# Patient Record
Sex: FEMALE | Race: WHITE | NOT HISPANIC OR LATINO | Employment: OTHER | URBAN - METROPOLITAN AREA
[De-identification: names, ages, dates, MRNs, and addresses within clinical notes are randomized per-mention and may not be internally consistent; named-entity substitution may affect disease eponyms.]

---

## 2017-01-12 ENCOUNTER — COMMUNICATION - HEALTHEAST (OUTPATIENT)
Dept: FAMILY MEDICINE | Facility: CLINIC | Age: 67
End: 2017-01-12

## 2017-03-16 ENCOUNTER — COMMUNICATION - HEALTHEAST (OUTPATIENT)
Dept: FAMILY MEDICINE | Facility: CLINIC | Age: 67
End: 2017-03-16

## 2017-03-16 DIAGNOSIS — L94.0 CIRCUMSCRIBED SCLERODERMA: ICD-10-CM

## 2017-03-17 RX ORDER — CLOBETASOL PROPIONATE 0.5 MG/G
CREAM TOPICAL
Qty: 15 G | Refills: 1 | Status: SHIPPED | OUTPATIENT
Start: 2017-03-17 | End: 2022-08-02

## 2017-03-20 ENCOUNTER — COMMUNICATION - HEALTHEAST (OUTPATIENT)
Dept: FAMILY MEDICINE | Facility: CLINIC | Age: 67
End: 2017-03-20

## 2017-04-11 ENCOUNTER — COMMUNICATION - HEALTHEAST (OUTPATIENT)
Dept: FAMILY MEDICINE | Facility: CLINIC | Age: 67
End: 2017-04-11

## 2017-04-18 ENCOUNTER — COMMUNICATION - HEALTHEAST (OUTPATIENT)
Dept: FAMILY MEDICINE | Facility: CLINIC | Age: 67
End: 2017-04-18

## 2017-05-02 ENCOUNTER — COMMUNICATION - HEALTHEAST (OUTPATIENT)
Dept: FAMILY MEDICINE | Facility: CLINIC | Age: 67
End: 2017-05-02

## 2017-05-02 DIAGNOSIS — B07.9 WART: ICD-10-CM

## 2017-05-11 ENCOUNTER — COMMUNICATION - HEALTHEAST (OUTPATIENT)
Dept: FAMILY MEDICINE | Facility: CLINIC | Age: 67
End: 2017-05-11

## 2017-05-16 ENCOUNTER — AMBULATORY - HEALTHEAST (OUTPATIENT)
Dept: LAB | Facility: CLINIC | Age: 67
End: 2017-05-16

## 2017-05-16 ENCOUNTER — COMMUNICATION - HEALTHEAST (OUTPATIENT)
Dept: SCHEDULING | Facility: CLINIC | Age: 67
End: 2017-05-16

## 2017-05-16 DIAGNOSIS — R30.0 DYSURIA: ICD-10-CM

## 2017-05-17 ENCOUNTER — RECORDS - HEALTHEAST (OUTPATIENT)
Dept: ADMINISTRATIVE | Facility: OTHER | Age: 67
End: 2017-05-17

## 2017-05-18 ENCOUNTER — OFFICE VISIT - HEALTHEAST (OUTPATIENT)
Dept: FAMILY MEDICINE | Facility: CLINIC | Age: 67
End: 2017-05-18

## 2017-05-18 DIAGNOSIS — R30.0 DYSURIA: ICD-10-CM

## 2017-05-25 ENCOUNTER — RECORDS - HEALTHEAST (OUTPATIENT)
Dept: ADMINISTRATIVE | Facility: OTHER | Age: 67
End: 2017-05-25

## 2017-06-30 ENCOUNTER — AMBULATORY - HEALTHEAST (OUTPATIENT)
Dept: LAB | Facility: CLINIC | Age: 67
End: 2017-06-30

## 2017-06-30 ENCOUNTER — AMBULATORY - HEALTHEAST (OUTPATIENT)
Dept: FAMILY MEDICINE | Facility: CLINIC | Age: 67
End: 2017-06-30

## 2017-06-30 ENCOUNTER — AMBULATORY - HEALTHEAST (OUTPATIENT)
Dept: NURSING | Facility: CLINIC | Age: 67
End: 2017-06-30

## 2017-06-30 DIAGNOSIS — Z86.39 H/O HYPERKALEMIA: ICD-10-CM

## 2017-07-10 ENCOUNTER — COMMUNICATION - HEALTHEAST (OUTPATIENT)
Dept: FAMILY MEDICINE | Facility: CLINIC | Age: 67
End: 2017-07-10

## 2017-09-13 ENCOUNTER — OFFICE VISIT - HEALTHEAST (OUTPATIENT)
Dept: FAMILY MEDICINE | Facility: CLINIC | Age: 67
End: 2017-09-13

## 2017-09-13 DIAGNOSIS — Z01.810 PREOP CARDIOVASCULAR EXAM: ICD-10-CM

## 2017-09-13 ASSESSMENT — MIFFLIN-ST. JEOR: SCORE: 1102.33

## 2017-09-14 ENCOUNTER — RECORDS - HEALTHEAST (OUTPATIENT)
Dept: ADMINISTRATIVE | Facility: OTHER | Age: 67
End: 2017-09-14

## 2017-09-15 ENCOUNTER — COMMUNICATION - HEALTHEAST (OUTPATIENT)
Dept: FAMILY MEDICINE | Facility: CLINIC | Age: 67
End: 2017-09-15

## 2017-10-20 ENCOUNTER — RECORDS - HEALTHEAST (OUTPATIENT)
Dept: ADMINISTRATIVE | Facility: OTHER | Age: 67
End: 2017-10-20

## 2017-10-26 ENCOUNTER — COMMUNICATION - HEALTHEAST (OUTPATIENT)
Dept: FAMILY MEDICINE | Facility: CLINIC | Age: 67
End: 2017-10-26

## 2017-11-27 ENCOUNTER — COMMUNICATION - HEALTHEAST (OUTPATIENT)
Dept: FAMILY MEDICINE | Facility: CLINIC | Age: 67
End: 2017-11-27

## 2017-12-27 ENCOUNTER — COMMUNICATION - HEALTHEAST (OUTPATIENT)
Dept: INTERNAL MEDICINE | Facility: CLINIC | Age: 67
End: 2017-12-27

## 2017-12-27 ENCOUNTER — OFFICE VISIT - HEALTHEAST (OUTPATIENT)
Dept: FAMILY MEDICINE | Facility: CLINIC | Age: 67
End: 2017-12-27

## 2017-12-27 ENCOUNTER — RECORDS - HEALTHEAST (OUTPATIENT)
Dept: ADMINISTRATIVE | Facility: OTHER | Age: 67
End: 2017-12-27

## 2017-12-27 DIAGNOSIS — L94.0 CIRCUMSCRIBED SCLERODERMA: ICD-10-CM

## 2017-12-27 DIAGNOSIS — Z00.00 ANNUAL PHYSICAL EXAM: ICD-10-CM

## 2017-12-27 DIAGNOSIS — N30.90 RECURRENT CYSTITIS: ICD-10-CM

## 2017-12-27 DIAGNOSIS — M81.0 OSTEOPOROSIS: ICD-10-CM

## 2017-12-27 LAB
CHOLEST SERPL-MCNC: 225 MG/DL
FASTING STATUS PATIENT QL REPORTED: YES
HDLC SERPL-MCNC: 90 MG/DL
LDLC SERPL CALC-MCNC: 126 MG/DL
TRIGL SERPL-MCNC: 46 MG/DL

## 2017-12-27 ASSESSMENT — MIFFLIN-ST. JEOR: SCORE: 1096.62

## 2017-12-28 ENCOUNTER — COMMUNICATION - HEALTHEAST (OUTPATIENT)
Dept: FAMILY MEDICINE | Facility: CLINIC | Age: 67
End: 2017-12-28

## 2018-01-02 ENCOUNTER — COMMUNICATION - HEALTHEAST (OUTPATIENT)
Dept: FAMILY MEDICINE | Facility: CLINIC | Age: 68
End: 2018-01-02

## 2018-07-23 ENCOUNTER — AMBULATORY - HEALTHEAST (OUTPATIENT)
Dept: INTERNAL MEDICINE | Facility: CLINIC | Age: 68
End: 2018-07-23

## 2018-07-23 DIAGNOSIS — M81.0 OSTEOPOROSIS: ICD-10-CM

## 2018-07-25 ENCOUNTER — AMBULATORY - HEALTHEAST (OUTPATIENT)
Dept: INTERNAL MEDICINE | Facility: CLINIC | Age: 68
End: 2018-07-25

## 2018-07-25 ENCOUNTER — COMMUNICATION - HEALTHEAST (OUTPATIENT)
Dept: PHARMACY | Facility: CLINIC | Age: 68
End: 2018-07-25

## 2018-07-25 ENCOUNTER — AMBULATORY - HEALTHEAST (OUTPATIENT)
Dept: SCHEDULING | Facility: CLINIC | Age: 68
End: 2018-07-25

## 2018-07-25 DIAGNOSIS — M81.0 AGE-RELATED OSTEOPOROSIS WITHOUT CURRENT PATHOLOGICAL FRACTURE: ICD-10-CM

## 2018-07-30 ENCOUNTER — AMBULATORY - HEALTHEAST (OUTPATIENT)
Dept: NURSING | Facility: CLINIC | Age: 68
End: 2018-07-30

## 2018-08-23 ENCOUNTER — COMMUNICATION - HEALTHEAST (OUTPATIENT)
Dept: FAMILY MEDICINE | Facility: CLINIC | Age: 68
End: 2018-08-23

## 2018-08-23 DIAGNOSIS — R82.994 HYPERCALCIURIA: ICD-10-CM

## 2018-08-27 ENCOUNTER — COMMUNICATION - HEALTHEAST (OUTPATIENT)
Dept: INTERNAL MEDICINE | Facility: CLINIC | Age: 68
End: 2018-08-27

## 2018-08-27 DIAGNOSIS — I10 HTN (HYPERTENSION): ICD-10-CM

## 2018-08-31 ENCOUNTER — AMBULATORY - HEALTHEAST (OUTPATIENT)
Dept: LAB | Facility: CLINIC | Age: 68
End: 2018-08-31

## 2018-08-31 ENCOUNTER — COMMUNICATION - HEALTHEAST (OUTPATIENT)
Dept: FAMILY MEDICINE | Facility: CLINIC | Age: 68
End: 2018-08-31

## 2018-08-31 DIAGNOSIS — R30.0 DYSURIA: ICD-10-CM

## 2018-08-31 LAB
ALBUMIN UR-MCNC: NEGATIVE MG/DL
APPEARANCE UR: CLEAR
BACTERIA #/AREA URNS HPF: ABNORMAL HPF
BILIRUB UR QL STRIP: NEGATIVE
COLOR UR AUTO: YELLOW
GLUCOSE UR STRIP-MCNC: NEGATIVE MG/DL
HGB UR QL STRIP: ABNORMAL
KETONES UR STRIP-MCNC: NEGATIVE MG/DL
LEUKOCYTE ESTERASE UR QL STRIP: ABNORMAL
NITRATE UR QL: POSITIVE
PH UR STRIP: 6 [PH] (ref 5–8)
RBC #/AREA URNS AUTO: ABNORMAL HPF
SP GR UR STRIP: 1.01 (ref 1–1.03)
SQUAMOUS #/AREA URNS AUTO: ABNORMAL LPF
UROBILINOGEN UR STRIP-ACNC: ABNORMAL
WBC #/AREA URNS AUTO: ABNORMAL HPF
WBC CLUMPS #/AREA URNS HPF: PRESENT /[HPF]

## 2018-09-02 ENCOUNTER — COMMUNICATION - HEALTHEAST (OUTPATIENT)
Dept: SCHEDULING | Facility: CLINIC | Age: 68
End: 2018-09-02

## 2018-09-02 LAB — BACTERIA SPEC CULT: ABNORMAL

## 2018-09-05 ENCOUNTER — RECORDS - HEALTHEAST (OUTPATIENT)
Dept: ADMINISTRATIVE | Facility: OTHER | Age: 68
End: 2018-09-05

## 2018-10-26 ENCOUNTER — COMMUNICATION - HEALTHEAST (OUTPATIENT)
Dept: SCHEDULING | Facility: CLINIC | Age: 68
End: 2018-10-26

## 2018-11-23 ENCOUNTER — COMMUNICATION - HEALTHEAST (OUTPATIENT)
Dept: INTERNAL MEDICINE | Facility: CLINIC | Age: 68
End: 2018-11-23

## 2018-11-23 DIAGNOSIS — I10 HTN (HYPERTENSION): ICD-10-CM

## 2018-12-07 ENCOUNTER — COMMUNICATION - HEALTHEAST (OUTPATIENT)
Dept: INTERNAL MEDICINE | Facility: CLINIC | Age: 68
End: 2018-12-07

## 2018-12-22 ENCOUNTER — OFFICE VISIT - HEALTHEAST (OUTPATIENT)
Dept: FAMILY MEDICINE | Facility: CLINIC | Age: 68
End: 2018-12-22

## 2018-12-22 ENCOUNTER — COMMUNICATION - HEALTHEAST (OUTPATIENT)
Dept: SCHEDULING | Facility: CLINIC | Age: 68
End: 2018-12-22

## 2018-12-22 DIAGNOSIS — Z78.9 FAILURE OF OUTPATIENT TREATMENT: ICD-10-CM

## 2018-12-22 DIAGNOSIS — N30.00 ACUTE CYSTITIS WITHOUT HEMATURIA: ICD-10-CM

## 2018-12-22 DIAGNOSIS — R30.0 DYSURIA: ICD-10-CM

## 2018-12-22 LAB
BACTERIA #/AREA URNS HPF: ABNORMAL HPF
RBC #/AREA URNS AUTO: ABNORMAL HPF
SQUAMOUS #/AREA URNS AUTO: ABNORMAL LPF
WBC #/AREA URNS AUTO: >100 HPF

## 2018-12-24 LAB — BACTERIA SPEC CULT: ABNORMAL

## 2019-01-28 ENCOUNTER — APPOINTMENT (RX ONLY)
Dept: URBAN - METROPOLITAN AREA CLINIC 124 | Facility: CLINIC | Age: 69
Setting detail: DERMATOLOGY
End: 2019-01-28

## 2019-01-28 DIAGNOSIS — L81.4 OTHER MELANIN HYPERPIGMENTATION: ICD-10-CM

## 2019-01-28 DIAGNOSIS — B07.8 OTHER VIRAL WARTS: ICD-10-CM

## 2019-01-28 PROCEDURE — ? BENIGN DESTRUCTION

## 2019-01-28 PROCEDURE — ? TREATMENT REGIMEN

## 2019-01-28 PROCEDURE — ? COUNSELING

## 2019-01-28 PROCEDURE — 17110 DESTRUCTION B9 LES UP TO 14: CPT

## 2019-01-28 PROCEDURE — 99202 OFFICE O/P NEW SF 15 MIN: CPT | Mod: 25

## 2019-01-28 ASSESSMENT — LOCATION SIMPLE DESCRIPTION DERM
LOCATION SIMPLE: LEFT MIDDLE FINGER
LOCATION SIMPLE: LEFT SMALL FINGER
LOCATION SIMPLE: LEFT CHEEK

## 2019-01-28 ASSESSMENT — LOCATION DETAILED DESCRIPTION DERM
LOCATION DETAILED: LEFT INFERIOR CENTRAL MALAR CHEEK
LOCATION DETAILED: LEFT SMALL FINGER PROXIMAL INTERPHALANGEAL JOINT
LOCATION DETAILED: LEFT MID DORSAL MIDDLE FINGER
LOCATION DETAILED: LEFT MIDDLE FINGER PROXIMAL INTERPHALANGEAL JOINT

## 2019-01-28 ASSESSMENT — LOCATION ZONE DERM
LOCATION ZONE: FINGER
LOCATION ZONE: FACE

## 2019-01-28 NOTE — PROCEDURE: BENIGN DESTRUCTION
Include Z78.9 (Other Specified Conditions Influencing Health Status) As An Associated Diagnosis?: No
Treatment Number (Will Not Render If 0): 0
Detail Level: Simple
Medical Necessity Information: It is in your best interest to select a reason for this procedure from the list below. All of these items fulfill various CMS LCD requirements except the new and changing color options.
Anesthesia Volume In Cc: 0.5
Medical Necessity Clause: This procedure was medically necessary because the lesions that were treated were:
Consent: The patient's consent was obtained including but not limited to risks of crusting, scabbing, blistering, scarring, darker or lighter pigmentary change, recurrence, incomplete removal and infection.
Post-Care Instructions: I reviewed with the patient in detail post-care instructions. Patient is to wear sunprotection, and avoid picking at any of the treated lesions. Pt may apply Vaseline to crusted or scabbing areas. Wash areas treated with Canthacur in 4-6 hours with soap and water.

## 2019-01-28 NOTE — PROCEDURE: TREATMENT REGIMEN
Plan: If there is no resolution with todayâs treatment we will keep cantharidin injections and topical treatments in reserve. All questions answered.  Discussed with patient risks benefits adverse effects
Detail Level: Zone

## 2019-02-11 ENCOUNTER — APPOINTMENT (RX ONLY)
Dept: URBAN - METROPOLITAN AREA CLINIC 124 | Facility: CLINIC | Age: 69
Setting detail: DERMATOLOGY
End: 2019-02-11

## 2019-02-11 DIAGNOSIS — B07.8 OTHER VIRAL WARTS: ICD-10-CM

## 2019-02-11 PROCEDURE — ? CANTHARIDIN

## 2019-02-11 PROCEDURE — ? LIQUID NITROGEN

## 2019-02-11 PROCEDURE — 17110 DESTRUCTION B9 LES UP TO 14: CPT

## 2019-02-11 ASSESSMENT — LOCATION SIMPLE DESCRIPTION DERM: LOCATION SIMPLE: LEFT MIDDLE FINGER

## 2019-02-11 ASSESSMENT — LOCATION DETAILED DESCRIPTION DERM
LOCATION DETAILED: LEFT MID DORSAL MIDDLE FINGER
LOCATION DETAILED: LEFT MIDDLE DISTAL INTERPHALANGEAL JOINT
LOCATION DETAILED: LEFT MIDDLE PROXIMAL INTERPHALANGEAL JOINT
LOCATION DETAILED: LEFT PROXIMAL DORSAL MIDDLE FINGER

## 2019-02-11 ASSESSMENT — LOCATION ZONE DERM: LOCATION ZONE: FINGER

## 2019-02-11 NOTE — PROCEDURE: LIQUID NITROGEN
Medical Necessity Information: It is in your best interest to select a reason for this procedure from the list below. All of these items fulfill various CMS LCD requirements except the new and changing color options.
Number Of Freeze-Thaw Cycles: 2 freeze-thaw cycles
Add 52 Modifier (Optional): no
Detail Level: Detailed
Consent: The patient's consent was obtained including but not limited to risks of crusting, scabbing, blistering, scarring, darker or lighter pigmentary change, recurrence, incomplete removal and infection.
Medical Necessity Clause: This procedure was medically necessary because the lesions that were treated were:
Post-Care Instructions: I reviewed with the patient in detail post-care instructions. Patient is to wear sunprotection, and avoid picking at any of the treated lesions. Pt may apply Vaseline to crusted or scabbing areas.

## 2019-02-11 NOTE — PROCEDURE: CANTHARIDIN
Detail Level: Detailed
Medical Necessity Information: It is in your best interest to select a reason for this procedure from the list below. All of these items fulfill various CMS LCD requirements except the new and changing color options.
Post-Care Instructions: I reviewed with the patient in detail post-care instructions. The patient understands that the treated areas should be washed off 4 to 6 hours after application.
Medical Necessity Clause: This procedure was medically necessary because the lesions that were treated were:
Curette Text: Prior to application of cantharidin the lesions were lightly pared with a curette.
Include Z78.9 (Other Specified Conditions Influencing Health Status) As An Associated Diagnosis?: No
Strength: Joselo
Consent: The patient's consent was obtained including but not limited to risks of crusting, scabbing, scarring, blistering, darker or lighter pigmentary change, recurrence, incomplete removal and infection.

## 2019-02-20 ENCOUNTER — APPOINTMENT (RX ONLY)
Dept: URBAN - METROPOLITAN AREA CLINIC 124 | Facility: CLINIC | Age: 69
Setting detail: DERMATOLOGY
End: 2019-02-20

## 2019-02-20 DIAGNOSIS — S31000A OPEN WOUND(S) (MULTIPLE) OF UNSPECIFIED SITE(S), WITHOUT MENTION OF COMPLICATION: ICD-10-CM

## 2019-02-20 PROBLEM — S61.203A UNSPECIFIED OPEN WOUND OF LEFT MIDDLE FINGER WITHOUT DAMAGE TO NAIL, INITIAL ENCOUNTER: Status: ACTIVE | Noted: 2019-02-20

## 2019-02-20 PROCEDURE — 99024 POSTOP FOLLOW-UP VISIT: CPT

## 2019-02-20 PROCEDURE — ? POST-OP WOUND CHECK

## 2019-02-20 ASSESSMENT — LOCATION ZONE DERM: LOCATION ZONE: FINGER

## 2019-02-20 ASSESSMENT — LOCATION DETAILED DESCRIPTION DERM
LOCATION DETAILED: LEFT MIDDLE FINGER PROXIMAL INTERPHALANGEAL JOINT
LOCATION DETAILED: LEFT MIDDLE FINGER DISTAL INTERPHALANGEAL JOINT

## 2019-02-20 ASSESSMENT — LOCATION SIMPLE DESCRIPTION DERM: LOCATION SIMPLE: LEFT MIDDLE FINGER

## 2019-02-20 NOTE — PROCEDURE: POST-OP WOUND CHECK
Detail Level: Detailed
Additional Comments: Patient presents for visit with complaint of non healing wound. Reports that she is unable to bend her finger, believes there is possible ânerve damageâ, very painful, in [place].
Add 65063 Cpt? (Important Note: In 2017 The Use Of 08820 Is Being Tracked By Cms To Determine Future Global Period Reimbursement For Global Periods): yes

## 2019-02-22 ENCOUNTER — APPOINTMENT (RX ONLY)
Dept: URBAN - METROPOLITAN AREA CLINIC 124 | Facility: CLINIC | Age: 69
Setting detail: DERMATOLOGY
End: 2019-02-22

## 2019-02-22 DIAGNOSIS — S31000A OPEN WOUND(S) (MULTIPLE) OF UNSPECIFIED SITE(S), WITHOUT MENTION OF COMPLICATION: ICD-10-CM | Status: IMPROVED

## 2019-02-22 PROBLEM — S61.203A UNSPECIFIED OPEN WOUND OF LEFT MIDDLE FINGER WITHOUT DAMAGE TO NAIL, INITIAL ENCOUNTER: Status: ACTIVE | Noted: 2019-02-22

## 2019-02-22 PROCEDURE — ? TREATMENT REGIMEN

## 2019-02-22 PROCEDURE — ? COUNSELING

## 2019-02-22 PROCEDURE — 99024 POSTOP FOLLOW-UP VISIT: CPT

## 2019-02-22 ASSESSMENT — LOCATION ZONE DERM: LOCATION ZONE: FINGER

## 2019-02-22 ASSESSMENT — LOCATION DETAILED DESCRIPTION DERM: LOCATION DETAILED: LEFT MID DORSAL MIDDLE FINGER

## 2019-02-22 ASSESSMENT — LOCATION SIMPLE DESCRIPTION DERM: LOCATION SIMPLE: LEFT MIDDLE FINGER

## 2019-02-22 NOTE — PROCEDURE: TREATMENT REGIMEN
Detail Level: Zone
Discontinue Regimen: -white vinegar soaks
Continue Regimen: -Vaseline to left 3rd finger

## 2019-03-22 ENCOUNTER — APPOINTMENT (RX ONLY)
Dept: URBAN - METROPOLITAN AREA CLINIC 124 | Facility: CLINIC | Age: 69
Setting detail: DERMATOLOGY
End: 2019-03-22

## 2019-03-22 DIAGNOSIS — L81.4 OTHER MELANIN HYPERPIGMENTATION: ICD-10-CM

## 2019-03-22 DIAGNOSIS — D22 MELANOCYTIC NEVI: ICD-10-CM

## 2019-03-22 DIAGNOSIS — D18.0 HEMANGIOMA: ICD-10-CM

## 2019-03-22 DIAGNOSIS — Z71.89 OTHER SPECIFIED COUNSELING: ICD-10-CM

## 2019-03-22 DIAGNOSIS — L82.1 OTHER SEBORRHEIC KERATOSIS: ICD-10-CM

## 2019-03-22 DIAGNOSIS — D485 NEOPLASM OF UNCERTAIN BEHAVIOR OF SKIN: ICD-10-CM

## 2019-03-22 PROBLEM — D48.5 NEOPLASM OF UNCERTAIN BEHAVIOR OF SKIN: Status: ACTIVE | Noted: 2019-03-22

## 2019-03-22 PROBLEM — D18.01 HEMANGIOMA OF SKIN AND SUBCUTANEOUS TISSUE: Status: ACTIVE | Noted: 2019-03-22

## 2019-03-22 PROBLEM — D22.5 MELANOCYTIC NEVI OF TRUNK: Status: ACTIVE | Noted: 2019-03-22

## 2019-03-22 PROCEDURE — ? DEFER

## 2019-03-22 PROCEDURE — ? DIAGNOSIS COMMENT

## 2019-03-22 PROCEDURE — 99214 OFFICE O/P EST MOD 30 MIN: CPT

## 2019-03-22 PROCEDURE — ? COUNSELING

## 2019-03-22 ASSESSMENT — LOCATION DETAILED DESCRIPTION DERM
LOCATION DETAILED: EPIGASTRIC SKIN
LOCATION DETAILED: LEFT RADIAL DORSAL HAND
LOCATION DETAILED: MIDDLE STERNUM
LOCATION DETAILED: INFERIOR THORACIC SPINE
LOCATION DETAILED: LEFT INFERIOR UPPER BACK
LOCATION DETAILED: RIGHT ANTERIOR SHOULDER

## 2019-03-22 ASSESSMENT — LOCATION SIMPLE DESCRIPTION DERM
LOCATION SIMPLE: LEFT HAND
LOCATION SIMPLE: UPPER BACK
LOCATION SIMPLE: ABDOMEN
LOCATION SIMPLE: CHEST
LOCATION SIMPLE: LEFT UPPER BACK
LOCATION SIMPLE: RIGHT SHOULDER

## 2019-03-22 ASSESSMENT — LOCATION ZONE DERM
LOCATION ZONE: ARM
LOCATION ZONE: HAND
LOCATION ZONE: TRUNK

## 2019-03-22 NOTE — PROCEDURE: DIAGNOSIS COMMENT
Detail Level: Zone
Comment: 2* to LN2 an canthacur treatments to treat warts\\nno evidence of verruca present today

## 2019-05-06 ENCOUNTER — OFFICE VISIT - HEALTHEAST (OUTPATIENT)
Dept: FAMILY MEDICINE | Facility: CLINIC | Age: 69
End: 2019-05-06

## 2019-05-06 ENCOUNTER — COMMUNICATION - HEALTHEAST (OUTPATIENT)
Dept: SCHEDULING | Facility: CLINIC | Age: 69
End: 2019-05-06

## 2019-05-06 DIAGNOSIS — R30.0 DYSURIA: ICD-10-CM

## 2019-05-06 LAB
ALBUMIN UR-MCNC: NEGATIVE MG/DL
APPEARANCE UR: CLEAR
BACTERIA #/AREA URNS HPF: ABNORMAL HPF
BILIRUB UR QL STRIP: NEGATIVE
COLOR UR AUTO: YELLOW
GLUCOSE UR STRIP-MCNC: NEGATIVE MG/DL
HGB UR QL STRIP: ABNORMAL
KETONES UR STRIP-MCNC: NEGATIVE MG/DL
LEUKOCYTE ESTERASE UR QL STRIP: ABNORMAL
NITRATE UR QL: POSITIVE
PH UR STRIP: 7 [PH] (ref 5–8)
RBC #/AREA URNS AUTO: ABNORMAL HPF
SP GR UR STRIP: 1.01 (ref 1–1.03)
SQUAMOUS #/AREA URNS AUTO: ABNORMAL LPF
UROBILINOGEN UR STRIP-ACNC: ABNORMAL
WBC #/AREA URNS AUTO: >100 HPF

## 2019-05-08 LAB — BACTERIA SPEC CULT: ABNORMAL

## 2019-05-09 ENCOUNTER — COMMUNICATION - HEALTHEAST (OUTPATIENT)
Dept: FAMILY MEDICINE | Facility: CLINIC | Age: 69
End: 2019-05-09

## 2019-07-08 ENCOUNTER — COMMUNICATION - HEALTHEAST (OUTPATIENT)
Dept: SCHEDULING | Facility: CLINIC | Age: 69
End: 2019-07-08

## 2019-07-08 ENCOUNTER — AMBULATORY - HEALTHEAST (OUTPATIENT)
Dept: LAB | Facility: CLINIC | Age: 69
End: 2019-07-08

## 2019-07-08 ENCOUNTER — COMMUNICATION - HEALTHEAST (OUTPATIENT)
Dept: FAMILY MEDICINE | Facility: CLINIC | Age: 69
End: 2019-07-08

## 2019-07-08 DIAGNOSIS — N30.01 ACUTE CYSTITIS WITH HEMATURIA: ICD-10-CM

## 2019-07-08 DIAGNOSIS — R30.0 DYSURIA: ICD-10-CM

## 2019-07-08 LAB
ALBUMIN UR-MCNC: NEGATIVE MG/DL
APPEARANCE UR: CLEAR
BACTERIA #/AREA URNS HPF: ABNORMAL HPF
BILIRUB UR QL STRIP: NEGATIVE
COLOR UR AUTO: YELLOW
GLUCOSE UR STRIP-MCNC: NEGATIVE MG/DL
HGB UR QL STRIP: ABNORMAL
KETONES UR STRIP-MCNC: NEGATIVE MG/DL
LEUKOCYTE ESTERASE UR QL STRIP: ABNORMAL
NITRATE UR QL: NEGATIVE
PH UR STRIP: 6.5 [PH] (ref 5–8)
RBC #/AREA URNS AUTO: ABNORMAL HPF
SP GR UR STRIP: 1.01 (ref 1–1.03)
SQUAMOUS #/AREA URNS AUTO: ABNORMAL LPF
UROBILINOGEN UR STRIP-ACNC: ABNORMAL
WBC #/AREA URNS AUTO: ABNORMAL HPF

## 2019-07-09 ENCOUNTER — OFFICE VISIT - HEALTHEAST (OUTPATIENT)
Dept: FAMILY MEDICINE | Facility: CLINIC | Age: 69
End: 2019-07-09

## 2019-07-09 DIAGNOSIS — Z12.11 SCREEN FOR COLON CANCER: ICD-10-CM

## 2019-07-09 DIAGNOSIS — N30.01 ACUTE CYSTITIS WITH HEMATURIA: ICD-10-CM

## 2019-07-09 DIAGNOSIS — R30.0 DYSURIA: ICD-10-CM

## 2019-07-09 DIAGNOSIS — Z12.31 VISIT FOR SCREENING MAMMOGRAM: ICD-10-CM

## 2019-07-09 ASSESSMENT — MIFFLIN-ST. JEOR: SCORE: 1098.36

## 2019-07-10 ENCOUNTER — COMMUNICATION - HEALTHEAST (OUTPATIENT)
Dept: LAB | Facility: CLINIC | Age: 69
End: 2019-07-10

## 2019-07-10 LAB — BACTERIA SPEC CULT: ABNORMAL

## 2019-08-08 ENCOUNTER — AMBULATORY - HEALTHEAST (OUTPATIENT)
Dept: INTERNAL MEDICINE | Facility: CLINIC | Age: 69
End: 2019-08-08

## 2019-08-08 DIAGNOSIS — M81.0 OSTEOPOROSIS: ICD-10-CM

## 2019-08-12 ENCOUNTER — AMBULATORY - HEALTHEAST (OUTPATIENT)
Dept: NURSING | Facility: CLINIC | Age: 69
End: 2019-08-12

## 2019-08-12 ENCOUNTER — COMMUNICATION - HEALTHEAST (OUTPATIENT)
Dept: INTERNAL MEDICINE | Facility: CLINIC | Age: 69
End: 2019-08-12

## 2019-09-17 ENCOUNTER — HOSPITAL ENCOUNTER (OUTPATIENT)
Dept: MAMMOGRAPHY | Facility: CLINIC | Age: 69
Discharge: HOME | End: 2019-09-17

## 2019-09-17 DIAGNOSIS — Z12.31 VISIT FOR SCREENING MAMMOGRAM: ICD-10-CM

## 2019-09-20 ENCOUNTER — HOSPITAL ENCOUNTER (OUTPATIENT)
Dept: MAMMOGRAPHY | Facility: CLINIC | Age: 69
Discharge: HOME | End: 2019-09-20

## 2019-09-20 DIAGNOSIS — R92.0 MICROCALCIFICATIONS OF THE BREAST: ICD-10-CM

## 2020-01-10 ENCOUNTER — COMMUNICATION - HEALTHEAST (OUTPATIENT)
Dept: FAMILY MEDICINE | Facility: CLINIC | Age: 70
End: 2020-01-10

## 2020-01-10 DIAGNOSIS — M89.9 DISORDER OF BONE AND CARTILAGE: ICD-10-CM

## 2020-01-10 DIAGNOSIS — M94.9 DISORDER OF BONE AND CARTILAGE: ICD-10-CM

## 2020-07-06 ENCOUNTER — RECORDS - HEALTHEAST (OUTPATIENT)
Dept: ADMINISTRATIVE | Facility: OTHER | Age: 70
End: 2020-07-06

## 2020-07-15 ENCOUNTER — COMMUNICATION - HEALTHEAST (OUTPATIENT)
Dept: FAMILY MEDICINE | Facility: CLINIC | Age: 70
End: 2020-07-15

## 2020-07-29 ENCOUNTER — OFFICE VISIT - HEALTHEAST (OUTPATIENT)
Dept: FAMILY MEDICINE | Facility: CLINIC | Age: 70
End: 2020-07-29

## 2020-07-29 DIAGNOSIS — E87.5 HYPERKALEMIA: ICD-10-CM

## 2020-07-29 DIAGNOSIS — H43.319: ICD-10-CM

## 2020-07-29 DIAGNOSIS — Z01.818 PRE-OP EXAM: ICD-10-CM

## 2020-07-29 LAB
ANION GAP SERPL CALCULATED.3IONS-SCNC: 8 MMOL/L (ref 5–18)
BUN SERPL-MCNC: 15 MG/DL (ref 8–22)
CALCIUM SERPL-MCNC: 9.8 MG/DL (ref 8.5–10.5)
CHLORIDE BLD-SCNC: 102 MMOL/L (ref 98–107)
CO2 SERPL-SCNC: 29 MMOL/L (ref 22–31)
CREAT SERPL-MCNC: 0.71 MG/DL (ref 0.6–1.1)
GFR SERPL CREATININE-BSD FRML MDRD: >60 ML/MIN/1.73M2
GLUCOSE BLD-MCNC: 85 MG/DL (ref 70–125)
HGB BLD-MCNC: 13.5 G/DL (ref 12–16)
POTASSIUM BLD-SCNC: 4.2 MMOL/L (ref 3.5–5)
SODIUM SERPL-SCNC: 139 MMOL/L (ref 136–145)

## 2020-07-29 ASSESSMENT — MIFFLIN-ST. JEOR: SCORE: 1106.69

## 2020-08-04 ENCOUNTER — RECORDS - HEALTHEAST (OUTPATIENT)
Dept: ADMINISTRATIVE | Facility: OTHER | Age: 70
End: 2020-08-04

## 2020-08-13 ENCOUNTER — COMMUNICATION - HEALTHEAST (OUTPATIENT)
Dept: INTERNAL MEDICINE | Facility: CLINIC | Age: 70
End: 2020-08-13

## 2020-08-13 DIAGNOSIS — M81.0 OSTEOPOROSIS: ICD-10-CM

## 2020-08-13 DIAGNOSIS — Z92.29 PERSONAL HISTORY OF DRUG THERAPY: ICD-10-CM

## 2020-08-21 ENCOUNTER — COMMUNICATION - HEALTHEAST (OUTPATIENT)
Dept: INTERNAL MEDICINE | Facility: CLINIC | Age: 70
End: 2020-08-21

## 2020-08-24 ENCOUNTER — OFFICE VISIT - HEALTHEAST (OUTPATIENT)
Dept: INTERNAL MEDICINE | Facility: CLINIC | Age: 70
End: 2020-08-24

## 2020-08-24 DIAGNOSIS — I10 HTN (HYPERTENSION): ICD-10-CM

## 2020-08-24 DIAGNOSIS — M81.0 OSTEOPOROSIS: ICD-10-CM

## 2020-08-24 DIAGNOSIS — R82.994 HYPERCALCIURIA: ICD-10-CM

## 2020-08-24 LAB
ALBUMIN UR-MCNC: NEGATIVE MG/DL
APPEARANCE UR: CLEAR
BILIRUB UR QL STRIP: NEGATIVE
COLOR UR AUTO: YELLOW
GLUCOSE UR STRIP-MCNC: NEGATIVE MG/DL
HGB UR QL STRIP: NEGATIVE
KETONES UR STRIP-MCNC: NEGATIVE MG/DL
LEUKOCYTE ESTERASE UR QL STRIP: NEGATIVE
NITRATE UR QL: NEGATIVE
PH UR STRIP: 7 [PH] (ref 5–8)
SP GR UR STRIP: 1.02 (ref 1–1.03)
UROBILINOGEN UR STRIP-ACNC: NORMAL

## 2020-08-24 RX ORDER — CHLORTHALIDONE 25 MG/1
TABLET ORAL
Qty: 90 TABLET | Refills: 13 | Status: SHIPPED
Start: 2020-08-24 | End: 2021-08-05

## 2020-08-25 ENCOUNTER — COMMUNICATION - HEALTHEAST (OUTPATIENT)
Dept: INTERNAL MEDICINE | Facility: CLINIC | Age: 70
End: 2020-08-25

## 2020-08-25 LAB — 25(OH)D3 SERPL-MCNC: 30 NG/ML (ref 30–80)

## 2020-09-03 ENCOUNTER — AMBULATORY - HEALTHEAST (OUTPATIENT)
Dept: SCHEDULING | Facility: CLINIC | Age: 70
End: 2020-09-03

## 2020-09-03 DIAGNOSIS — M81.0 OSTEOPOROSIS: ICD-10-CM

## 2020-09-10 ENCOUNTER — AMBULATORY - HEALTHEAST (OUTPATIENT)
Dept: LAB | Facility: CLINIC | Age: 70
End: 2020-09-10

## 2020-09-10 ENCOUNTER — COMMUNICATION - HEALTHEAST (OUTPATIENT)
Dept: INTERNAL MEDICINE | Facility: CLINIC | Age: 70
End: 2020-09-10

## 2020-09-10 DIAGNOSIS — R82.994 HYPERCALCIURIA: ICD-10-CM

## 2020-09-10 LAB
CALCIUM 24H UR-MRATE: 269 MG/24HR (ref 20–275)
CALCIUM UR-MCNC: 15.8 MG/DL
SPECIMEN VOL UR: 1700 ML

## 2020-11-26 ENCOUNTER — COMMUNICATION - HEALTHEAST (OUTPATIENT)
Dept: INTERNAL MEDICINE | Facility: CLINIC | Age: 70
End: 2020-11-26

## 2021-01-18 ENCOUNTER — COMMUNICATION - HEALTHEAST (OUTPATIENT)
Dept: INTERNAL MEDICINE | Facility: CLINIC | Age: 71
End: 2021-01-18

## 2021-03-01 ENCOUNTER — COMMUNICATION - HEALTHEAST (OUTPATIENT)
Dept: ADMINISTRATIVE | Facility: CLINIC | Age: 71
End: 2021-03-01

## 2021-03-22 ENCOUNTER — APPOINTMENT (RX ONLY)
Dept: URBAN - METROPOLITAN AREA CLINIC 124 | Facility: CLINIC | Age: 71
Setting detail: DERMATOLOGY
End: 2021-03-22

## 2021-03-22 DIAGNOSIS — L82.1 OTHER SEBORRHEIC KERATOSIS: ICD-10-CM

## 2021-03-22 DIAGNOSIS — L82.0 INFLAMED SEBORRHEIC KERATOSIS: ICD-10-CM

## 2021-03-22 DIAGNOSIS — B07.8 OTHER VIRAL WARTS: ICD-10-CM | Status: INADEQUATELY CONTROLLED

## 2021-03-22 DIAGNOSIS — L81.4 OTHER MELANIN HYPERPIGMENTATION: ICD-10-CM

## 2021-03-22 PROCEDURE — 17110 DESTRUCTION B9 LES UP TO 14: CPT

## 2021-03-22 PROCEDURE — ? COUNSELING

## 2021-03-22 PROCEDURE — ? LIQUID NITROGEN

## 2021-03-22 PROCEDURE — ? BENIGN DESTRUCTION

## 2021-03-22 PROCEDURE — 99213 OFFICE O/P EST LOW 20 MIN: CPT | Mod: 25

## 2021-03-22 ASSESSMENT — LOCATION ZONE DERM
LOCATION ZONE: FACE
LOCATION ZONE: FINGER
LOCATION ZONE: ARM
LOCATION ZONE: LEG

## 2021-03-22 ASSESSMENT — LOCATION DETAILED DESCRIPTION DERM
LOCATION DETAILED: LEFT DISTAL DORSAL FOREARM
LOCATION DETAILED: RIGHT INFERIOR MEDIAL FOREHEAD
LOCATION DETAILED: RIGHT PROXIMAL RADIAL DORSAL FOREARM
LOCATION DETAILED: LEFT RING FINGER PROXIMAL INTERPHALANGEAL JOINT
LOCATION DETAILED: RIGHT ANTERIOR PROXIMAL THIGH
LOCATION DETAILED: LEFT MID DORSAL RING FINGER
LOCATION DETAILED: LEFT PROXIMAL DORSAL FOREARM
LOCATION DETAILED: LEFT ANTERIOR DISTAL THIGH

## 2021-03-22 ASSESSMENT — LOCATION SIMPLE DESCRIPTION DERM
LOCATION SIMPLE: RIGHT FOREARM
LOCATION SIMPLE: RIGHT THIGH
LOCATION SIMPLE: LEFT FOREARM
LOCATION SIMPLE: LEFT RING FINGER
LOCATION SIMPLE: LEFT THIGH
LOCATION SIMPLE: RIGHT FOREHEAD

## 2021-03-22 NOTE — PROCEDURE: BENIGN DESTRUCTION
Anesthesia Volume In Cc: 0.5
Post-Care Instructions: I reviewed with the patient in detail post-care instructions. Patient is to wear sunprotection, and avoid picking at any of the treated lesions. Pt may apply Vaseline to crusted or scabbing areas. Wash areas treated with Canthacur in 4-6 hours with soap and water.
Detail Level: Simple
Include Z78.9 (Other Specified Conditions Influencing Health Status) As An Associated Diagnosis?: No
Consent: The patient's consent was obtained including but not limited to risks of crusting, scabbing, blistering, scarring, darker or lighter pigmentary change, recurrence, incomplete removal and infection.
Treatment Number (Will Not Render If 0): 0
Render Post-Care Instructions In Note?: yes
Medical Necessity Clause: This procedure was medically necessary because the lesions that were treated were:
Medical Necessity Information: It is in your best interest to select a reason for this procedure from the list below. All of these items fulfill various CMS LCD requirements except the new and changing color options.

## 2021-03-22 NOTE — PROCEDURE: LIQUID NITROGEN
Post-Care Instructions: I reviewed with the patient in detail post-care instructions. Patient is to wear sunprotection, and avoid picking at any of the treated lesions. Pt may apply Vaseline to crusted or scabbing areas.
Medical Necessity Information: It is in your best interest to select a reason for this procedure from the list below. All of these items fulfill various CMS LCD requirements except the new and changing color options.
Detail Level: Simple
Include Z78.9 (Other Specified Conditions Influencing Health Status) As An Associated Diagnosis?: No
Consent: The patient's consent was obtained including but not limited to risks of crusting, scabbing, blistering, scarring, darker or lighter pigmentary change, recurrence, incomplete removal and infection.
Medical Necessity Clause: This procedure was medically necessary because the lesions that were treated were:
Number Of Freeze-Thaw Cycles: 2 freeze-thaw cycles

## 2021-05-06 ENCOUNTER — RECORDS - HEALTHEAST (OUTPATIENT)
Dept: SCHEDULING | Facility: CLINIC | Age: 71
End: 2021-05-06

## 2021-05-19 ENCOUNTER — OFFICE VISIT - HEALTHEAST (OUTPATIENT)
Dept: FAMILY MEDICINE | Facility: CLINIC | Age: 71
End: 2021-05-19

## 2021-05-19 DIAGNOSIS — M81.0 OSTEOPOROSIS, UNSPECIFIED OSTEOPOROSIS TYPE, UNSPECIFIED PATHOLOGICAL FRACTURE PRESENCE: ICD-10-CM

## 2021-05-19 DIAGNOSIS — E83.50 DISORDER OF CALCIUM METABOLISM: ICD-10-CM

## 2021-05-19 DIAGNOSIS — M89.9 DISORDER OF BONE AND CARTILAGE: ICD-10-CM

## 2021-05-19 DIAGNOSIS — M94.9 DISORDER OF BONE AND CARTILAGE: ICD-10-CM

## 2021-05-19 DIAGNOSIS — H25.9 AGE-RELATED CATARACT OF BOTH EYES, UNSPECIFIED AGE-RELATED CATARACT TYPE: ICD-10-CM

## 2021-05-19 DIAGNOSIS — L94.0 CIRCUMSCRIBED SCLERODERMA: ICD-10-CM

## 2021-05-19 DIAGNOSIS — Z01.818 PREOP GENERAL PHYSICAL EXAM: ICD-10-CM

## 2021-05-19 RX ORDER — ESTRADIOL 0.1 MG/G
CREAM VAGINAL
Qty: 42.5 G | Refills: 2 | Status: SHIPPED | OUTPATIENT
Start: 2021-05-19 | End: 2022-08-05

## 2021-05-19 ASSESSMENT — MIFFLIN-ST. JEOR: SCORE: 1119.39

## 2021-05-24 ENCOUNTER — RECORDS - HEALTHEAST (OUTPATIENT)
Dept: ADMINISTRATIVE | Facility: CLINIC | Age: 71
End: 2021-05-24

## 2021-05-25 ENCOUNTER — RECORDS - HEALTHEAST (OUTPATIENT)
Dept: ADMINISTRATIVE | Facility: CLINIC | Age: 71
End: 2021-05-25

## 2021-05-26 ENCOUNTER — RECORDS - HEALTHEAST (OUTPATIENT)
Dept: ADMINISTRATIVE | Facility: CLINIC | Age: 71
End: 2021-05-26

## 2021-05-27 ENCOUNTER — RECORDS - HEALTHEAST (OUTPATIENT)
Dept: ADMINISTRATIVE | Facility: CLINIC | Age: 71
End: 2021-05-27

## 2021-05-27 VITALS
SYSTOLIC BLOOD PRESSURE: 117 MMHG | WEIGHT: 114.8 LBS | HEART RATE: 73 BPM | HEIGHT: 70 IN | RESPIRATION RATE: 16 BRPM | BODY MASS INDEX: 16.43 KG/M2 | DIASTOLIC BLOOD PRESSURE: 73 MMHG

## 2021-05-28 ENCOUNTER — RECORDS - HEALTHEAST (OUTPATIENT)
Dept: ADMINISTRATIVE | Facility: CLINIC | Age: 71
End: 2021-05-28

## 2021-05-28 NOTE — TELEPHONE ENCOUNTER
----- Message from Domi Bui PA-C sent at 5/8/2019 11:50 AM CDT -----  Culture shows still E. Coli and still sensitive to Nitrofurantoin, please call results to the patient or send a letter if not reachable by phone.

## 2021-05-28 NOTE — TELEPHONE ENCOUNTER
RN Triage:    Spoke with 69 yr old Mirta who reports the following symptoms/information:    Hx of UTI's.  Last seen 12/22/18 at [facility].    Burning with urination, urgency and lower abdominal discomfort worsening over the past 3 days.    Takes AZO.    Concentrated urine/cloudy urine.  Pt has specimen in sterile container; clinic provided container.    Denies fever or flank pain.    Usually prescribed Nitrofurantoin.  Pt's [place] provider and Dr. Persaud  recommended staying on Nitrofurantoin in the past.    Would usually give Rx's without OV.    Requests Rx.      PLAN:  Advised OV today per protocol.  Pt requests lab and Rx if possible.  Will consult with covering MD per patient request for possible Rx for UTI symptoms.  Please advise.  Advised patient to increase fluids.  Advised patient to call back if symptoms worsening.    Anca Rosa RN   [facility] RN Triage      Reason for Disposition    > 2 UTIs in last year     4-5.    Protocols used: URINATION PAIN - FEMALE-A-OH

## 2021-05-28 NOTE — TELEPHONE ENCOUNTER
Who is calling:  Patient   Reason for Call:  Patient returned call from clinic staff.  Writer was able to schedule patient with provider at [facility] primary for today.   Date of last appointment with primary care: 12/27/17  Okay to leave a detailed message: Yes

## 2021-05-28 NOTE — TELEPHONE ENCOUNTER
Spoke with Dr Masters and she would like pt to be seen since last urine culture showed resistance to some antibiotics. Would like her to be seen and leave another sample to send for culture to make sure correct antibiotics will be sent. No openings here today but could be seen at another clinic or [facility]. Otherwise we could schedule pt here tomorrow. Pt will also need to est care with a new provider now that Dr Persaud is gone.

## 2021-05-28 NOTE — PROGRESS NOTES
Culture shows still E. Coli and still sensitive to Nitrofurantoin, please call results to the patient or send a letter if not reachable by phone.

## 2021-05-28 NOTE — PROGRESS NOTES
CHIEF COMPLAINT:  Chief Complaint   Patient presents with     Possible of UTI     couple 3 days.            HPI:  Mirta Faria is a 69 y.o. female who is seen for new symptoms of UTI. she  has dysuria, urgency and frequency for the last 3 days. She has tried increasing her water intake. she has also tried cranberry tabs, daily cranberry juice.  She has a long history of multiple UTIs but has not had any history of resistance to antibiotics.  She had her last UTI in January.  She was seen in urgent care also recently for UTI.  She travels to [place] during the winter months is treated in [place] at times for UTIs as well.  She denies any worsening symptoms other than bladder tenderness.  She has never had a pyelonephritis or been hospitalized for a urosepsis.  Review of Systems:  ROS: Patient denies fever, chills, sweats, fainting, fatigue, weight change, dizziness, sleep problems, chest pain, palpitations, shortness of breath, wheezing, cough,   nausea, vomiting, diarrhea, constipation, black or bloody stools,  hematuria, back pain.    PREVIOUS MEDICAL HISTORY  No past medical history on file.  PREVIOUS SURGICAL HISTORY  @surg@  CURRENT MEDICATIONS  Current Outpatient Medications on File Prior to Visit   Medication Sig Dispense Refill     ascorbic acid, vitamin C, (VITAMIN C) 250 MG tablet Take 250 mg by mouth daily.       CALCIUM CARBONATE (CALCIUM 500 ORAL) Take by mouth.       chlorthalidone (HYGROTEN) 25 MG tablet Take 1 tablet (25 mg total) by mouth daily. 90 tablet 1     clobetasol (TEMOVATE) 0.05 % cream APPLY SPARINGLY TO AFFECTED AREA(S) TWICE DAILY 15 g 1     cranberry extract 300 mg Tab Take by mouth.       estradiol (ESTRACE) 0.01 % (0.1 mg/gram) vaginal cream 1 gm vaginally 3 times a week 42.5 g 12     Current Facility-Administered Medications on File Prior to Visit   Medication Dose Route Frequency Provider Last Rate Last Dose     denosumab 60 mg (PROLIA 60 mg/ml)  60 mg Subcutaneous Q6 Months  Papi Bedolla MD   60 mg at 07/30/18 1043         ALLERGIES  Allergies   Allergen Reactions     Tramadol Nausea Only       PROBLEM LIST  Past Medical History:   Diagnosis Date     Colon cancer (H) 2005    benign polyp removed     Urinary tract infection        SOCIAL HISTORY  Social History     Socioeconomic History     Marital status:      Spouse name: Not on file     Number of children: Not on file     Years of education: Not on file     Highest education level: Not on file   Occupational History     Not on file   Social Needs     Financial resource strain: Not on file     Food insecurity:     Worry: Not on file     Inability: Not on file     Transportation needs:     Medical: Not on file     Non-medical: Not on file   Tobacco Use     Smoking status: Never Smoker     Smokeless tobacco: Never Used   Substance and Sexual Activity     Alcohol use: Not on file     Drug use: Not on file     Sexual activity: Not on file   Lifestyle     Physical activity:     Days per week: Not on file     Minutes per session: Not on file     Stress: Not on file   Relationships     Social connections:     Talks on phone: Not on file     Gets together: Not on file     Attends Denominational service: Not on file     Active member of club or organization: Not on file     Attends meetings of clubs or organizations: Not on file     Relationship status: Not on file     Intimate partner violence:     Fear of current or ex partner: Not on file     Emotionally abused: Not on file     Physically abused: Not on file     Forced sexual activity: Not on file   Other Topics Concern     Not on file   Social History Narrative     Not on file     OBJECTIVE:  /62 (Patient Site: Right Arm, Patient Position: Sitting, Cuff Size: Adult Regular)   Pulse 70   Wt 116 lb 4.8 oz (52.8 kg)   LMP 02/04/1995   SpO2 99%   BMI 17.13 kg/m      General: Shows alert and oriented, well-nourished, well-developed, pleasant  male, NAD.Hydration: Good.  Vital  Signs: Pulse 143   Temp 98.2  F (36.8  C)   Wt 28 lb (12.7 kg)   SpO2 96%   General: Appears well, in no apparent distress.   Mouth: mucous membranes moist  Neck: Soft, no adenopathy  Heart: Regular rate and rhythm, no murmurs, clicks or rubs.   Lungs:  Clear to auscultation.  Abdomen: abdomen is soft with mild diffuse tenderness, no guarding, mass, rebound or organomegaly. Suprapubic tenderness.  Back: no CVA tenderness, bilaterally  WORKUP:  The last exams were  Recent Results (from the past 240 hour(s))   Urinalysis-UC if Indicated   Result Value Ref Range    Color, UA Yellow Colorless, Yellow, Straw, Light Yellow    Clarity, UA Clear Clear    Glucose, UA Negative Negative    Bilirubin, UA Negative Negative    Ketones, UA Negative Negative    Specific Gravity, UA 1.015 1.005 - 1.030    Blood, UA Trace (!) Negative    pH, UA 7.0 5.0 - 8.0    Protein, UA Negative Negative mg/dL    Urobilinogen, UA 0.2 E.U./dL 0.2 E.U./dL, 1.0 E.U./dL    Nitrite, UA Positive (!) Negative    Leukocytes, UA Moderate (!) Negative    Bacteria, UA Many (!) None Seen hpf    RBC, UA 0-2 None Seen, 0-2 hpf    WBC, UA >100 (!) None Seen, 0-5 hpf    Squam Epithel, UA 0-5 None Seen, 0-5 lpf         None   ASSESSMENT/PLAN:  1. Dysuria  Urinalysis-UC if Indicated    nitrofurantoin, macrocrystal-monohydrate, (MACROBID) 100 MG capsule    Culture, Urine       Push fluids and watch for any worsening signs of nausea, vomiting or fever. ER if these develop. Cranberry juice and Azo can be helpful.     Follow up with Chandrika Persaud MD  for recheck if not improving.      Domi uBi 12/11/2018 6:03 AM

## 2021-05-28 NOTE — TELEPHONE ENCOUNTER
Left detailed message for Mirta explaining what Dr Masters said and asked pt to call back to schedule.

## 2021-05-30 ENCOUNTER — RECORDS - HEALTHEAST (OUTPATIENT)
Dept: ADMINISTRATIVE | Facility: CLINIC | Age: 71
End: 2021-05-30

## 2021-05-30 NOTE — TELEPHONE ENCOUNTER
Who is calling:  Patient   Reason for Call:  Calling to check on the status of the below messages. She would like to have this order for the UA/UC done ASAP so that she can get the sample to the lab.   Patient was advised that she can go to walk in care at the [facility] and see a provider there and have the labs done.   Patient agreed to the plan and will go to the [facility] care and will follow up with NICOLLE Bui tomorrow  Date of last appointment with primary care: N/A  Okay to leave a detailed message: No Return Call Needed

## 2021-05-30 NOTE — TELEPHONE ENCOUNTER
Orders being requested: UA/UC  Reason service is needed/diagnosis:  Patient is asking if she can drop off a urine sample. Patient believes she has a UTI, see below message. Patient needs an order placed before she can bring in her urine sample. Patient has an establish care appointment with Domi Bui tomorrow.   When are orders needed by: Today  Where to send Orders: Enter into [employer] for [facility] lab.  Okay to leave detailed message?  Yes, please reach out to patient and advise [phone]

## 2021-05-30 NOTE — TELEPHONE ENCOUNTER
Took azo 4 days ago.  5 days of urgency and lower abdominal achyness.  No blood in urine.  No fever.    Goes to [place] for her annual physicals.    Needs to Establish care with provider and made appointment for tomorrow.    Patient would like to drop off urine sample at clinic and have provider Hao treat her over the phone.    Has frequent UTI's.    To provider for review.    Anushka Villagomez, RN, [facility] Nurse Triage/Med Refills RN

## 2021-05-30 NOTE — PROGRESS NOTES
HPI:  Mirta Faria is a 69 y.o. female who is seen for   Chief Complaint   Patient presents with     Establish Care   And to discuss chronic UTIs.  She was seen in my office one other time before she changed providers and this was also for urinary tract infection.  She brings with her all her records from previous UTIs and would also like to have a copy of her records for her testing done yesterday.  She did start Macrobid and states that her symptoms immediately improved.  She is planning a trip to [facility] in about 6 weeks.  She will be gone for 6 weeks.  She would like to have a additional prescription for Macrobid in case she gets an additional UTIs.  Review of her sensitivities for several UTIs show that she generally gets E. coli infections and Macrobid works well every time.  She has had a long history of UTIs since her early 20s.  Patient denies chest pain, palpitations, shortness of breath, wheezing, cough, neck pain, back pain, dysuria, flank pain, abdominal pain, nausea, vomiting, diarrhea, constipation, black or bloody stools, acid reflux, lower leg edema, claudication, muscle weakness, dizziness, headaches, change in vision, changes in hearing, tinnitus, nasal congestion, fever, weight loss.  No results found for: HGBA1C  Lab Results   Component Value Date    LDLCALC 126 12/27/2017    CREATININE 0.67 12/27/2017     Patient Active Problem List   Diagnosis     Hematuria     Pain During Urination (Dysuria)     Osteopenia     Benign Polyps Of The Large Intestine     Hypercalciuria     Lichen Sclerosus Et Atrophicus     Diarrhea     Acute Cystitis     Lumbar herniated disc     Osteoporosis     Family History   Problem Relation Age of Onset     Alzheimer's disease Other      Osteoporosis Other      Social History     Socioeconomic History     Marital status:      Spouse name: None     Number of children: None     Years of education: None     Highest education level: None    Occupational History     None   Social Needs     Financial resource strain: None     Food insecurity:     Worry: None     Inability: None     Transportation needs:     Medical: None     Non-medical: None   Tobacco Use     Smoking status: Never Smoker     Smokeless tobacco: Never Used   Substance and Sexual Activity     Alcohol use: None     Drug use: None     Sexual activity: None   Lifestyle     Physical activity:     Days per week: None     Minutes per session: None     Stress: None   Relationships     Social connections:     Talks on phone: None     Gets together: None     Attends Yarsani service: None     Active member of club or organization: None     Attends meetings of clubs or organizations: None     Relationship status: None     Intimate partner violence:     Fear of current or ex partner: None     Emotionally abused: None     Physically abused: None     Forced sexual activity: None   Other Topics Concern     None   Social History Narrative     None     Past Surgical History:   Procedure Laterality Date     KS EXCISION BRACH CLFT CYST,SUPERFICIAL      Description: 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       CALCIUM CARBONATE (CALCIUM 500 ORAL) Take by mouth.       chlorthalidone (HYGROTEN) 25 MG tablet Take 1 tablet (25 mg total) by mouth daily. 90 tablet 1     clobetasol (TEMOVATE) 0.05 % cream APPLY SPARINGLY TO AFFECTED AREA(S) TWICE DAILY 15 g 1     cranberry extract 300 mg Tab Take by mouth.       estradiol (ESTRACE) 0.01 % (0.1 mg/gram) vaginal cream 1 gm vaginally 3 times a week 42.5 g 12     nitrofurantoin, macrocrystal-monohydrate, (MACROBID) 100 MG capsule Take 1 capsule (100 mg total) by mouth 2 (two) times a day for 7 days. 14 capsule 0     Current Facility-Administered  Medications on File Prior to Visit   Medication Dose Route Frequency Provider Last Rate Last Dose     denosumab 60 mg (PROLIA 60 mg/ml)  60 mg Subcutaneous Q6 Months Papi Bedolla MD   60 mg at 18 1043     Allergies   Allergen Reactions     Tramadol Nausea Only     OB History        1    Para   1    Term   1            AB        Living           SAB        TAB        Ectopic        Multiple        Live Births                   I have reviewed the patient's medical history in detail and updated the computerized patient record.  OBJECTIVE:  Wt Readings from Last 3 Encounters:   19 113 lb (51.3 kg)   19 116 lb 4.8 oz (52.8 kg)   18 114 lb (51.7 kg)     Temp Readings from Last 3 Encounters:   18 97.4  F (36.3  C) (Oral)   17 97.4  F (36.3  C) (Oral)   16 98.1  F (36.7  C) (Oral)     BP Readings from Last 3 Encounters:   19 110/64   19 112/62   18 114/76     Pulse Readings from Last 3 Encounters:   19 73   19 70   18 75     Body mass index is 16.64 kg/m .     Alert, cooperative, well-hydrated. Appears well.  Eyes: Pupils equal, round, reactive to light.  HEENT: Sclera white, nares patent, MMM.  Neck: supple, without lymphadenopathy, Thyroid freely movable and without hypotrophy or nodularity.   Lungs: Clear to auscultation. No retractions, no increased work of respiration, equal chest rise.   Heart: Regular rate and rhythm, no murmurs, clicks,   Gallops.  Abdomen: Soft, slim, scaphoid, bowel sounds in 4 quadrants with no tenderness to palpation, no organomegaly or masses, no aortic or renal bruits.  Extremities: no tenderness to palpation of gastrocnemius, bilaterally.  Skin: no increased warmth, edema, or erythema of lower legs bilaterally.  Back: No cervical, thoracic or lumbar tenderness to spinous processes or musculature.  Neuro::pupils equal and reactive to light bilaterally, CN II - XII grossly intact. No focal  motor/sensory deficits. DTR 2/4 all 4 extremities. Muscle Strength 5/5 all extremities, Rhomberg negative  Labs:  Lab on 07/08/2019   Component Date Value     Color, UA 07/08/2019 Yellow      Clarity, UA 07/08/2019 Clear      Glucose, UA 07/08/2019 Negative      Bilirubin, UA 07/08/2019 Negative      Ketones, UA 07/08/2019 Negative      Specific Gravity, UA 07/08/2019 1.010      Blood, UA 07/08/2019 Trace*     pH, UA 07/08/2019 6.5      Protein, UA 07/08/2019 Negative      Urobilinogen, UA 07/08/2019 0.2 E.U./dL      Nitrite, UA 07/08/2019 Negative      Leukocytes, UA 07/08/2019 Moderate*     Bacteria, UA 07/08/2019 Few*     RBC, UA 07/08/2019 0-2      WBC, UA 07/08/2019 25-50*     Squam Epithel, UA 07/08/2019 None Seen      Culture 07/08/2019 >100,000 col/ml Escherichia coli*   Office Visit on 05/06/2019   Component Date Value     Color, UA 05/06/2019 Yellow      Clarity, UA 05/06/2019 Clear      Glucose, UA 05/06/2019 Negative      Bilirubin, UA 05/06/2019 Negative      Ketones, UA 05/06/2019 Negative      Specific Gravity, UA 05/06/2019 1.015      Blood, UA 05/06/2019 Trace*     pH, UA 05/06/2019 7.0      Protein, UA 05/06/2019 Negative      Urobilinogen, UA 05/06/2019 0.2 E.U./dL      Nitrite, UA 05/06/2019 Positive*     Leukocytes, UA 05/06/2019 Moderate*     Bacteria, UA 05/06/2019 Many*     RBC, UA 05/06/2019 0-2      WBC, UA 05/06/2019 >100*     Squam Epithel, UA 05/06/2019 0-5      Culture 05/06/2019 >100,000 col/ml Escherichia coli*     ASSESMENT/PLAN:  1. Dysuria  Urinalysis-UC if Indicated   2. Visit for screening mammogram  Mammo Screening Bilateral   3. Screen for colon cancer  Ambulatory referral for Colonoscopy   At the end of the visit the patient also mentions a probiotic that was recommended to help prevent UTIs.  Reviewed this with her and she will try this as well as continuing cranberry tabs and cranberry juice daily and plenty of water.  Advised on her trip to drink plenty of water, continue  to urinate before and after sexual activity, discussed postcoital antibiotics since she tends to get UTIs so easily and since she currently is very sexually active, up to 5 times in 1 week we deferred this treatment because she may end up getting some resistance with nearly daily doses of antibiotic.  Did give her a prescription for her trip.  Answered all her questions, she voices understanding.  Domi Bui, MS, PA-C 07/09/19

## 2021-05-30 NOTE — TELEPHONE ENCOUNTER
Describe your symptoms: Patient complains of abdominal cramping, urgency, and aching in abdomen. Patient stated she as a history of UTIs. Patient stated Domi Bui PA-C gave her a cup and she does have a urine sample.    Denies fever.  Any pain: yes  New/Ongoing: New  How long have you been having symptoms: 4  day(s)  Have you been seen for this:  No.  Triage offered?: yes  Home remedies tried: AZO  Pharmacy Name and Location: [facility]  Okay to leave a detailed message? Yes  [phone]

## 2021-05-30 NOTE — TELEPHONE ENCOUNTER
"Lab orders were placed in patient chart.  Patient did not come to the lab the day orders were placed.    Lab orders must be cancelled and reordered as \"Future\" with an expected date.  Thank you  "

## 2021-05-31 VITALS — HEIGHT: 69 IN | BODY MASS INDEX: 16.91 KG/M2 | WEIGHT: 114.19 LBS

## 2021-05-31 VITALS — WEIGHT: 114 LBS | BODY MASS INDEX: 16.83 KG/M2

## 2021-05-31 VITALS — WEIGHT: 112.6 LBS | BODY MASS INDEX: 16.68 KG/M2 | HEIGHT: 69 IN

## 2021-05-31 NOTE — TELEPHONE ENCOUNTER
"Prolia Injection Phone Screen      Screening questions have been asked 2-3 days prior to administration visit for Prolia. If any questions are answered with \"Yes,\" this phone encounter were will routed to ordering provider for further evaluation.     1.  When was the last injection?  7/30/19    2.  Has insurance for this injection been verified?  Yes    3.  Did you experience any new onset achiness or rashes that lasted for over a month with your previous Prolia injection?   No    4.  Do you have a fever over 101?F or a new deep cough that is unusual for you today? No    5.  Have you started any new medications in the last 6 months that you were told could affect your immune system? These may have been prescribed by oncologist, transplant, rheumatology, or dermatology.   No    6.  In the last 6 months have you have gastric bypass or parathyroid surgery?   No    7.  Do you plan dental work requiring drilling into the bone such as implants/extractions or oral surgery in the next 2-3 months?   No    8. Do you have new insurance since the last injection? NO    Patient informed if symptoms discussed above present prior to their administration appointment, they are to notify clinic immediately.     Mariposa Mata            "

## 2021-06-01 NOTE — PROGRESS NOTES
Mammogram shows no malignancy, calcifications only, recheck in 1 year, please call results to the patient or send a letter if not reachable by phone.

## 2021-06-02 ENCOUNTER — RECORDS - HEALTHEAST (OUTPATIENT)
Dept: ADMINISTRATIVE | Facility: CLINIC | Age: 71
End: 2021-06-02

## 2021-06-02 VITALS — BODY MASS INDEX: 16.79 KG/M2 | WEIGHT: 114 LBS

## 2021-06-03 VITALS — WEIGHT: 113 LBS | HEIGHT: 69 IN | BODY MASS INDEX: 16.74 KG/M2

## 2021-06-03 VITALS — WEIGHT: 116.3 LBS | BODY MASS INDEX: 17.13 KG/M2

## 2021-06-04 VITALS
HEART RATE: 73 BPM | RESPIRATION RATE: 16 BRPM | WEIGHT: 112 LBS | TEMPERATURE: 98 F | OXYGEN SATURATION: 97 % | SYSTOLIC BLOOD PRESSURE: 120 MMHG | HEIGHT: 70 IN | BODY MASS INDEX: 16.03 KG/M2 | DIASTOLIC BLOOD PRESSURE: 60 MMHG

## 2021-06-04 VITALS
RESPIRATION RATE: 18 BRPM | DIASTOLIC BLOOD PRESSURE: 66 MMHG | BODY MASS INDEX: 15.94 KG/M2 | HEART RATE: 74 BPM | WEIGHT: 110.75 LBS | OXYGEN SATURATION: 98 % | SYSTOLIC BLOOD PRESSURE: 104 MMHG

## 2021-06-05 NOTE — TELEPHONE ENCOUNTER
Refill Approved    Rx renewed per Medication Renewal Policy. Medication was last renewed on 3/22/17.    Chelsea Cash, [facility] Triage/Med Refill 2020     Requested Prescriptions   Pending Prescriptions Disp Refills     estradiol (ESTRACE) 0.01 % (0.1 mg/gram) vaginal cream 42.5 g 12     Si gm vaginally 3 times a week       Hormone Replacement Therapy Refill Protocol Passed - 1/10/2020  3:54 PM        Passed - PCP or prescribing provider visit in past 12 months       Last office visit with prescriber/PCP: 2019 Domi Bui PA-C OR same dept: 2019 Domi Bui PA-C OR same specialty: 2019 Domi Bui PA-C  Last physical: Visit date not found Last MTM visit: Visit date not found     Next visit within 3 mo: Visit date not found  Next physical within 3 mo: Visit date not found  Prescriber OR PCP: Domi Bui PA-C  Last diagnosis associated with med order: There are no diagnoses linked to this encounter.   If protocol passes may refill for 3 months.

## 2021-06-05 NOTE — TELEPHONE ENCOUNTER
Refill Request  Did you contact pharmacy: Yes  Medication name:   Requested Prescriptions     Pending Prescriptions Disp Refills     estradiol (ESTRACE) 0.01 % (0.1 mg/gram) vaginal cream 42.5 g 12     Si gm vaginally 3 times a week     Who prescribed the medication: Domi Bui PA-C   Requested Pharmacy: [id number]  Is patient out of medication: Yes  Patient notified refills processed in 3 business days:  yes  Okay to leave a detailed message: yes

## 2021-06-08 ENCOUNTER — RECORDS - HEALTHEAST (OUTPATIENT)
Dept: ADMINISTRATIVE | Facility: OTHER | Age: 71
End: 2021-06-08

## 2021-06-09 NOTE — TELEPHONE ENCOUNTER
Who is calling:  Outgoing call to patient   Reason for Call:  I received a call from an RN [facility] as the patient is scheduled to have surgery there, they were wanting to schedule COVID test, asked me to call and inform patient that we are only scheduling [facility] surgical patients for covid testing at this time, let patient know, patient was upset, offered other resources including [place].gov website   Date of last appointment with primary care: none  Okay to leave a detailed message: No

## 2021-06-10 NOTE — PROGRESS NOTES
Assessment & Plan:  1. Pain During Urination (Dysuria)  Patient with recurrent dysuria and limited evidence of infection.  Discus the dx of interstitial cystitis.  How it is inflammatory and not infectious.  Recommend hydration, and trial AZO. Recommended decreasing stress.  She should be fine using the macrobid for treatment when travelling.  She has a prescription at home.  Will refer to urology for further eval.          No orders of the defined types were placed in this encounter.    Medications Discontinued During This Encounter   Medication Reason     cholecalciferol, vitamin D3, (VITAMIN D3) 2,000 unit cap Therapy completed     salicylic acid 27.5 % LiqF Therapy completed       No Follow-up on file.          This note was created use Dragon Dictation.  There may be sound alike errors present.       Chief Complaint:   Chief Complaint   Patient presents with     Urinary Tract Infection     reoccuring  UTI--sx of urgency, burning, frequency x3-4 days       History of Present Illness:  Mirta is a 67 y.o. female presenting to the clinic today wanting to discuss her recurrent dysuria and treatment options when she travels.  Patient had an episode earlier this week and brought in urinalysis which showed trace LE, her urine culture back with no growth.  Reviewed all her UA for the last 2 years.  Most urine cultures were sterile.  Patient states she is she saw urology in [place] - but wa years ago.  No recommendations were made.      Pt travellng to [facility] and would like to have some antibx if needed.      Review of Systems:  All other systems are negative.     PFSH:  reviewed    Tobacco Use:  History   Smoking Status     Never Smoker   Smokeless Tobacco     Never Used       Vitals:  Vitals:    05/18/17 1039   BP: 128/82   Patient Site: Right Arm   Patient Position: Sitting   Cuff Size: Adult Regular   Pulse: 76   Resp: 16   Temp: 97.4  F (36.3  C)   TempSrc: Oral   Weight: 114 lb (51.7 kg)     Wt Readings  from Last 3 Encounters:   05/18/17 114 lb (51.7 kg)   12/22/16 111 lb (50.3 kg)   12/15/16 110 lb 3.2 oz (50 kg)     Body mass index is 16.83 kg/(m^2).      Physical Exam:  Constitutional:  Reveals an alert, cooperative,  female in no acute distress.  Vitals:  Per nursing notes.  Neck:  Supple, no lymphadenopathy,  thyroid not palpable.  Cardiac:  Regular rate and rhythm without murmurs, rubs, or gallops.    Lungs: Clear.  Respiratory effort normal.  Abdomen:  non-tender, non-distended.  Neither liver nor spleen palpable.  Skin:   Without rash, bruise, or palpable lesions.    Lab Results   Component Value Date    COLORU Yellow 05/16/2017    CLARITYU Slightly Cloudy (!) 05/16/2017    GLUCOSEU Negative 05/16/2017    BILIRUBINUR Negative 05/16/2017    KETONESU Negative 05/16/2017    SPECGRAV 1.015 05/16/2017    HGBUA Negative 05/16/2017    PHUR 7.0 05/16/2017    PROTEINUA Negative 05/16/2017    UROBILINOGEN 0.2 E.U./dL 05/16/2017    NITRITE Negative 05/16/2017    LEUKOCYTESUR Small (!) 05/16/2017                                  Data Reviewed:  Additional history summarized (from old records or history from someone other than the patient or another healthcare provider) (2 TOTAL): 2 reviewed urology notes.     Decision to obtain extra information (old records requested or history from another person or accessing Care Everywhere) (1 TOTAL): 1 - requests notes from [facility].     Radiology tests summarized or ordered (XRAY/CT/MRI/DXA) (1 TOTAL): None.    Labs reviewed or ordered (1 TOTAL): 1 labs reviewed.    Medicine tests summarized or ordered (EKG/ECHO) (1 TOTAL): None.    Independent review of EKG or X-Ray (2 EACH): None.       The visit lasted a total of 30 minutes face to face with the patient. Over 50% of the time was spent counseling and educating the patient about dysuria.        Medications:  Current Outpatient Prescriptions   Medication Sig Dispense Refill     CALCIUM CARBONATE (CALCIUM 500 ORAL) Take  by mouth.       chlorthalidone (HYGROTEN) 25 MG tablet Take 1 tablet (25 mg total) by mouth daily. 90 tablet 3     clobetasol (TEMOVATE) 0.05 % cream APPLY SPARINGLY TO AFFECTED AREA(S) TWICE DAILY 15 g 1     cranberry extract 300 mg Tab Take by mouth.       estradiol (ESTRACE) 0.01 % (0.1 mg/gram) vaginal cream 1 gm vaginally 3 times a week 42.5 g 12     Current Facility-Administered Medications   Medication Dose Route Frequency Provider Last Rate Last Dose     denosumab 60 mg (PROLIA 60 mg/ml)  60 mg Subcutaneous Q6 Months Papi Bedolla MD   60 mg at 12/23/16 0945       Total Data Points: 4

## 2021-06-10 NOTE — TELEPHONE ENCOUNTER
ABDIFATAHTCNOHELIA and schedule an osteo follow up with Dr. Bedolla for her prolia injection. Per  Ilene Rowland, pt should not be able to get prolia shot if she has not seen Dr. Bedolla in 4 years. They should really be following up at least every 2 years if not every year. Pt informed in VM that we cannot safely give prolia shot on Monday, she will need to follow up with Dr. Bedolla to get this shot

## 2021-06-10 NOTE — PATIENT INSTRUCTIONS - HE
"1. Recheck 24 hour urine calcium.  Skip calcium supplement for several days before checking.    2.  Recheck bone density after 11/05    3.  Consider Reclast next fall, and then begin a \"drug free\" holiday.    4.  Check vitamin D level today      5.  Clinic follow-up in 1 year                                                            Skip   "

## 2021-06-10 NOTE — TELEPHONE ENCOUNTER
Patient coming to [facility] for Prolia on 8/24/20. Orders placed, please sign. Thanks.    Order needs second dx.     Patient hasn't seen you since 2016, when do you need her to follow up with you?

## 2021-06-10 NOTE — TELEPHONE ENCOUNTER
Per patient called. She said that she has a Provider in [place] who she see and only receives injections up here in [place] when she is back here every 6 months. She has been doing this with Dr. Bedolla and the [place] office for the past 3 years.     Please call her @ [phone]

## 2021-06-10 NOTE — PROGRESS NOTES
Preoperative Exam    Scheduled Procedure: vitrectomy membrane peel brilliant blue air fluid exchange  Surgery Date:  8/12/20  Surgery Location: [facility], [phone]  Surgeon:  Dr. Mandy Bull    Assessment/Plan:     1. Pre-op exam  Hemoglobin    Basic Metabolic Panel   2. Hyperkalemia  Basic Metabolic Panel   3. Pathologic vitreous membrane, unspecified laterality  Hemoglobin    Basic Metabolic Panel         Surgical Procedure Risk: Low (reported cardiac risk generally < 1%)  Have you had prior anesthesia?: Yes  Have you or any family members had a previous anesthesia reaction:  Yes: tramadol caused vomiting after a procedure  Do you or any family members have a history of a clotting or bleeding disorder?: No  Cardiac Risk Assessment: no increased risk for major cardiac complications    APPROVAL GIVEN to proceed with proposed procedure, without further diagnostic evaluation    Please Note:  no apnea    Functional Status: Independent  Patient plans to recover at home with family.     Subjective:      Mirta Faria is a 70 y.o. female who presents for a preoperative consultation.  She has no history of cadiac disease. She is a non-smoker and has no history of diabetes or sleep apnea.      All other systems reviewed and are negative, other than those listed in the HPI.    Pertinent History  Do you have difficulty breathing or chest pain after walking up a flight of stairs: No  History of obstructive sleep apnea: No  Steroid use in the last 6 months: No  Frequent Aspirin/NSAID use: No  Prior Blood Transfusion: No  Prior Blood Transfusion Reaction: No  If for some reason prior to, during or after the procedure, if it is medically indicated, would you be willing to have a blood transfusion?:  The patient REFUSES transfusion.    Current Outpatient Medications   Medication Sig Dispense Refill     ascorbic acid, vitamin C, (VITAMIN C) 250 MG tablet Take 250 mg by mouth daily.       CALCIUM  CARBONATE (CALCIUM 500 ORAL) Take by mouth.       chlorthalidone (HYGROTEN) 25 MG tablet Take 1 tablet (25 mg total) by mouth daily. 90 tablet 1     clobetasol (TEMOVATE) 0.05 % cream APPLY SPARINGLY TO AFFECTED AREA(S) TWICE DAILY 15 g 1     cranberry extract 300 mg Tab Take by mouth.       estradiol (ESTRACE) 0.01 % (0.1 mg/gram) vaginal cream 1 gm vaginally 3 times a week 42.5 g 2     Current Facility-Administered Medications   Medication Dose Route Frequency Provider Last Rate Last Dose     denosumab 60 mg (PROLIA 60 mg/ml)  60 mg Subcutaneous Q6 Months Ya Gunderson, PharmD   60 mg at 08/12/19 1001        Allergies   Allergen Reactions     Tramadol Nausea Only       Patient Active Problem List   Diagnosis     Hematuria     Pain During Urination (Dysuria)     Osteopenia     Benign Polyps Of The Large Intestine     Hypercalciuria     Lichen Sclerosus Et Atrophicus     Diarrhea     Acute Cystitis     Lumbar herniated disc     Osteoporosis       Past Medical History:   Diagnosis Date     Colon cancer (H) 2005    benign polyp removed     Urinary tract infection        Past Surgical History:   Procedure Laterality Date     WA EXCISION BRACH CLFT CYST,SUPERFICIAL      Description: Surgery Excis Of Branchial Cleft Cyst Confined Skin Subcut T;  Recorded: 08/18/2008;       Social History     Socioeconomic History     Marital status:      Spouse name: Not on file     Number of children: Not on file     Years of education: Not on file     Highest education level: Not on file   Occupational History     Not on file   Social Needs     Financial resource strain: Not on file     Food insecurity     Worry: Not on file     Inability: Not on file     Transportation needs     Medical: Not on file     Non-medical: Not on file   Tobacco Use     Smoking status: Never Smoker     Smokeless tobacco: Never Used   Substance and Sexual Activity     Alcohol use: Not on file     Drug use: Not on file     Sexual activity: Not on  "file   Lifestyle     Physical activity     Days per week: Not on file     Minutes per session: Not on file     Stress: Not on file   Relationships     Social connections     Talks on phone: Not on file     Gets together: Not on file     Attends Anabaptism service: Not on file     Active member of club or organization: Not on file     Attends meetings of clubs or organizations: Not on file     Relationship status: Not on file     Intimate partner violence     Fear of current or ex partner: Not on file     Emotionally abused: Not on file     Physically abused: Not on file     Forced sexual activity: Not on file   Other Topics Concern     Not on file   Social History Narrative     Not on file       Patient Care Team:  Domi Bui PA-C as PCP - General (Physician Assistant)  Domi Bui PA-C as Assigned PCP          Objective:     Vitals:    07/29/20 1548   BP: 120/60   Pulse: 73   Resp: 16   Temp: 98  F (36.7  C)   TempSrc: Oral   SpO2: 97%   Weight: 112 lb (50.8 kg)   Height: 5' 9.9\" (1.775 m)   LMP: 02/04/1995         Physical Exam:  Alert, cooperative, well-hydrated.  Appears well.  Eyes: Pupils equal, round, reactive to light.  HEENT: Sclera white, nares patent, MMM   Lungs: Clear to auscultation. No retractions, no increased work of respiration, equal chest rise.   Heart: Regular rate and rhythm, no murmurs, clicks,    Gallops.  Abdomen: Soft, bowel sounds in 4 quadrants with no tenderness to palpation, no organomegaly or masses, no aortic or renal bruits.  Extremities: no tenderness to palpation of gastrocnemius, bilaterally.  Skin: no increased warmth, edema, or erythema of lower legs bilaterally.  Back:  No cervical, thoracic or lumbar tenderness to spinous processes or musculature.    Neuro: pupils equal and reactive to light bilaterally, CN II - XII  intact. No focal motor/sensory deficits. DTR 2/4 all extremities    There are no Patient Instructions on file for this " visit.        Immunization History   Administered Date(s) Administered     DT (pediatric) 08/24/2000     DTP / HiB 08/24/2000     Hep A, historic 12/12/2002, 07/03/2003     IPV 03/04/2005     Influenza N2a3-87, 12/18/2009     Influenza, Seasonal, Inj PF IIV3 11/23/2009, 10/21/2016     Influenza, inj, historic,unspecified 10/01/2012, 10/21/2016     Influenza, seasonal,quad inj 6-35 mos 10/11/2013, 11/14/2014     Influenza,seasonal, Inj IIV3 11/23/2009, 10/25/2010, 10/03/2011, 09/10/2012, 10/11/2013, 11/14/2014     Influenza,seasonal,quad inj =/> 6months 11/06/2015     Pneumo Polysac 23-V 12/22/2016     Td, Adult, Absorbed 08/24/2000     Td,adult,historic,unspecified 08/20/2000, 08/18/2008     Tdap 08/18/2008, 11/08/2012, 06/04/2018     Typhoid Live, Oral 12/13/2002     Typhoid, Inj, Inactive 12/12/2002, 01/15/2009     Yellow Fever 12/12/2002     ZOSTER, LIVE 06/01/2011, 09/19/2011           Electronically signed by Domi Bui PA-C 07/29/20 3:51 PM

## 2021-06-10 NOTE — PROGRESS NOTES
"ASSESSMENT:  1.  Osteoporosis:    She started Prolia in December 2016.  She has had excellent tolerance of the medication.  She has not had any fractures.  Most recent bone density in July 2018 revealed a 6.7% increase in the spine and 4.2% in the left hip.  She should have a recheck of bone density.  I would favor continuing Prolia for now.  Assuming a good response is noted on bone density, I would give 1 dose of IV Reclast next year, then begin a \"drug-free holiday \".    2.  Idiopathic hypercalciuria:  She was noted to have an elevated urine calcium level in 2016.  She was started on chlorthalidone due to this.  Her primary care physician cut the chlorthalidone to every other day due to relatively low blood pressure.  24-hour urine calcium will be rechecked    PLAN:  1.  Schedule DEXA scan    2.  Recheck vitamin D level.  This was rather high in 2016.    3.  BMI is lower than optimal.  She has had difficulties keeping weight on.    4.  Check 24-hour urine calcium on the current chlorthalidone dose.  She should skip her calcium supplement for several days prior to the urine collection.    5.  Give Prolia today and in 6 months.  See in 1 year.  Assuming bone density looks good, I then would stop Prolia, give 1 infusion of Reclast, and begin a \"drug-free holiday \".    Orders Placed This Encounter   Procedures     DXA Bone Density Scan     Standing Status:   Future     Standing Expiration Date:   8/24/2021     Order Specific Question:   Can the procedure be changed per Radiologist protocol?     Answer:   Yes     Vitamin D, Total (25-Hydroxy)     Calcium, 24 Hour Urine     Standing Status:   Future     Standing Expiration Date:   8/24/2021     Medications Discontinued During This Encounter   Medication Reason     chlorthalidone (HYGROTEN) 25 MG tablet      Administrations This Visit     denosumab 60 mg (PROLIA 60 mg/ml)     Admin Date  08/24/2020 Action  Given Dose  60 mg Route  Subcutaneous Administered By  Harpreet, " Mariposa SNELL LPN              Return in about 1 year (around 8/24/2021) for Recheck.    ASSESSED PROBLEMS:  1. Osteoporosis  DXA Bone Density Scan    Vitamin D, Total (25-Hydroxy)   2. HTN (hypertension)  chlorthalidone (HYGROTEN) 25 MG tablet   3. Hypercalciuria  Calcium, 24 Hour Urine       CHIEF COMPLAINT:  Chief Complaint   Patient presents with     Follow-up     Injections     Prolia       HISTORY OF PRESENT ILLNESS:  Mirta is a 70 y.o. female seen for follow-up of osteoporosis.  She last was seen in December 2016.  Lowest T score was -2.6 in the AP spine.  She was started on Prolia after discussing management options.  She also was noted to have an elevated urinary calcium and was started on chlorthalidone.  Follow-up bone density testing in July 2018 revealed a 6.7% increase in bone density in the spine and 4.2% in the left hip.  She reports that she has tolerated Prolia without adverse effects.  She has not had any fractures.  She did cut chlorthalidone to every other day due to a relatively low blood pressure.    She has no other acute complaints and is in good spirits.    REVIEW OF SYSTEMS:    Comprehensive review of systems is negative.    PFSH:  .  Lives independently and is quite active    TOBACCO USE:  Social History     Tobacco Use   Smoking Status Never Smoker   Smokeless Tobacco Never Used       VITALS:  Vitals:    08/24/20 1133   BP: 104/66   Patient Site: Left Arm   Patient Position: Sitting   Cuff Size: Adult Regular   Pulse: 74   Resp: 18   SpO2: 98%   Weight: 110 lb 12 oz (50.2 kg)     Wt Readings from Last 3 Encounters:   08/24/20 110 lb 12 oz (50.2 kg)   07/29/20 112 lb (50.8 kg)   07/09/19 113 lb (51.3 kg)       PHYSICAL EXAM:  Constitutional:   Reveals an alert pleasant slender woman with appropriate affect.  She ambulates easily without assistance.  She does not need to use her arms to get up from a chair.  .  Vitals: per nursing notes.  HEENT: Atraumatic  Neck:  Supple, no carotid  bruits or adenopathy.  Back:  No spine or CVA pain.  No abnormal kyphosis  Thorax:  No bony deformities.  Lungs: Clear to A&P without rales or wheezes.  Respiratory effort normal.  Cardiac:   Regular rate and rhythm, normal S1, S2, no murmur or gallop..  Extremities:   No peripheral edema..    Skin:  No jaundice, peripheral cyanosis or lesions to suggest malignancy.  Neuro:  Alert and oriented.  No gross focal deficits.  Psychiatric:  Memory intact, mood appropriate.    DATA REVIEWED:  Additional History from Old Records Summarized (2): None.  Decision to Obtain Records (1): None.   Radiology Tests Summarized or Ordered (1): Previous DEXA scans reviewed and new DEXA ordered  Labs Reviewed or Ordered (1): Old labs reviewed.  Vitamin D level and 24-hour urine calcium ordered  Medicine Test Summarized or Ordered (1): None.   Independent Review of EKG or X-RAY(2 each): None.    The visit lasted a total of 26 minutes face to face with the patient. Over 50% of the time was spent counseling and educating the patient about management of osteoporosis    Dragon dictation was used for this note. Speech recognition errors are a possibility.     MEDICATIONS:  Current Outpatient Medications   Medication Sig Dispense Refill     ascorbic acid, vitamin C, (VITAMIN C) 250 MG tablet Take 250 mg by mouth daily.       CALCIUM CARBONATE (CALCIUM 500 ORAL) Take by mouth.       chlorthalidone (HYGROTEN) 25 MG tablet One tab every other day 90 tablet 13     clobetasol (TEMOVATE) 0.05 % cream APPLY SPARINGLY TO AFFECTED AREA(S) TWICE DAILY 15 g 1     cranberry extract 300 mg Tab Take by mouth.       estradiol (ESTRACE) 0.01 % (0.1 mg/gram) vaginal cream 1 gm vaginally 3 times a week 42.5 g 2     Current Facility-Administered Medications   Medication Dose Route Frequency Provider Last Rate Last Dose     denosumab 60 mg (PROLIA 60 mg/ml)  60 mg Subcutaneous Q6 Months Papi Bedolla MD   60 mg at 08/24/20 8697

## 2021-06-10 NOTE — PROGRESS NOTES
"Prolia Injection Phone Screen      Screening questions have been asked 2-3 days prior to administration visit for Prolia. If any questions are answered with \"Yes,\" this phone encounter were will routed to ordering provider for further evaluation.     1.  When was the last injection?  2/12/20 in [place]    2.  Has insurance for this injection been verified?  Yes    3.  Did you experience any new onset achiness or rashes that lasted for over a month with your previous Prolia injection?   No    4.  Do you have a fever over 101?F or a new deep cough that is unusual for you today? No    5.  Have you started any new medications in the last 6 months that you were told could affect your immune system? These may have been prescribed by oncologist, transplant, rheumatology, or dermatology.   No    6.  In the last 6 months have you have gastric bypass or parathyroid surgery?   No    7.  Do you plan dental work requiring drilling into the bone such as implants/extractions or oral surgery in the next 2-3 months?   No    8. Do you have new insurance since the last injection? No    Patient informed if symptoms discussed above present prior to their administration appointment, they are to notify clinic immediately.      The following steps were completed to comply with the REMS program for Prolia:  1. Ordering provider has previously reviewed information in the Medication Guide and Patient Counseling Chart, including the serious risks of Prolia  and the symptoms of each risk and have been advised to seek prompt medical attention if they have signs or symptoms of any of the serious risks.  2. Provided each patient a copy of the Medication Guide and Patient Brochure.  See MAR for administration details.   Indication: Prolia  (denosumab) is a prescription medicine used to treat osteoporosis in patients who:   Are at high risk for fracture, meaning patients who have had a fracture related to osteoporosis, or who have multiple risk " factors for fracture; Cannot use another osteoporosis medicine or other osteoporosis medicines did not work well.   The timeline for early/late injections would be 4 weeks early and any time after the 6 month jose luis. If a patient receives their injection late, then the subsequent injection would be 6 months from the date that they actually received the injection    Have the screening questions been asked prior to this administration? Yes

## 2021-06-12 NOTE — PROGRESS NOTES
Labs done at pre-op exam look well. Sodium was very slightly low, if you would like you can increase amount of sodium in the diet a bit leading up to surgery. Otherwise cleared from a lab perspective.

## 2021-06-12 NOTE — PROGRESS NOTES
Assessment:     Mirta Faria was seen in preoperative consultation in preparation for Combined cataract retina surgery left eye (KPE PCL with ERM Peel Left eye) with subsequent right eye KPE PCL 1-2 weeks after 1st procedure. . This is a low risk surgery and the patient has no increased risk for major cardiac complications based on exam and history and appears to be medically stable for the proposed procedure. Cleared pending updated date of surgery and specific surgical details which the patient will call to relay.      67 y.o. female with planned surgery as above.    Known risk factors for perioperative complications: None    Difficulty with intubation is not anticipated.    Cardiac Risk Estimation: low risk.    Current medications which may produce withdrawal symptoms if withheld perioperatively: none       Plan:      1. Preoperative workup as follows hemoglobin, electrolytes, creatinine, glucose.  2. Change in medication regimen before surgery: none, continue medication regimen including morning of surgery, with sip of water.  3. Prophylaxis for cardiac events with perioperative beta-blockers: not indicated.  4. Invasive hemodynamic monitoring perioperatively: not indicated.  5. Deep vein thrombosis prophylaxis postoperatively:regimen to be chosen by surgical team.  6. Surveillance for postoperative MI with ECG immediately postoperatively and on postoperative days 1 and 2 AND troponin levels 24 hours postoperatively and on day 4 or hospital discharge (whichever comes first): not indicated.  7. Other measures: none      Subjective:      Mirta Faria is a 67 y.o. female who presents to the office today for a preoperative consultation at the request of surgeon Dr. Bartholomew and Dr. Bull who plans on performing Combined cataract retina surgery left eye (KPE PCL with ERM Peel Left eye) with subsequent right eye KPE PCL 1-2 weeks after 1st procedure.  on September 20. This consultation is requested for the  specific conditions prompting preoperative evaluation (i.e. because of potential affect on operative risk): none. Planned anesthesia: general. The patient has the following known anesthesia issues: none. Patients bleeding risk: no recent abnormal bleeding and no remote history of abnormal bleeding. Patient does not have objections to receiving blood products if needed.    History of present illness: Worsening vision over the past several months, patient does need bilateral cataract surgery.  On exam a few days ago the doctor saw some scar tissue on the left retina and patient is following up with retina specialist tomorrow for further options and potential interventions that may be needed.  She will let us know the exact date and procedure details, however she believes they will be doing a combination surgery where cataract is repaired and the deal with the retinal issue.  Most likely they will complete cataract repair on the right eye couple of weeks after the first procedure.  Otherwise patient quite healthy.Takes no chronic medications except chlorthalidone for blood pressure.  Blood pressure is stable on her current medication regime.  No recent fever, chills, shortness of breath or other signs of illness.  No recent chest pain or palpitations.  Patient is tolerated previous procedures well with similar anesthesia.  No anesthesia reactions prior.  No history of bleeding disorder or family history of bleeding disorder that is known.  Patient can perform between 4 and 10 METS.    The following portions of the patient's history were reviewed and updated as appropriate: allergies, current medications, past family history, past medical history, past social history, past surgical history and problem list.    Review of Systems  A 12 point comprehensive review of systems was negative except as noted.      Objective:     /58 (Patient Site: Left Arm, Patient Position: Sitting, Cuff Size: Adult Regular)  Pulse 68   "Resp 16  Ht 5' 9\" (1.753 m)  Wt 114 lb 3 oz (51.8 kg)  LMP 02/04/1995  BMI 16.86 kg/m2  General: Patient appears to be in no acute distress.  Eyes: PERRLA  Ears: No nodules, lesions, masses, discharge or tenderness in auricles or mastoid area. No cerumen in ear canals. Tympanic membranes pearly gray, normal bony landmarks and cone of light bilaterally, no bulges or perforations.   Oropharynx: Buccal mucosa pink, moist, no lesions. Tongue midline, spongy, pink. Uvula midline. Pharynx with no erythema, no exudates. Tonsils + 1.  Neck: Full range of motion, no pain with palpation of spine or paraspinal muscles.  Lungs: Unlabored. clear breath sounds heard throughout lung fields.   Heart: Regular rate and rhythm.  Abdomen: Soft rounded abdomen,  no CVA tenderness.    Musculoskeletal: muscles appear symmetric, spine and extremities in good alignment.  Neuro: Coordinated, smooth gait, balance, and cranial nerves II-XII grossly intact. DTR's+2 bilaterally brachioradialis, knee, ankle. Rhomberg s wnl.        Predictors of intubation difficulty:   Morbid obesity? no   Anatomically abnormal facies? no   Prominent incisors? no   Receding mandible? no   Short, thick neck? no   Neck range of motion: normal   Mallampati score: I (soft palate, uvula, fauces, and tonsillar pillars visible)   Dentition: No chipped, loose, or missing teeth.    Cardiographics  ECG: no prior ECG  Echocardiogram: not done    Imaging  Chest x-ray: normal     Lab Review   Lab Results   Component Value Date     (L) 09/13/2017    K 3.9 09/13/2017    CL 95 (L) 09/13/2017    CO2 30 09/13/2017    BUN 14 09/13/2017    CREATININE 0.79 09/13/2017    CALCIUM 9.1 09/13/2017     Lab Results   Component Value Date    WBC 4.6 11/06/2015    HGB 12.5 09/13/2017    HCT 45.1 11/06/2015    MCV 97 11/06/2015     11/06/2015      Fernanda Hutchison CNP    This note has been dictated using voice recognition software. Any grammatical or context distortions are " unintentional and inherent to the software

## 2021-06-14 NOTE — TELEPHONE ENCOUNTER
Spoke with pt and relayed that dose of Prolia is always 60 mg and that shot comes prefilled.  Pt understanding.

## 2021-06-14 NOTE — TELEPHONE ENCOUNTER
Patient called. She was driving while she received the message and forgot what she was told. She would like another call back with the dose information.     Mirta @ [phone]

## 2021-06-14 NOTE — TELEPHONE ENCOUNTER
Patient called. She wants to know the dosage amount of her last prolia shot.     Mirta @ [phone]

## 2021-06-15 NOTE — PROGRESS NOTES
Assessment and Plan:       1. Annual physical exam  Immunizations reviewed and updated .  Alcohol use, safety and moderation discussed.  Recommended adequate calcium intake/osteoporosis prevention.  Discussed colon cancer screening at age 50, 45 if -American.  Diet and exercise reviewed, including goal of aerobic exercise 30-90 minutes most days of the week, moderation of portions sizes, avoiding eating out and fast food and increase in fruits and vegetables.  Discussed sun protection.  Discussed weight management.    - Lipid Cascade  - Comprehensive Metabolic Panel  - HM2(CBC w/o Differential)    2. Recurrent cystitis  Patient states that she is doing better since using Azo.  I explained that Azo is not an antibiotic but just an agent to relieve pain.  Recommend that she not use it longer than 24 hours and if she continues to have pain she needs to be evaluated for bladder infection.  Patient states she just gets a little burning this clears it up after 1 dose and does not continue to have the symptoms and does not feel it develops into a bladder infection.    3. Osteoporosis  She with a history of osteoporosis followed by Dr. Tena.  She was started on chlorthalidone due to her hypercalciuria.  Patient's potassium had been elevated so I stopped that and she is doing fine on just the chlorthalidone.  She will continue it at this time.  As her blood pressure is low she is going to take it every other day    4. Lichen Sclerosus Et Atrophicus  Patient with a history of lichen sclerosis.  Currently stable.  She uses that clobetasol as needed          The patient's current medical problems were reviewed.    I have had an Advance Directives discussion with the patient.  The following health maintenance schedule was reviewed with the patient and provided in printed form in the after visit summary: Health Maintenance   Topic Date Due     PNEUMOCOCCAL CONJUGATE VACCINE FOR ADULTS (PCV13 OR PREVNAR)  04/30/2016      INFLUENZA VACCINE RULE BASED (1) 08/01/2017     MAMMOGRAM  07/21/2018     FALL RISK ASSESSMENT  09/13/2018     DXA SCAN  10/21/2018     COLONOSCOPY  12/10/2019     ADVANCE DIRECTIVES DISCUSSED WITH PATIENT  12/22/2021     TD 18+ HE  11/08/2022     PNEUMOCOCCAL POLYSACCHARIDE VACCINE AGE 65 AND OVER  Completed     ZOSTER VACCINE  Completed        Subjective:   Chief Complaint: Mirta Faria is an 67 y.o. female here for an Annual Wellness visit.   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 She will leave for [place] in two days until the end of April 2018.      Osteoporosis: She is seen by Dr. Bedolla for osteoporosis. She is taking biannual Prolia injections as well as chlorthalidone for calcium phosphate balance.     Frequent UTIs: She has not had a UTI that has required taking prescription medication. She has been taking Azo and feels that it works well as a preventative as well. She has discomfort during urination when she has a UTI and is able to tell when symptoms become onset. She takes cranberry supplements regularly.     Review of Systems:  She has a mammogram every other year; she is due in June. She declines discussion of a colonoscopy at this time. She declines second Prevnar vaccine. She does breast exams at home. Her baseline blood pressure is around 108/68 to 120/82, which has slowly been decreasing lately. Please see above.  The rest of the review of systems are negative for all systems.    Patient Care Team:  Chandrika Persaud MD as PCP - General   Dr. Bedolla - osteoporosis.   Dr. Devries - [facility]  Dr. Kidd - urology       Patient Active Problem List   Diagnosis     Hematuria     Pain During Urination (Dysuria)     Osteopenia     Benign Polyps Of The Large Intestine     Hypercalciuria     Lichen Sclerosus Et Atrophicus     Diarrhea     Acute Cystitis     Lumbar herniated disc     Osteoporosis     Past Medical History:   Diagnosis Date     Colon cancer 2005     "benign polyp removed     Urinary tract infection       Past Surgical History:   Procedure Laterality Date     IA EXCISION BRACH CLFT CYST,SUPERFICIAL      Description: Surgery Excis Of Branchial Cleft Cyst Confined Skin Subcut T;  Recorded: 08/18/2008;      Family History   Problem Relation Age of Onset     Alzheimer's disease Other      Osteoporosis Other       Social History     Social History     Marital status:      Spouse name: N/A     Number of children: N/A     Years of education: N/A     Occupational History     Not on file.     Social History Main Topics     Smoking status: Never Smoker     Smokeless tobacco: Never Used     Alcohol use Not on file     Drug use: Not on file     Sexual activity: Not on file     Other Topics Concern     Not on file     Social History Narrative      Current Outpatient Prescriptions   Medication Sig Dispense Refill     ascorbic acid, vitamin C, (VITAMIN C) 250 MG tablet Take 250 mg by mouth daily.       CALCIUM CARBONATE (CALCIUM 500 ORAL) Take by mouth.       chlorthalidone (HYGROTEN) 25 MG tablet Take 1 tablet (25 mg total) by mouth daily. 90 tablet 3     clobetasol (TEMOVATE) 0.05 % cream APPLY SPARINGLY TO AFFECTED AREA(S) TWICE DAILY 15 g 1     cranberry extract 300 mg Tab Take by mouth.       estradiol (ESTRACE) 0.01 % (0.1 mg/gram) vaginal cream 1 gm vaginally 3 times a week 42.5 g 12     No current facility-administered medications for this visit.       Objective:   Vital Signs:   Visit Vitals     /68 (Patient Site: Left Arm, Patient Position: Sitting, Cuff Size: Adult Regular)     Pulse 68     Resp 16     Ht 5' 9.09\" (1.755 m)     Wt 112 lb 9.6 oz (51.1 kg)     LMP 02/04/1995     BMI 16.58 kg/m2        VisionScreening:  No exam data present     PHYSICAL EXAM  Constitutional:  Reveals an alert, cooperative, pleasant female in no acute distress.  Vitals:  Per nursing notes.  Ears:  Clear.  Oropharynx:  Without posterior nasal drainage or thrush.  Neck:  " Supple, no lymphadenopathy,  thyroid not palpable.  Cardiac:  Regular rate and rhythm without murmurs, rubs, or gallops. Carotids without bruits. Legs without edema.   Lungs: Clear.  Respiratory effort normal.  Neurologic:  No gross focal deficits.    Psychiatric:  Mood appropriate, memory intact.       Assessment Results 12/27/2017   Activities of Daily Living No help needed   Instrumental Activities of Daily Living No help needed   Mini Cog Total Score 5   Some recent data might be hidden     A Mini-Cog score of 0-2 suggests the possibility of dementia, score of 3-5 suggests no dementia    Identified Health Risks:     ADDITIONAL HISTORY SUMMARIZED (2): None.  DECISION TO OBTAIN EXTRA INFORMATION (1): None.   RADIOLOGY TESTS (1): None.  LABS (1): Reviewed and ordered labs.   MEDICINE TESTS (1): None.  INDEPENDENT REVIEW (2 each): None.     The visit lasted a total of 29 minutes face to face with the patient. Over 50% of the time was spent counseling and educating the patient about medication management    I, Karyna Haynes, am scribing for and in the presence of, Dr. Persaud.    I, Dr. Chandrika Persaud MD, personally performed the services described in this documentation, as scribed by Karyna Haynes in my presence, and it is both accurate and complete.    Total data points: 1

## 2021-06-15 NOTE — TELEPHONE ENCOUNTER
"Reason for Call:  Other prescription     Detailed comments: Patient calling this morning with questions about a few medications. She declined to give writer any information about which medications she has questions on, but did tell writer that they are not medications she is currently taking.  Attempted to get more information from patient, she continually declined and simply stated \"I want to talk to a provider about this\".  She was kind on the phone, but refused to give any more information or detail.    Phone Number Patient can be reached at: Home number on file [phone] (home)    Best Time: Any time    Can we leave a detailed message on this number?: Yes    Call taken on 3/1/2021 at 9:31 AM by Gumaro Talbert    "

## 2021-06-15 NOTE — TELEPHONE ENCOUNTER
Called to patient to see what questions she has. Patient stated that she had questions for Domi Bui. Explained to patient that she no longer works here and she stated never mind and did not want to leave any information

## 2021-06-17 NOTE — TELEPHONE ENCOUNTER
Spoke to pt and she does not need a physical or AWV visit. Just looking for a new PCP. Told her that would be fine for her appt on 5/19

## 2021-06-17 NOTE — TELEPHONE ENCOUNTER
New Appointment Needed  What is the reason for the visit:    Pre-Op Appt Request  When is the surgery? :  6/01/2021 & 6/9/2021  Where is the surgery?:   [facility]   Who is the surgeon? : Dr Bartholomew  What type of surgery is being done?: cataract  Provider Preference: would prefer Dr Haas   How soon do you need to be seen?: would like to do appt same day as est care appointment 5/12/2021  Waitlist offered?: no  Okay to leave a detailed message:  Yes

## 2021-06-17 NOTE — TELEPHONE ENCOUNTER
It depends on what you mean by establish care - I will see her as a new pt for the preop.  We cannot do a preventative visit, like an annual wellness, with the preop

## 2021-06-17 NOTE — PROGRESS NOTES
[facility]  [address] [facility] [address]  Dept: [phone]  Dept Fax: [phone]  Primary Provider: Domi Bui PA-C  Pre-op Performing Provider: VLADIMIR RODRIGUEZ      PREOPERATIVE EVALUATION:  Today's date: 5/19/2021    Mirta Faria is a 71 y.o. female who presents for a preoperative evaluation.    Surgical Information:  Surgery/Procedure: Monofocal IOL  Surgery Location: [facility]  Surgeon: Dr. Bartholomew  Surgery Date: 6/1/21  Time of Surgery: 1pm  Where patient plans to recover: At home with family  Fax number for surgical facility: [phone]    Type of Anesthesia Anticipated: Local    1. Preop general physical exam  Mirta is approved for cataract procedure.  This is a low risk procedure so no blood work or EKG is required.    2. Age-related cataract of both eyes, unspecified age-related cataract type    3. Osteoporosis, unspecified osteoporosis type, unspecified pathological fracture presence  She follows with endocrinology and gets Prolia injections.  She was taking chlorthalidone for hypercalciuria, but this was held due to low blood pressures.    4. Lichen Sclerosus Et Atrophicus  Patient is in need of refills today.  - estradioL (ESTRACE) 0.01 % (0.1 mg/gram) vaginal cream; 1 gm vaginally 3 times a week  Dispense: 42.5 g; Refill: 2      5. Hypercalciuria  We will likely have a repeat 24-hour urine calcium when she sees her endocrinologist in August.  She is been off the chlorthalidone.    Subjective     HPI related to upcoming procedure: Mirta presents for preop physical for cataract surgery.  She is new to me today and is also establishing care.  She is otherwise fairly healthy.  She has osteoporosis for which she gets Prolia injections.  She had been on chlorthalidone for hypercalciuria but this was stopped due to some lower blood pressures.  She uses estrogen vaginal cream and clobetasol for lichen sclerosus.  She states she is  up-to-date on her preventative cares.  She has no known coronary artery disease and denies chest pain or shortness of breath.  She said no recent illnesses or exposures.    Preop Questions 5/19/2021   Have you ever had a heart attack or stroke? No   Have you ever had surgery on your heart or blood vessels, such as a stent placement, a coronary artery bypass, or surgery on an artery in your head, neck, heart, or legs? No   Do you have chest pain with activity? No   Do you have a history of  heart failure? No   Do you currently have a cold, bronchitis or symptoms of other infection? No   Do you have a cough, shortness of breath, or wheezing? No   Do you or anyone in your family have previous history of blood clots? No   Do you or does anyone in your family have a serious bleeding problem such as prolonged bleeding following surgeries or cuts? No   Have you ever had problems with anemia or been told to take iron pills? No   Have you had any abnormal blood loss such as black, tarry or bloody stools, or abnormal vaginal bleeding? No   Have you ever had a blood transfusion? No   Are you willing to have a blood transfusion if it is medically needed before, during, or after your surgery? Yes   Have you or any of your relatives ever had problems with anesthesia? No   Do you have sleep apnea, excessive snoring or daytime drowsiness? No   Do you have any artifical heart valves or other implanted medical devices like a pacemaker, defibrillator, or continuous glucose monitor? No   Do you have artificial joints? YES - hip   Are you allergic to latex? No     Health Care Directive:  Patient does not have a Health Care Directive or Living Will: Patient states has Advance Directive and will bring in a copy to clinic.    Preoperative Review of :    reviewed - no record of controlled substances prescribed.    See problem list for active medical problems.  Problems all longstanding and stable, except as noted/documented.  See ROS  for pertinent symptoms related to these conditions.      Review of Systems  CONSTITUTIONAL: NEGATIVE for fever, chills, change in weight  INTEGUMENTARY/SKIN: NEGATIVE for worrisome rashes, moles or lesions  EYES: NEGATIVE for vision changes or irritation  ENT/MOUTH: NEGATIVE for ear, mouth and throat problems  RESP: NEGATIVE for significant cough or SOB  CV: NEGATIVE for chest pain, palpitations or peripheral edema  GI: NEGATIVE for nausea, abdominal pain, heartburn, or change in bowel habits  : NEGATIVE for frequency, dysuria, or hematuria  MUSCULOSKELETAL: NEGATIVE for significant arthralgias or myalgia  NEURO: NEGATIVE for weakness, dizziness or paresthesias  ENDOCRINE: NEGATIVE for temperature intolerance, skin/hair changes  HEME: NEGATIVE for bleeding problems  PSYCHIATRIC: NEGATIVE for changes in mood or affect    Patient Active Problem List    Diagnosis Date Noted     Osteoporosis 11/10/2016     Lumbar herniated disc 04/16/2015     Benign Polyps Of The Large Intestine      Hypercalciuria      Lichen Sclerosus Et Atrophicus      Past Medical History:   Diagnosis Date     Colon cancer (H) 2005    benign polyp removed     Urinary tract infection      Past Surgical History:   Procedure Laterality Date     MT EXCISION BRACH CLFT CYST,SUPERFICIAL      Description: Surgery Excis Of Branchial Cleft Cyst Confined Skin Subcut T;  Recorded: 08/18/2008;     Current Outpatient Medications   Medication Sig Dispense Refill     ascorbic acid, vitamin C, (VITAMIN C) 250 MG tablet Take 250 mg by mouth daily.       CALCIUM CARBONATE (CALCIUM 500 ORAL) Take by mouth.       chlorthalidone (HYGROTEN) 25 MG tablet One tab every other day 90 tablet 13     clobetasol (TEMOVATE) 0.05 % cream APPLY SPARINGLY TO AFFECTED AREA(S) TWICE DAILY 15 g 1     cranberry extract 300 mg Tab Take by mouth.       estradioL (ESTRACE) 0.01 % (0.1 mg/gram) vaginal cream 1 gm vaginally 3 times a week 42.5 g 2     Current Facility-Administered  "Medications   Medication Dose Route Frequency Provider Last Rate Last Admin     denosumab 60 mg (PROLIA 60 mg/ml)  60 mg Subcutaneous Q6 Months Papi Bedolla MD   60 mg at 08/24/20 1139       Allergies   Allergen Reactions     Tramadol Nausea Only       Social History     Tobacco Use     Smoking status: Never Smoker     Smokeless tobacco: Never Used   Substance Use Topics     Alcohol use: Not on file        Social History     Substance and Sexual Activity   Drug Use Not on file        Objective     /73   Pulse 73   Resp 16   Ht 5' 9.9\" (1.775 m)   Wt 114 lb 12.8 oz (52.1 kg)   LMP 02/04/1995   BMI 16.52 kg/m    Physical Exam    GENERAL APPEARANCE: healthy, alert and no distress     EYES: EOMI, PERRL     HENT: ear canals and TM's normal and nose and mouth without ulcers or lesions     NECK: no adenopathy, no asymmetry, masses, or scars and thyroid normal to palpation     RESP: lungs clear to auscultation - no rales, rhonchi or wheezes     CV: regular rates and rhythm, normal S1 S2, no S3 or S4 and no murmur, click or rub     ABDOMEN:  soft, nontender, no HSM or masses and bowel sounds normal     MS: extremities normal- no gross deformities noted, no evidence of inflammation in joints, FROM in all extremities.     SKIN: no suspicious lesions or rashes     NEURO: Normal strength and tone, sensory exam grossly normal, mentation intact and speech normal     PSYCH: mentation appears normal. and affect normal/bright     LYMPHATICS: No cervical adenopathy    Recent Labs   Lab Test 07/29/20  1640   HGB 13.5      K 4.2   CREATININE 0.71        PRE-OP Diagnostics:   No labs were ordered during this visit.  No EKG required for low risk surgery (cataract, skin procedure, breast biopsy, etc).    REVISED CARDIAC RISK INDEX (RCRI)   The patient has the following serious cardiovascular risks for perioperative complications:   - No serious cardiac risks = 0 points    RCRI INTERPRETATION: 0 points: Class I (very " low risk - 0.4% complication rate)         Signed Electronically by: Fernanda Haas MD    Copy of this evaluation report is provided to requesting physician.

## 2021-06-19 NOTE — LETTER
Letter by Domi Bui PA-C at      Author: Domi Bui PA-C Service: -- Author Type: --    Filed:  Encounter Date: 5/9/2019 Status: (Other)         Mirta Faria  [address]  [facility] [address]             May 9, 2019         Dear Ms. Faria,    Below are the results from your recent visit:    Resulted Orders   Urinalysis-UC if Indicated   Result Value Ref Range    Color, UA Yellow Colorless, Yellow, Straw, Light Yellow    Clarity, UA Clear Clear    Glucose, UA Negative Negative    Bilirubin, UA Negative Negative    Ketones, UA Negative Negative    Specific Gravity, UA 1.015 1.005 - 1.030    Blood, UA Trace (!) Negative    pH, UA 7.0 5.0 - 8.0    Protein, UA Negative Negative mg/dL    Urobilinogen, UA 0.2 E.U./dL 0.2 E.U./dL, 1.0 E.U./dL    Nitrite, UA Positive (!) Negative    Leukocytes, UA Moderate (!) Negative    Bacteria, UA Many (!) None Seen hpf    RBC, UA 0-2 None Seen, 0-2 hpf    WBC, UA >100 (!) None Seen, 0-5 hpf    Squam Epithel, UA 0-5 None Seen, 0-5 lpf    Narrative    Urine Culture ordered based on [facility] criteria   Culture, Urine   Result Value Ref Range    Culture >100,000 col/ml Escherichia coli (!)              Culture shows still E. Coli and still sensitive to Nitrofurantoin    Please call with questions or contact us using [employer].    Sincerely,        Electronically signed by Domi Bui PA-C

## 2021-06-19 NOTE — PROGRESS NOTES
Reviewed clinic notes.  She has not been seen by Dr. Bedolla since December 2016.  She is continued on Prolia, and following up with her PCP.  During that visit Dr. Bedolla had requested that she have a repeat bone density in one year in follow-up in clinic, her most recent bone scan was in December 2016.  Will discuss with Dr. Bedolla if is appropriate for her to get a follow-up bone scan after this next Prolia shot, and schedule follow-up appointment with him as it has now been 1.5 years since she has been in consultation.

## 2021-06-20 NOTE — LETTER
Letter by Papi Bedolla MD at      Author: Papi Bedolla MD Service: -- Author Type: --    Filed:  Encounter Date: 9/10/2020 Status: (Other)         Mirta Faria  [address]  [facility] [address]             September 10, 2020         Dear Ms. Faria,    Below are the results from your recent visit:    Resulted Orders   Calcium, 24 Hour Urine   Result Value Ref Range    Calcium, 24H Urine 269 20 - 275 mg/24hr      Comment:      Hypercalciuria >350  Values are for persons with  average daily calcium intake  (600-800 mg/day)    24H Urine Volume 1,700 mL    Calcium, Urine 15.8 mg/dL       Mirta: 24-hour urine calcium is normal in the normal range.    Please call with questions or contact us using [employer].    Sincerely,        Electronically signed by Papi Bedolla MD

## 2021-06-20 NOTE — LETTER
Letter by Papi Bedolla MD at      Author: Papi Bedolla MD Service: -- Author Type: --    Filed:  Encounter Date: 8/25/2020 Status: (Other)         Mirta Faria  [address]  [facility] [address]             August 25, 2020         Dear Ms. Faria,    Below are the results from your recent visit:    Resulted Orders   Vitamin D, Total (25-Hydroxy)   Result Value Ref Range    Vitamin D, Total (25-Hydroxy) 30.0 30.0 - 80.0 ng/mL    Narrative    Deficiency <10.0 ng/mL  Insufficiency 10.0-29.9 ng/mL  Sufficiency 30.0-80.0 ng/mL  Toxicity (possible) >100.0 ng/mL       Mirta: Your vitamin D level is now at the low end of desirable at 30.  I would advise taking an additional 1000 IU vitamin D supplement daily.  This may be purchased without a prescription.  It was nice to see you.    Please call with questions or contact us using [employer].    Sincerely,        Electronically signed by Papi Bedolla MD

## 2021-06-21 NOTE — LETTER
Letter by Papi Bedolla MD at      Author: Papi Bedolla MD Service: -- Author Type: --    Filed:  Encounter Date: 11/26/2020 Status: (Other)         Mirta Faria  [address]  [facility] [address]             November 26, 2020         Dear Ms. Faria,    Below are the results from your recent visit:    Resulted Orders   DXA Bone Density Scan    Narrative    11/24/2020      RE: Mirta Faria  YOB: 1950        Dear Papi Bedolla,    Patient Profile:  70 y.o. female, postmenopausal, is here for the follow up bone density   test.   History of fractures - None. Family history of osteoporosis - Yes;    mother.  Family history of hip fracture: None. Smoking history - No.   Osteoporosis treatment past -  No. Osteoporosis treatment current - Yes;    Prolia.  Chronic medical problems - Hip replacement . High risk   medications -  None.      Assessment:    1. The spine bone density L1-L4 with T-score -1.7.  2. Femoral bone density shows left femoral neck T- score -1.0.  3. Trabecular bone score indicates moderate trabecular bone architecture.      70 y.o. female with LOW BONE DENSITY (OSTEOPENIA) and LOW fracture risk,   adjusted for the TBS, with major osteoporotic fracture risk 7.4% and hip   fracture risk 0.9%.     Since the previous bone density dated  November 5, 2018, there has been a     +4.5% change in the bone density of the spine.  Additionally there has   been a +8.9% change in the left total hip.    Recommendations:  Appropriate calcium, vitamin D supplements, along with balance and weight   bearing exercise recommended.  Additionally, the transition from Prolia to   short-term antiresorptive agent can be considered with follow up bone   density scan in 1 to 2 years.      Bone densitometry was performed on your patient using our Jibe   densitometer. The results are summarized and a copy of the actual scans   are included for your review. In conformity with the  "International Society   of Clinical Densitometry's most recent position statement for DXA   interpretation (2015), the diagnosis will be made on the lowest measured   T-score of the lumbar spine, femoral neck, total proximal femur or 33%   radius. Note the change in terminology for diagnostic classification from   OSTEOPENIA to LOW BONE MASS. All trending for sequential exams will be   done using multiple vertebrae or the total proximal femur. Fracture risk   is based on the WHO Fracture Risk Assessment Tool (FRAX). If additional   information is needed or if you would like to discuss the results, please   do not hesitate to call me.       Thank you for referring this patient to [facility].   We are happy to be of service in support of you and your practice. If you   have any questions or suggestions to improve our service, please call me   at [phone].     Sincerely,     Claudia Ayala M.D. C.C.FEDERICO.  [facility], [facility]       Mirta: Bone density changes reflect an excellent response to current management.  I would advise stopping Prolia after your next injection.  I then would recommend 1 IV infusion of Reclast and beginning a \"drug-free holiday \"away from antiresorptive therapy.  Bone density should be rechecked again in 2 years.    Please call with questions or contact us using [employer].    Sincerely,        Electronically signed by Papi Bedolla MD       "

## 2021-06-27 NOTE — PROGRESS NOTES
Progress Notes by Peg Vargas CNP at 12/22/2018  9:10 AM     Author: Peg Vargas CNP Service: -- Author Type: Nurse Practitioner    Filed: 12/22/2018 11:53 AM Encounter Date: 12/22/2018 Status: Signed    : Peg Vargas CNP (Nurse Practitioner)       Chief Complaint   Patient presents with   ? Dysuria     started 1-2 days ago       ASSESSMENT & PLAN:   Diagnoses and all orders for this visit:    Acute cystitis without hematuria  -     cephalexin 500 mg tablet  Dispense: 14 tablet; Refill: 0    Dysuria  -     Cancel: Urinalysis-UC if Indicated  -     Urine,Microscopic  -     Culture, Urine        MDM:  Recent treatment failure with nitrofurantoin.  Will try cephalexin twice a day for 7 days.  Available culture from the end of October does not show resistance to cephalexin.      Supportive care discussed.  See discharge instructions below for specific recommendations given.    At the end of the encounter, I discussed results, diagnosis, medications. Discussed red flags for immediate return to clinic/ER, as well as indications for follow up if no improvement. Patient and/or caregiver understood and agreed to plan. Patient was stable for discharge.    SUBJECTIVE    HPI:  Frequent UTIs.  Just finished 5days of nitrofurantoin for same  Prescribed in [place]. Lives in [place], [place] half the year - they don't use Epic/Care Everywhere.  Dysuria x 24 hours.      Says doesn't get yeast infections with UTIs normally.  Took an Azo 6 hours ago, so urine is orange.      Previous UTI documented [place] at the end of October shows multidrug resistance.              History obtained from the patient.    Past Medical History:   Diagnosis Date   ? Colon cancer (H) 2005    benign polyp removed   ? Urinary tract infection        Problem List:  2016-11: Osteoporosis  2015-04: Lumbar herniated disc  Hematuria  Pain During Urination (Dysuria)  Osteopenia  Benign Polyps Of The Large Intestine  Hypercalciuria  Lichen Sclerosus  Et Atrophicus  Diarrhea  Acute Cystitis      Social History     Tobacco Use   ? Smoking status: Never Smoker   ? Smokeless tobacco: Never Used   Substance Use Topics   ? Alcohol use: Not on file       Review of Systems   Constitutional: Positive for chills. Negative for fever.   Gastrointestinal: Positive for abdominal pain (cramping ).   Genitourinary: Positive for dysuria.   Musculoskeletal: Negative for back pain.   Skin: Negative for rash.       OBJECTIVE    Vitals:    12/22/18 0914   BP: 114/76   Patient Site: Left Arm   Patient Position: Sitting   Cuff Size: Adult Small   Pulse: 75   Resp: 14   Temp: 97.4  F (36.3  C)   TempSrc: Oral   SpO2: 100%   Weight: 114 lb (51.7 kg)       Physical Exam   Constitutional: She is oriented to person, place, and time. She appears well-developed and well-nourished. No distress.   HENT:   Right Ear: External ear normal.   Left Ear: External ear normal.   Eyes: Conjunctivae are normal. Right eye exhibits no discharge. Left eye exhibits no discharge.   Cardiovascular: Intact distal pulses.   Pulmonary/Chest: Effort normal.   Abdominal: There is no CVA tenderness.   Musculoskeletal: Normal range of motion.   Neurological: She is alert and oriented to person, place, and time.   Skin: Skin is warm and dry. Capillary refill takes less than 2 seconds.   Psychiatric: She has a normal mood and affect. Her behavior is normal. Judgment and thought content normal.         Labs:  Recent Results (from the past 240 hour(s))   Urine,Microscopic   Result Value Ref Range    Bacteria, UA Moderate (!) None Seen hpf    RBC, UA 5-10 (!) None Seen, 0-2 hpf    WBC, UA >100 (!) None Seen, 0-5 hpf    Squam Epithel, UA 0-5 None Seen, 0-5 lpf         Radiology:    No results found.    PATIENT INSTRUCTIONS:   Patient Instructions   Cephalexin twice daily for 7 days.    We will call you in approximately 2 days if the bacteria that is found with this infection is resistant to cephalexin.      If you develop  "a high fever or severe back pain, I would go to the hospital for further treatment.    Azo is okay up to 3 times a day for a total of 2 days.    Drink a lot of water.    Patient Education     Bladder Infection, Female (Adult)    Urine is normally doesn't have any bacteria in it. But bacteria can get into the urinary tract from the skin around the rectum. Or they can travel in the blood from elsewhere in the body. Once they are in your urinary tract, they can cause infection in the urethra (urethritis), the bladder (cystitis), or the kidneys (pyelonephritis).  The most common place for an infection is in the bladder. This is called a bladder infection. This is one of the most common infections in women. Most bladder infections are easily treated. They are not serious unless the infection spreads to the kidney.  The phrases \"bladder infection,\" \"UTI,\" and \"cystitis\" are often used to describe the same thing. But they are not always the same. Cystitis is an inflammation of the bladder. The most common cause of cystitis is an infection.  Symptoms  The infection causes inflammation in the urethra and bladder. This causes many of the symptoms. The most common symptoms of a bladder infection are:    Pain or burning when urinating    Having to urinate more often than usual    Urgent need to urinate    Only a small amount of urine comes out    Blood in urine    Abdominal discomfort. This is usually in the lower abdomen above the pubic bone.    Cloudy urine    Strong- or bad-smelling urine    Unable to urinate (urinary retention)    Unable to hold urine in (urinary incontinence)    Fever    Loss of appetite    Confusion (in older adults)  Causes  Bladder infections are not contagious. You can't get one from someone else, from a toilet seat, or from sharing a bath.  The most common cause of bladder infections is bacteria from the bowels. The bacteria get onto the skin around the opening of the urethra. From there, they can get " into the urine and travel up to the bladder, causing inflammation and infection. This usually happens because of:    Wiping improperly after urinating. Always wipe from front to back.    Bowel incontinence    Pregnancy    Procedures such as having a catheter inserted    Older age    Not emptying your bladder. This can allow bacteria a chance to grow in your urine.    Dehydration    Constipation    Sex    Use of a diaphragm for birth control   Treatment  Bladder infections are diagnosed by a urine test. They are treated with antibiotics and usually clear up quickly without complications. Treatment helps prevent a more serious kidney infection.  Medicines  Medicines can help in the treatment of a bladder infection:    Take antibiotics until they are used up, even if you feel better. It is important to finish them to make sure the infection has cleared.    You can use acetaminophen or ibuprofen for pain, fever, or discomfort, unless another medicine was prescribed. If you have chronic liver or kidney disease, talk with your healthcare provider before using these medicines. Also talk with your provider if you've ever had a stomach ulcer or gastrointestinal bleeding, or are taking blood-thinner medicines.    If you are given phenazopydridine to reduce burning with urination, it will cause your urine to become a bright orange color. This can stain clothing.  Care and prevention  These self-care steps can help prevent future infections:    Drink plenty of fluids to prevent dehydration and flush out your bladder. Do this unless you must restrict fluids for other health reasons, or your doctor told you not to.    Proper cleaning after going to the bathroom is important. Wipe from front to back after using the toilet to prevent the spread of bacteria.    Urinate more often. Don't try to hold urine in for a long time.    Wear loose-fitting clothes and cotton underwear. Avoid tight-fitting pants.    Improve your diet and  prevent constipation. Eat more fresh fruit and vegetables, and fiber, and less junk and fatty foods.    Avoid sex until your symptoms are gone.    Avoid caffeine, alcohol, and spicy foods. These can irritate your bladder.    Urinate right after intercourse to flush out your bladder.    If you use birth control pills and have frequent bladder infections, discuss it with your doctor.  Follow-up care  Call your healthcare provider if all symptoms are not gone after 3 days of treatment. This is especially important if you have repeat infections.  If a culture was done, you will be told if your treatment needs to be changed. If directed, you can call to find out the results.  If X-rays were done, you will be told if the results will affect your treatment.  Call 911  Call 911 if any of the following occur:    Trouble breathing    Hard to wake up or confusion    Fainting or loss of consciousness    Rapid heart rate  When to seek medical advice  Call your healthcare provider right away if any of these occur:    Fever of 100.4 F (38.0 C) or higher, or as directed by your healthcare provider    Symptoms are not better by the third day of treatment    Back or belly (abdominal) pain that gets worse    Repeated vomiting, or unable to keep medicine down    Weakness or dizziness    Vaginal discharge    Pain, redness, or swelling in the outer vaginal area (labia)  Date Last Reviewed: 10/1/2016    4328-2662 [employer]. [address] [place] [address], [place] [id number]. All rights reserved. This information is not intended as a substitute for professional medical care. Always follow your healthcare professional's instructions.           Patient Education     Cephalexin tablets or capsules  Brand Names: Biocef, Daxbia, Keflex  What is this medicine?  CEPHALEXIN (sef a ZOË in) is a cephalosporin antibiotic. It is used to treat certain kinds of bacterial infections It will not work for colds, flu, or other viral infections.  How  should I use this medicine?  Take this medicine by mouth with a full glass of water. Follow the directions on the prescription label. This medicine can be taken with or without food. Take your medicine at regular intervals. Do not take your medicine more often than directed. Take all of your medicine as directed even if you think you are better. Do not skip doses or stop your medicine early.  Talk to your pediatrician regarding the use of this medicine in children. While this drug may be prescribed for selected conditions, precautions do apply.  What side effects may I notice from receiving this medicine?  Side effects that you should report to your doctor or health care professional as soon as possible:    allergic reactions like skin rash, itching or hives, swelling of the face, lips, or tongue    breathing problems    pain or trouble passing urine    redness, blistering, peeling or loosening of the skin, including inside the mouth    severe or watery diarrhea    unusually weak or tired    yellowing of the eyes, skin  Side effects that usually do not require medical attention (report to your doctor or health care professional if they continue or are bothersome):    gas or heartburn    genital or anal irritation    headache    joint or muscle pain    nausea, vomiting  What may interact with this medicine?    probenecid    some other antibiotics    What if I miss a dose?  If you miss a dose, take it as soon as you can. If it is almost time for your next dose, take only that dose. Do not take double or extra doses. There should be at least 4 to 6 hours between doses.  Where should I keep my medicine?  Keep out of the reach of children.  Store at room temperature between 59 and 86 degrees F (15 and 30 degrees C). Throw away any unused medicine after the expiration date.  What should I tell my health care provider before I take this medicine?  They need to know if you have any of these conditions:    kidney  disease    stomach or intestine problems, especially colitis    an unusual or allergic reaction to cephalexin, other cephalosporins, penicillins, other antibiotics, medicines, foods, dyes or preservatives    pregnant or trying to get pregnant    breast-feeding  What should I watch for while using this medicine?  Tell your doctor or health care professional if your symptoms do not begin to improve in a few days.  Do not treat diarrhea with over the counter products. Contact your doctor if you have diarrhea that lasts more than 2 days or if it is severe and watery.  If you have diabetes, you may get a false-positive result for sugar in your urine. Check with your doctor or health care professional.  NOTE:This sheet is a summary. It may not cover all possible information. If you have questions about this medicine, talk to your doctor, pharmacist, or health care provider. Copyright  2018 [employer]

## 2021-07-03 NOTE — ADDENDUM NOTE
Addendum Note by Opal Mccoy CMA at 12/4/2018  3:45 PM     Author: Opal Mccoy CMA Service: -- Author Type: Certified Medical Assistant    Filed: 12/4/2018  3:45 PM Encounter Date: 11/23/2018 Status: Signed    : Opal Mccoy CMA (Certified Medical Assistant)    Addended by: OPAL MCCOY on: 12/4/2018 03:45 PM        Modules accepted: Orders

## 2021-07-03 NOTE — ADDENDUM NOTE
Addendum Note by Noel Potts MD at 12/5/2018  4:11 PM     Author: Noel Potts MD Service: -- Author Type: Physician    Filed: 12/5/2018  4:11 PM Encounter Date: 11/23/2018 Status: Signed    : Noel Potts MD (Physician)    Addended by: NOEL POTTS on: 12/5/2018 04:11 PM        Modules accepted: Orders

## 2021-07-13 ENCOUNTER — RECORDS - HEALTHEAST (OUTPATIENT)
Dept: ADMINISTRATIVE | Facility: CLINIC | Age: 71
End: 2021-07-13

## 2021-07-14 ENCOUNTER — TELEPHONE (OUTPATIENT)
Dept: INTERNAL MEDICINE | Facility: CLINIC | Age: 71
End: 2021-07-14

## 2021-07-14 NOTE — TELEPHONE ENCOUNTER
Patient called. She received her last Prolia on 02.26.2021 in [place]. Per letter she received from Dr. Bedolla she is to have a reclast after her last prolia injection and go on a drug holiday afterwards.    Please call her to further discuss @ [phone]

## 2021-07-15 NOTE — TELEPHONE ENCOUNTER
PCP please review message below and advise. Please place order for reclast if appropriate or if help is needed to enter in Reclast please indicate, thank you.

## 2021-07-16 DIAGNOSIS — M81.0 AGE-RELATED OSTEOPOROSIS WITHOUT CURRENT PATHOLOGICAL FRACTURE: Primary | ICD-10-CM

## 2021-07-16 DIAGNOSIS — Z20.822 ENCOUNTER FOR LABORATORY TESTING FOR COVID-19 VIRUS: ICD-10-CM

## 2021-07-16 RX ORDER — ALBUTEROL SULFATE 90 UG/1
1-2 AEROSOL, METERED RESPIRATORY (INHALATION)
Status: CANCELLED
Start: 2021-07-16

## 2021-07-16 RX ORDER — MEPERIDINE HYDROCHLORIDE 25 MG/ML
25 INJECTION INTRAMUSCULAR; INTRAVENOUS; SUBCUTANEOUS EVERY 30 MIN PRN
Status: CANCELLED | OUTPATIENT
Start: 2021-07-16

## 2021-07-16 RX ORDER — DIPHENHYDRAMINE HYDROCHLORIDE 50 MG/ML
50 INJECTION INTRAMUSCULAR; INTRAVENOUS
Status: CANCELLED
Start: 2021-07-16

## 2021-07-16 RX ORDER — METHYLPREDNISOLONE SODIUM SUCCINATE 125 MG/2ML
125 INJECTION, POWDER, LYOPHILIZED, FOR SOLUTION INTRAMUSCULAR; INTRAVENOUS
Status: CANCELLED
Start: 2021-07-16

## 2021-07-16 RX ORDER — EPINEPHRINE 1 MG/ML
0.3 INJECTION, SOLUTION, CONCENTRATE INTRAVENOUS EVERY 5 MIN PRN
Status: CANCELLED | OUTPATIENT
Start: 2021-07-16

## 2021-07-16 RX ORDER — NALOXONE HYDROCHLORIDE 0.4 MG/ML
0.2 INJECTION, SOLUTION INTRAMUSCULAR; INTRAVENOUS; SUBCUTANEOUS
Status: CANCELLED | OUTPATIENT
Start: 2021-07-16

## 2021-07-16 RX ORDER — ZOLEDRONIC ACID 5 MG/100ML
5 INJECTION, SOLUTION INTRAVENOUS ONCE
Status: CANCELLED
Start: 2021-07-16 | End: 2021-07-16

## 2021-07-16 RX ORDER — HEPARIN SODIUM,PORCINE 10 UNIT/ML
5 VIAL (ML) INTRAVENOUS
Status: CANCELLED | OUTPATIENT
Start: 2021-07-16

## 2021-07-16 RX ORDER — ALBUTEROL SULFATE 0.83 MG/ML
2.5 SOLUTION RESPIRATORY (INHALATION)
Status: CANCELLED | OUTPATIENT
Start: 2021-07-16

## 2021-07-16 RX ORDER — HEPARIN SODIUM (PORCINE) LOCK FLUSH IV SOLN 100 UNIT/ML 100 UNIT/ML
5 SOLUTION INTRAVENOUS
Status: CANCELLED | OUTPATIENT
Start: 2021-07-16

## 2021-07-21 ENCOUNTER — RECORDS - HEALTHEAST (OUTPATIENT)
Dept: ADMINISTRATIVE | Facility: CLINIC | Age: 71
End: 2021-07-21

## 2021-07-21 NOTE — TELEPHONE ENCOUNTER
Once a therapy plan has been entered, the infusion center team is trying to reach out and help schedule patients within 48 hours.  This has been slightly longer due to the merge. The infusion team will schedule at the location of the patient's request when they reach out to them.  I did speak with one of the team members, and they do see her order and will reach out to schedule.

## 2021-07-23 ENCOUNTER — RECORDS - HEALTHEAST (OUTPATIENT)
Dept: ADMINISTRATIVE | Facility: CLINIC | Age: 71
End: 2021-07-23

## 2021-07-30 ENCOUNTER — TRANSCRIBE ORDERS (OUTPATIENT)
Dept: INTERNAL MEDICINE | Facility: CLINIC | Age: 71
End: 2021-07-30

## 2021-07-30 ENCOUNTER — TELEPHONE (OUTPATIENT)
Dept: FAMILY MEDICINE | Facility: CLINIC | Age: 71
End: 2021-07-30

## 2021-07-30 DIAGNOSIS — Z92.29 PERSONAL HISTORY OF DRUG THERAPY: ICD-10-CM

## 2021-07-30 DIAGNOSIS — M81.0 OSTEOPOROSIS: Primary | ICD-10-CM

## 2021-07-30 NOTE — TELEPHONE ENCOUNTER
Patient would like to know if Dr. Haas would be able to work patient in for another pre op. Patient missed pre op due to patient error. If Dr Haas cannot work patient in would be ok to see another provider. Please call to schedule. [facility]

## 2021-07-30 NOTE — PROGRESS NOTES
As part of the required manual data conversion process for integration, this encounter was created to document a CAM (Clinic Administered Medication) order. This information was copied from the [facility] patient's chart to the [facility] patient chart.     Ya Gunderson PharmD  July 30, 2021

## 2021-08-03 ENCOUNTER — INFUSION THERAPY VISIT (OUTPATIENT)
Dept: INFUSION THERAPY | Facility: HOSPITAL | Age: 71
End: 2021-08-03
Attending: INTERNAL MEDICINE
Payer: MEDICARE

## 2021-08-03 VITALS
HEART RATE: 59 BPM | TEMPERATURE: 98.6 F | DIASTOLIC BLOOD PRESSURE: 74 MMHG | BODY MASS INDEX: 16.29 KG/M2 | SYSTOLIC BLOOD PRESSURE: 121 MMHG | HEIGHT: 69 IN | RESPIRATION RATE: 16 BRPM | OXYGEN SATURATION: 97 % | WEIGHT: 110 LBS

## 2021-08-03 DIAGNOSIS — M81.0 AGE-RELATED OSTEOPOROSIS WITHOUT CURRENT PATHOLOGICAL FRACTURE: Primary | ICD-10-CM

## 2021-08-03 LAB
ALBUMIN SERPL-MCNC: 3.9 G/DL (ref 3.5–5)
CALCIUM SERPL-MCNC: 9.2 MG/DL (ref 8.5–10.5)
CREAT SERPL-MCNC: 0.73 MG/DL (ref 0.6–1.1)
GFR SERPL CREATININE-BSD FRML MDRD: 84 ML/MIN/1.73M2

## 2021-08-03 PROCEDURE — 96374 THER/PROPH/DIAG INJ IV PUSH: CPT

## 2021-08-03 PROCEDURE — 82310 ASSAY OF CALCIUM: CPT | Performed by: INTERNAL MEDICINE

## 2021-08-03 PROCEDURE — 82565 ASSAY OF CREATININE: CPT | Performed by: INTERNAL MEDICINE

## 2021-08-03 PROCEDURE — 82040 ASSAY OF SERUM ALBUMIN: CPT | Performed by: INTERNAL MEDICINE

## 2021-08-03 PROCEDURE — 250N000011 HC RX IP 250 OP 636: Performed by: INTERNAL MEDICINE

## 2021-08-03 PROCEDURE — 36415 COLL VENOUS BLD VENIPUNCTURE: CPT | Performed by: INTERNAL MEDICINE

## 2021-08-03 PROCEDURE — 258N000003 HC RX IP 258 OP 636: Performed by: INTERNAL MEDICINE

## 2021-08-03 RX ORDER — ZOLEDRONIC ACID 5 MG/100ML
5 INJECTION, SOLUTION INTRAVENOUS ONCE
Status: COMPLETED | OUTPATIENT
Start: 2021-08-03 | End: 2021-08-03

## 2021-08-03 RX ORDER — NALOXONE HYDROCHLORIDE 0.4 MG/ML
0.2 INJECTION, SOLUTION INTRAMUSCULAR; INTRAVENOUS; SUBCUTANEOUS
Status: DISCONTINUED | OUTPATIENT
Start: 2021-08-03 | End: 2021-08-03 | Stop reason: HOSPADM

## 2021-08-03 RX ORDER — METHYLPREDNISOLONE SODIUM SUCCINATE 125 MG/2ML
125 INJECTION, POWDER, LYOPHILIZED, FOR SOLUTION INTRAMUSCULAR; INTRAVENOUS
Status: DISCONTINUED | OUTPATIENT
Start: 2021-08-03 | End: 2021-08-03 | Stop reason: HOSPADM

## 2021-08-03 RX ORDER — MEPERIDINE HYDROCHLORIDE 25 MG/ML
25 INJECTION INTRAMUSCULAR; INTRAVENOUS; SUBCUTANEOUS EVERY 30 MIN PRN
Status: DISCONTINUED | OUTPATIENT
Start: 2021-08-03 | End: 2021-08-03 | Stop reason: HOSPADM

## 2021-08-03 RX ORDER — METHYLPREDNISOLONE SODIUM SUCCINATE 125 MG/2ML
125 INJECTION, POWDER, LYOPHILIZED, FOR SOLUTION INTRAMUSCULAR; INTRAVENOUS
Status: CANCELLED
Start: 2021-08-03

## 2021-08-03 RX ORDER — ZOLEDRONIC ACID 5 MG/100ML
5 INJECTION, SOLUTION INTRAVENOUS ONCE
Status: CANCELLED
Start: 2021-08-03 | End: 2021-08-03

## 2021-08-03 RX ORDER — ALBUTEROL SULFATE 90 UG/1
1-2 AEROSOL, METERED RESPIRATORY (INHALATION)
Status: DISCONTINUED | OUTPATIENT
Start: 2021-08-03 | End: 2021-08-03 | Stop reason: HOSPADM

## 2021-08-03 RX ORDER — HEPARIN SODIUM (PORCINE) LOCK FLUSH IV SOLN 100 UNIT/ML 100 UNIT/ML
5 SOLUTION INTRAVENOUS
Status: CANCELLED | OUTPATIENT
Start: 2021-08-03

## 2021-08-03 RX ORDER — ALBUTEROL SULFATE 90 UG/1
1-2 AEROSOL, METERED RESPIRATORY (INHALATION)
Status: CANCELLED
Start: 2021-08-03

## 2021-08-03 RX ORDER — ALBUTEROL SULFATE 0.83 MG/ML
2.5 SOLUTION RESPIRATORY (INHALATION)
Status: CANCELLED | OUTPATIENT
Start: 2021-08-03

## 2021-08-03 RX ORDER — DIPHENHYDRAMINE HYDROCHLORIDE 50 MG/ML
50 INJECTION INTRAMUSCULAR; INTRAVENOUS
Status: CANCELLED
Start: 2021-08-03

## 2021-08-03 RX ORDER — ALBUTEROL SULFATE 0.83 MG/ML
2.5 SOLUTION RESPIRATORY (INHALATION)
Status: DISCONTINUED | OUTPATIENT
Start: 2021-08-03 | End: 2021-08-03 | Stop reason: HOSPADM

## 2021-08-03 RX ORDER — NALOXONE HYDROCHLORIDE 0.4 MG/ML
0.2 INJECTION, SOLUTION INTRAMUSCULAR; INTRAVENOUS; SUBCUTANEOUS
Status: CANCELLED | OUTPATIENT
Start: 2021-08-03

## 2021-08-03 RX ORDER — DIPHENHYDRAMINE HYDROCHLORIDE 50 MG/ML
50 INJECTION INTRAMUSCULAR; INTRAVENOUS
Status: DISCONTINUED | OUTPATIENT
Start: 2021-08-03 | End: 2021-08-03 | Stop reason: HOSPADM

## 2021-08-03 RX ORDER — EPINEPHRINE 1 MG/ML
0.3 INJECTION, SOLUTION INTRAMUSCULAR; SUBCUTANEOUS EVERY 5 MIN PRN
Status: CANCELLED | OUTPATIENT
Start: 2021-08-03

## 2021-08-03 RX ORDER — HEPARIN SODIUM,PORCINE 10 UNIT/ML
5 VIAL (ML) INTRAVENOUS
Status: CANCELLED | OUTPATIENT
Start: 2021-08-03

## 2021-08-03 RX ORDER — EPINEPHRINE 1 MG/ML
0.3 INJECTION, SOLUTION INTRAMUSCULAR; SUBCUTANEOUS EVERY 5 MIN PRN
Status: DISCONTINUED | OUTPATIENT
Start: 2021-08-03 | End: 2021-08-03 | Stop reason: HOSPADM

## 2021-08-03 RX ORDER — MEPERIDINE HYDROCHLORIDE 25 MG/ML
25 INJECTION INTRAMUSCULAR; INTRAVENOUS; SUBCUTANEOUS EVERY 30 MIN PRN
Status: CANCELLED | OUTPATIENT
Start: 2021-08-03

## 2021-08-03 RX ADMIN — ZOLEDRONIC ACID 5 MG: 5 INJECTION, SOLUTION INTRAVENOUS at 15:23

## 2021-08-03 RX ADMIN — SODIUM CHLORIDE 250 ML: 9 INJECTION, SOLUTION INTRAVENOUS at 14:15

## 2021-08-03 ASSESSMENT — MIFFLIN-ST. JEOR: SCORE: 1083.34

## 2021-08-03 NOTE — LETTER
August 3, 2021      Mirta Faria  [address]  [facility] [place] [facility] [address]        Dear ,    We are writing to inform you of your test results.    Mirta: Monitoring labs all look good.    Resulted Orders   Creatinine   Result Value Ref Range    Creatinine 0.73 0.60 - 1.10 mg/dL    GFR Estimate 84 >60 mL/min/1.73m2      Comment:      As of July 11, 2021, eGFR is calculated by the CKD-EPI creatinine equation, without race adjustment. eGFR can be influenced by muscle mass, exercise, and diet. The reported eGFR is an estimation only and is only applicable if the renal function is stable.   Calcium   Result Value Ref Range    Calcium 9.2 8.5 - 10.5 mg/dL   Albumin level   Result Value Ref Range    Albumin 3.9 3.5 - 5.0 g/dL       If you have any questions or concerns, please call the clinic at the number listed above.       Sincerely,      Papi Bedolla MD

## 2021-08-03 NOTE — PROGRESS NOTES
Infusion Nursing Note:  Mirta Faria presents today for zoledronic acid infusion.    Patient seen by provider today: No   present during visit today: Not Applicable.    Note: Patient arrived ambulatory and was seated in chair 9. Reviewed plan of care. Educated patient on zoledronic acid - potential side effects, process of administering, and necessary labs for this medication. Placed IV and labs were drawn as indicated on the therapy plan. A copy of the lab results was given and explained to the patient. Infused zoledronic acid 5 mg over 15 minutes, and flushed the IV line with NS at completion. VSS. No ill effects noted. Post infusion AVS was given and explained to the patient. Left the unit ambulatory and in stable condition at 16:02.    Intravenous Access:  Labs drawn without difficulty.  Peripheral IV placed.    Treatment Conditions:  Results reviewed, labs MET treatment parameters, ok to proceed with treatment.    Post Infusion Assessment:  Patient tolerated infusion without incident.  Blood return noted pre and post infusion.  Site patent and intact, free from redness, edema or discomfort.  No evidence of extravasations.  Access discontinued per protocol - wrapped site with folded 2x2s and coban.    Discharge Plan:   Discharge instructions reviewed with: Patient.  Patient verbalized understanding of discharge instructions and all questions answered.  Copy of AVS reviewed with patient Instructed to call with any questions or concerns.  Patient discharged in stable condition accompanied by: self.  Departure Mode: Ambulatory.      Mirta Ureña RN

## 2021-08-03 NOTE — TELEPHONE ENCOUNTER
You can use the nextday hold on Thursday if she wants.  Just make sureif she cannot makeit then that she cancels as it is a long appt.

## 2021-08-03 NOTE — PATIENT INSTRUCTIONS
"Dr. Papi Bedolla -- [phone] -- call with questions or concerns.    1st floor OP Infusion at [facility] -- [phone], option 2 for a nurse.    Patient was given and explained the zoledronic acid drug insert from the box.    Reminder:  This medication is typically given in 12 months and a day, and requires 'prior authorization' -- and within 30 days of receiving the infusion a \"kidney function\" test needs to be done -- such as a BMP or CMP or just Ca++ & creatinine.  "

## 2021-08-05 ENCOUNTER — OFFICE VISIT (OUTPATIENT)
Dept: FAMILY MEDICINE | Facility: CLINIC | Age: 71
End: 2021-08-05
Payer: MEDICARE

## 2021-08-05 VITALS
HEART RATE: 81 BPM | HEIGHT: 69 IN | SYSTOLIC BLOOD PRESSURE: 99 MMHG | BODY MASS INDEX: 16.08 KG/M2 | WEIGHT: 108.6 LBS | RESPIRATION RATE: 16 BRPM | DIASTOLIC BLOOD PRESSURE: 62 MMHG

## 2021-08-05 DIAGNOSIS — M16.12 PRIMARY OSTEOARTHRITIS OF LEFT HIP: ICD-10-CM

## 2021-08-05 DIAGNOSIS — Z01.818 PREOP GENERAL PHYSICAL EXAM: Primary | ICD-10-CM

## 2021-08-05 LAB
ALBUMIN UR-MCNC: NEGATIVE MG/DL
ANION GAP SERPL CALCULATED.3IONS-SCNC: 10 MMOL/L (ref 5–18)
APPEARANCE UR: CLEAR
BILIRUB UR QL STRIP: NEGATIVE
BUN SERPL-MCNC: 13 MG/DL (ref 8–28)
CALCIUM SERPL-MCNC: 9.4 MG/DL (ref 8.5–10.5)
CHLORIDE BLD-SCNC: 100 MMOL/L (ref 98–107)
CO2 SERPL-SCNC: 26 MMOL/L (ref 22–31)
COLOR UR AUTO: YELLOW
CREAT SERPL-MCNC: 0.76 MG/DL (ref 0.6–1.1)
ERYTHROCYTE [DISTWIDTH] IN BLOOD BY AUTOMATED COUNT: 13.6 % (ref 10–15)
GFR SERPL CREATININE-BSD FRML MDRD: 80 ML/MIN/1.73M2
GLUCOSE BLD-MCNC: 101 MG/DL (ref 70–125)
GLUCOSE UR STRIP-MCNC: NEGATIVE MG/DL
HCT VFR BLD AUTO: 38.5 % (ref 35–47)
HGB BLD-MCNC: 12.5 G/DL (ref 11.7–15.7)
HGB UR QL STRIP: ABNORMAL
KETONES UR STRIP-MCNC: NEGATIVE MG/DL
LEUKOCYTE ESTERASE UR QL STRIP: ABNORMAL
MCH RBC QN AUTO: 32 PG (ref 26.5–33)
MCHC RBC AUTO-ENTMCNC: 32.5 G/DL (ref 31.5–36.5)
MCV RBC AUTO: 99 FL (ref 78–100)
NITRATE UR QL: NEGATIVE
PH UR STRIP: 6 [PH] (ref 5–8)
PLATELET # BLD AUTO: 131 10E3/UL (ref 150–450)
POTASSIUM BLD-SCNC: 3.6 MMOL/L (ref 3.5–5)
RBC # BLD AUTO: 3.91 10E6/UL (ref 3.8–5.2)
SODIUM SERPL-SCNC: 136 MMOL/L (ref 136–145)
SP GR UR STRIP: 1.01 (ref 1–1.03)
UROBILINOGEN UR STRIP-ACNC: 0.2 E.U./DL
WBC # BLD AUTO: 4.8 10E3/UL (ref 4–11)

## 2021-08-05 PROCEDURE — 93010 ELECTROCARDIOGRAM REPORT: CPT | Performed by: GENERAL ACUTE CARE HOSPITAL

## 2021-08-05 PROCEDURE — 99214 OFFICE O/P EST MOD 30 MIN: CPT | Performed by: FAMILY MEDICINE

## 2021-08-05 PROCEDURE — 93005 ELECTROCARDIOGRAM TRACING: CPT | Performed by: FAMILY MEDICINE

## 2021-08-05 PROCEDURE — 36415 COLL VENOUS BLD VENIPUNCTURE: CPT | Performed by: FAMILY MEDICINE

## 2021-08-05 PROCEDURE — 80048 BASIC METABOLIC PNL TOTAL CA: CPT | Performed by: FAMILY MEDICINE

## 2021-08-05 PROCEDURE — 81003 URINALYSIS AUTO W/O SCOPE: CPT | Performed by: FAMILY MEDICINE

## 2021-08-05 PROCEDURE — 85027 COMPLETE CBC AUTOMATED: CPT | Performed by: FAMILY MEDICINE

## 2021-08-05 ASSESSMENT — MIFFLIN-ST. JEOR: SCORE: 1076.99

## 2021-08-05 NOTE — PATIENT INSTRUCTIONS

## 2021-08-05 NOTE — PROGRESS NOTES
[facility]  [address] [facility] [address]  Phone: [phone]  Fax: [phone]  Primary Provider: Domi Bui  Pre-op Performing Provider: VLADIMIR RODRIGUEZ      PREOPERATIVE EVALUATION:  Today's date: 8/5/2021    Mirta Faria is a 71 year old female who presents for a preoperative evaluation.    Surgical Information:  Surgery/Procedure: Left hip replacement  Surgery Location: [facility]   Surgeon: Dr. DAMEON Cerda  Surgery Date: 8/19/21  Time of Surgery: TBD  Where patient plans to recover: At home with family  Fax number for surgical facility: [phone]    Type of Anesthesia Anticipated: General    1. Preop general physical exam  Mirta is approved for surgery with general anesthesia.  We set up her outpatient Covid test.  Labs are currently pending.  - EKG 12-lead, tracing only  - CBC with platelets; Future  - Basic metabolic panel; Future  - UA reflex to Microscopic - lab collect; Future  - Asymptomatic COVID-19 Virus (Coronavirus) by PCR; Future    2. Primary osteoarthritis of left hip        Subjective     HPI related to upcoming procedure: Mirta presents for preop physical.  She is having a hip replacement.  She had the other hip replaced about 5 years ago.  She states she did well with the anesthesia, surgery, and recovery.  She has osteoporosis and just had a Reclast injection.  She has no known coronary artery disease and denies chest pain or shortness of breath.  She said no recent illnesses or exposures.    Preop Questions 8/5/2021   1. Have you ever had a heart attack or stroke? No   2. Have you ever had surgery on your heart or blood vessels, such as a stent placement, a coronary artery bypass, or surgery on an artery in your head, neck, heart, or legs? No   3. Do you have chest pain with activity? No   4. Do you have a history of  heart failure? No   5. Do you currently have a cold, bronchitis or symptoms of other infection? No   6. Do  you have a cough, shortness of breath, or wheezing? No   7. Do you or anyone in your family have previous history of blood clots? No   8. Do you or does anyone in your family have a serious bleeding problem such as prolonged bleeding following surgeries or cuts? No   9. Have you ever had problems with anemia or been told to take iron pills? No   10. Have you had any abnormal blood loss such as black, tarry or bloody stools, or abnormal vaginal bleeding? No   11. Have you ever had a blood transfusion? No   12. Are you willing to have a blood transfusion if it is medically needed before, during, or after your surgery? Yes   13. Have you or any of your relatives ever had problems with anesthesia? No   14. Do you have sleep apnea, excessive snoring or daytime drowsiness? No   15. Do you have any artifical heart valves or other implanted medical devices like a pacemaker, defibrillator, or continuous glucose monitor? No   16. Do you have artificial joints? YES - previous hip   17. Are you allergic to latex? No       Health Care Directive:  Patient does not have a Health Care Directive or Living Will: Discussed advance care planning with patient; however, patient declined at this time.    Preoperative Review of :   reviewed - no record of controlled substances prescribed.      Status of Chronic Conditions:  See problem list for active medical problems.  Problems all longstanding and stable, except as noted/documented.  See ROS for pertinent symptoms related to these conditions.      Review of Systems  CONSTITUTIONAL: NEGATIVE for fever, chills, change in weight  INTEGUMENTARY/SKIN: NEGATIVE for worrisome rashes, moles or lesions  EYES: NEGATIVE for vision changes or irritation  ENT/MOUTH: NEGATIVE for ear, mouth and throat problems  RESP: NEGATIVE for significant cough or SOB  CV: NEGATIVE for chest pain, palpitations or peripheral edema  GI: NEGATIVE for nausea, abdominal pain, heartburn, or change in bowel  habits  : NEGATIVE for frequency, dysuria, or hematuria  MUSCULOSKELETAL: NEGATIVE for significant arthralgias or myalgia  NEURO: NEGATIVE for weakness, dizziness or paresthesias  ENDOCRINE: NEGATIVE for temperature intolerance, skin/hair changes  HEME: NEGATIVE for bleeding problems  PSYCHIATRIC: NEGATIVE for changes in mood or affect    Patient Active Problem List    Diagnosis Date Noted     Osteoporosis 11/10/2016     Priority: Medium     Hypercalciuria      Priority: Medium     Created by Conversion  Replacement Utility updated for latest IMO load         Lumbar herniated disc 04/16/2015     Priority: Medium     Lichen Sclerosus Et Atrophicus      Priority: Medium     Created by Conversion         Benign Polyps Of The Large Intestine      Priority: Medium     Created by Conversion          Past Medical History:   Diagnosis Date     Colon cancer (H) 2005    benign polyp removed     Urinary tract infection      Past Surgical History:   Procedure Laterality Date     HC EXCISION BRACH CLFT CYST,SUPERFICIAL      Description: Surgery Excis Of Branchial Cleft Cyst Confined Skin Subcut T;  Recorded: 08/18/2008;     Current Outpatient Medications   Medication Sig Dispense Refill     ascorbic acid, vitamin C, (VITAMIN C) 250 MG tablet [ASCORBIC ACID, VITAMIN C, (VITAMIN C) 250 MG TABLET] Take 250 mg by mouth daily.       CALCIUM CARBONATE (CALCIUM 500 ORAL) [CALCIUM CARBONATE (CALCIUM 500 ORAL)] Take by mouth.       clobetasol (TEMOVATE) 0.05 % cream [CLOBETASOL (TEMOVATE) 0.05 % CREAM] APPLY SPARINGLY TO AFFECTED AREA(S) TWICE DAILY 15 g 1     cranberry extract 300 mg Tab [CRANBERRY EXTRACT 300 MG TAB] Take by mouth.       estradioL (ESTRACE) 0.01 % (0.1 mg/gram) vaginal cream [ESTRADIOL (ESTRACE) 0.01 % (0.1 MG/GRAM) VAGINAL CREAM] 1 gm vaginally 3 times a week 42.5 g 2       Allergies   Allergen Reactions     Tramadol Nausea        Social History     Tobacco Use     Smoking status: Never Smoker     Smokeless tobacco:  "Never Used   Substance Use Topics     Alcohol use: Not on file       History   Drug Use Not on file         Objective     BP 99/62   Pulse 81   Resp 16   Ht 1.753 m (5' 9\")   Wt 49.3 kg (108 lb 9.6 oz)   LMP  (LMP Unknown)   BMI 16.04 kg/m      Physical Exam    GENERAL APPEARANCE: healthy, alert and no distress     EYES: EOMI, PERRL     HENT: ear canals and TM's normal and nose and mouth without ulcers or lesions     NECK: no adenopathy, no asymmetry, masses, or scars and thyroid normal to palpation     RESP: lungs clear to auscultation - no rales, rhonchi or wheezes     CV: regular rates and rhythm, normal S1 S2, no S3 or S4 and no murmur, click or rub     ABDOMEN:  soft, nontender, no HSM or masses and bowel sounds normal     MS: extremities normal- no gross deformities noted, no evidence of inflammation in joints, FROM in all extremities.     SKIN: no suspicious lesions or rashes     NEURO: Normal strength and tone, sensory exam grossly normal, mentation intact and speech normal     PSYCH: mentation appears normal. and affect normal/bright     LYMPHATICS: No cervical adenopathy    Recent Labs   Lab Test 08/03/21  1412 07/29/20  1640   HGB  --  13.5   NA  --  139   POTASSIUM  --  4.2   CR 0.73 0.71        Diagnostics:  Recent Results (from the past 48 hour(s))   Creatinine    Collection Time: 08/03/21  2:12 PM   Result Value Ref Range    Creatinine 0.73 0.60 - 1.10 mg/dL    GFR Estimate 84 >60 mL/min/1.73m2   Calcium    Collection Time: 08/03/21  2:12 PM   Result Value Ref Range    Calcium 9.2 8.5 - 10.5 mg/dL   Albumin level    Collection Time: 08/03/21  2:12 PM   Result Value Ref Range    Albumin 3.9 3.5 - 5.0 g/dL   CBC with platelets    Collection Time: 08/05/21 12:16 PM   Result Value Ref Range    WBC Count 4.8 4.0 - 11.0 10e3/uL    RBC Count 3.91 3.80 - 5.20 10e6/uL    Hemoglobin 12.5 11.7 - 15.7 g/dL    Hematocrit 38.5 35.0 - 47.0 %    MCV 99 78 - 100 fL    MCH 32.0 26.5 - 33.0 pg    MCHC 32.5 31.5 - " 36.5 g/dL    RDW 13.6 10.0 - 15.0 %    Platelet Count 131 (L) 150 - 450 10e3/uL   EKG 12-lead, tracing only    Collection Time: 08/05/21  5:49 PM   Result Value Ref Range    Systolic Blood Pressure      Diastolic Blood Pressure      Ventricular Rate 75 BPM    Atrial Rate 75 BPM    HI Interval 152 ms    QRS Duration 82 ms     ms    QTc 415 ms    P Axis 82 degrees    R AXIS -11 degrees    T Axis 28 degrees    Interpretation ECG       Sinus rhythm  Low voltage QRS  Borderline ECG  No previous ECGs available        EKG: appears normal, NSR, normal axis, normal intervals, no acute ST/T changes c/w ischemia, no LVH by voltage criteria, unchanged from previous tracings    Revised Cardiac Risk Index (RCRI):  The patient has the following serious cardiovascular risks for perioperative complications:   - No serious cardiac risks = 0 points     RCRI Interpretation: 0 points: Class I (very low risk - 0.4% complication rate)           Signed Electronically by: Fernanda Haas  Copy of this evaluation report is provided to requesting physician.

## 2021-08-06 LAB
ATRIAL RATE - MUSE: 75 BPM
DIASTOLIC BLOOD PRESSURE - MUSE: NORMAL MMHG
INTERPRETATION ECG - MUSE: NORMAL
P AXIS - MUSE: 82 DEGREES
PR INTERVAL - MUSE: 152 MS
QRS DURATION - MUSE: 82 MS
QT - MUSE: 372 MS
QTC - MUSE: 415 MS
R AXIS - MUSE: -11 DEGREES
SYSTOLIC BLOOD PRESSURE - MUSE: NORMAL MMHG
T AXIS - MUSE: 28 DEGREES
VENTRICULAR RATE- MUSE: 75 BPM

## 2021-08-16 ENCOUNTER — LAB (OUTPATIENT)
Dept: LAB | Facility: CLINIC | Age: 71
End: 2021-08-16
Payer: MEDICARE

## 2021-08-16 DIAGNOSIS — Z01.818 PREOP GENERAL PHYSICAL EXAM: ICD-10-CM

## 2021-08-16 PROCEDURE — U0003 INFECTIOUS AGENT DETECTION BY NUCLEIC ACID (DNA OR RNA); SEVERE ACUTE RESPIRATORY SYNDROME CORONAVIRUS 2 (SARS-COV-2) (CORONAVIRUS DISEASE [COVID-19]), AMPLIFIED PROBE TECHNIQUE, MAKING USE OF HIGH THROUGHPUT TECHNOLOGIES AS DESCRIBED BY CMS-2020-01-R: HCPCS

## 2021-08-16 PROCEDURE — U0005 INFEC AGEN DETEC AMPLI PROBE: HCPCS

## 2021-08-17 LAB — SARS-COV-2 RNA RESP QL NAA+PROBE: NEGATIVE

## 2021-09-03 ENCOUNTER — TRANSFERRED RECORDS (OUTPATIENT)
Dept: HEALTH INFORMATION MANAGEMENT | Facility: CLINIC | Age: 71
End: 2021-09-03

## 2022-04-21 ENCOUNTER — TELEPHONE (OUTPATIENT)
Dept: INTERNAL MEDICINE | Facility: CLINIC | Age: 72
End: 2022-04-21

## 2022-04-21 NOTE — TELEPHONE ENCOUNTER
04/21 lm for pt regarding an appt for May 2nd with  for a follow up on osteoporosis to call and reschedule if that does not work

## 2022-04-21 NOTE — TELEPHONE ENCOUNTER
Patient called. She would like a call from [facility] to discuss why she had and infusion in August 2021. She would also like to know when she should schedule her follow up with Dr. Bedolla.

## 2022-04-21 NOTE — TELEPHONE ENCOUNTER
Patient called.Per patient did not want an appointment, so the appointment has been cancelled.   She wants a call back from a CSS/Nursing team to discuss why she had the infusion last year and what her next steps should be. She said that she was previously on the prolia for many years and last year she had an infusion instead.    Please call patient to further discuss.

## 2022-07-06 ENCOUNTER — OFFICE VISIT (OUTPATIENT)
Dept: FAMILY MEDICINE | Facility: CLINIC | Age: 72
End: 2022-07-06
Payer: MEDICARE

## 2022-07-06 VITALS
DIASTOLIC BLOOD PRESSURE: 79 MMHG | SYSTOLIC BLOOD PRESSURE: 130 MMHG | HEART RATE: 74 BPM | WEIGHT: 108.6 LBS | BODY MASS INDEX: 16.08 KG/M2 | RESPIRATION RATE: 16 BRPM | HEIGHT: 69 IN

## 2022-07-06 DIAGNOSIS — R63.6 UNDERWEIGHT: ICD-10-CM

## 2022-07-06 DIAGNOSIS — M81.0 AGE-RELATED OSTEOPOROSIS WITHOUT CURRENT PATHOLOGICAL FRACTURE: ICD-10-CM

## 2022-07-06 DIAGNOSIS — M19.111 POST-TRAUMATIC OSTEOARTHRITIS OF RIGHT SHOULDER: ICD-10-CM

## 2022-07-06 DIAGNOSIS — Z01.818 PREOP GENERAL PHYSICAL EXAM: Primary | ICD-10-CM

## 2022-07-06 LAB
ALBUMIN SERPL BCG-MCNC: 4.5 G/DL (ref 3.5–5.2)
ALP SERPL-CCNC: 67 U/L (ref 35–104)
ALT SERPL W P-5'-P-CCNC: 16 U/L (ref 10–35)
ANION GAP SERPL CALCULATED.3IONS-SCNC: 13 MMOL/L (ref 7–15)
AST SERPL W P-5'-P-CCNC: 21 U/L (ref 10–35)
ATRIAL RATE - MUSE: 71 BPM
BILIRUB SERPL-MCNC: 0.5 MG/DL
BUN SERPL-MCNC: 10.9 MG/DL (ref 8–23)
CALCIUM SERPL-MCNC: 9.3 MG/DL (ref 8.8–10.2)
CHLORIDE SERPL-SCNC: 98 MMOL/L (ref 98–107)
CREAT SERPL-MCNC: 0.71 MG/DL (ref 0.51–0.95)
DEPRECATED HCO3 PLAS-SCNC: 26 MMOL/L (ref 22–29)
DIASTOLIC BLOOD PRESSURE - MUSE: NORMAL MMHG
ERYTHROCYTE [DISTWIDTH] IN BLOOD BY AUTOMATED COUNT: 12.9 % (ref 10–15)
GFR SERPL CREATININE-BSD FRML MDRD: 90 ML/MIN/1.73M2
GLUCOSE SERPL-MCNC: 95 MG/DL (ref 70–99)
HCT VFR BLD AUTO: 40.5 % (ref 35–47)
HGB BLD-MCNC: 13.4 G/DL (ref 11.7–15.7)
INTERPRETATION ECG - MUSE: NORMAL
MCH RBC QN AUTO: 32.4 PG (ref 26.5–33)
MCHC RBC AUTO-ENTMCNC: 33.1 G/DL (ref 31.5–36.5)
MCV RBC AUTO: 98 FL (ref 78–100)
P AXIS - MUSE: 82 DEGREES
PLATELET # BLD AUTO: 171 10E3/UL (ref 150–450)
POTASSIUM SERPL-SCNC: 4 MMOL/L (ref 3.4–5.3)
PR INTERVAL - MUSE: 160 MS
PROT SERPL-MCNC: 6.7 G/DL (ref 6.4–8.3)
QRS DURATION - MUSE: 82 MS
QT - MUSE: 382 MS
QTC - MUSE: 415 MS
R AXIS - MUSE: -19 DEGREES
RBC # BLD AUTO: 4.13 10E6/UL (ref 3.8–5.2)
SODIUM SERPL-SCNC: 137 MMOL/L (ref 136–145)
SYSTOLIC BLOOD PRESSURE - MUSE: NORMAL MMHG
T AXIS - MUSE: 6 DEGREES
VENTRICULAR RATE- MUSE: 71 BPM
WBC # BLD AUTO: 6.7 10E3/UL (ref 4–11)

## 2022-07-06 PROCEDURE — 36415 COLL VENOUS BLD VENIPUNCTURE: CPT | Performed by: FAMILY MEDICINE

## 2022-07-06 PROCEDURE — 93005 ELECTROCARDIOGRAM TRACING: CPT | Performed by: FAMILY MEDICINE

## 2022-07-06 PROCEDURE — 99214 OFFICE O/P EST MOD 30 MIN: CPT | Performed by: FAMILY MEDICINE

## 2022-07-06 PROCEDURE — 93010 ELECTROCARDIOGRAM REPORT: CPT | Mod: OFF | Performed by: GENERAL ACUTE CARE HOSPITAL

## 2022-07-06 PROCEDURE — 85027 COMPLETE CBC AUTOMATED: CPT | Performed by: FAMILY MEDICINE

## 2022-07-06 PROCEDURE — 80053 COMPREHEN METABOLIC PANEL: CPT | Performed by: FAMILY MEDICINE

## 2022-07-06 NOTE — PATIENT INSTRUCTIONS
Preparing for Your Surgery  Getting started  A nurse will call you to review your health history and instructions. They will give you an arrival time based on your scheduled surgery time. Please be ready to share:    Your doctor's clinic name and phone number    Your medical, surgical and anesthesia history    A list of allergies and sensitivities    A list of medicines, including herbal treatments and over-the-counter drugs    Whether the patient has a legal guardian (ask how to send us the papers in advance)  Please tell us if you're pregnant--or if there's any chance you might be pregnant. Some surgeries may injure a fetus (unborn baby), so they require a pregnancy test. Surgeries that are safe for a fetus don't always need a test, and you can choose whether to have one.   If you have a child who's having surgery, please ask for a copy of Preparing for Your Child's Surgery.    Preparing for surgery    Within 30 days of surgery: Have a pre-op exam (sometimes called an H&P, or History and Physical). This can be done at a clinic or pre-operative center.  ? If you're having a , you may not need this exam. Talk to your care team.    At your pre-op exam, talk to your care team about all medicines you take. If you need to stop any medicines before surgery, ask when to start taking them again.  ? We do this for your safety. Many medicines can make you bleed too much during surgery. Some change how well surgery (anesthesia) drugs work.    Call your insurance company to let them know you're having surgery. (If you don't have insurance, call [phone].)    Call your clinic if there's any change in your health. This includes signs of a cold or flu (sore throat, runny nose, cough, rash, fever). It also includes a scrape or scratch near the surgery site.    If you have questions on the day of surgery, call your hospital or surgery center.  COVID testing  You may need to be tested for COVID-19 before having  surgery. If so, we will give you instructions.  Eating and drinking guidelines  For your safety: Unless your surgeon tells you otherwise, follow the guidelines below.    Eat and drink as usual until 8 hours before surgery. After that, no food or milk.    Drink clear liquids until 2 hours before surgery. These are liquids you can see through, like water, Gatorade and Propel Water. You may also have black coffee and tea (no cream or milk).    Nothing by mouth within 2 hours of surgery. This includes gum, candy and breath mints.    If you drink alcohol: Stop drinking it the night before surgery.    If your care team tells you to take medicine on the morning of surgery, it's okay to take it with a sip of water.  Preventing infection    Shower or bathe the night before and morning of your surgery. Follow the instructions your clinic gave you. (If no instructions, use regular soap.)    Don't shave or clip hair near your surgery site. We'll remove the hair if needed.    Don't smoke or vape the morning of surgery. You may chew nicotine gum up to 2 hours before surgery. A nicotine patch is okay.  ? Note: Some surgeries require you to completely quit smoking and nicotine. Check with your surgeon.    Your care team will make every effort to keep you safe from infection. We will:  ? Clean our hands often with soap and water (or an alcohol-based hand rub).  ? Clean the skin at your surgery site with a special soap that kills germs.  ? Give you a special gown to keep you warm. (Cold raises the risk of infection.)  ? Wear special hair covers, masks, gowns and gloves during surgery.  ? Give antibiotic medicine, if prescribed. Not all surgeries need antibiotics.  What to bring on the day of surgery    Photo ID and insurance card    Copy of your health care directive, if you have one    Glasses and hearing aides (bring cases)  ? You can't wear contacts during surgery    Inhaler and eye drops, if you use them (tell us about these when  you arrive)    CPAP machine or breathing device, if you use them    A few personal items, if spending the night    If you have . . .  ? A pacemaker, ICD (cardiac defibrillator) or other implant: Bring the ID card.  ? An implanted stimulator: Bring the remote control.  ? A legal guardian: Bring a copy of the certified (court-stamped) guardianship papers.  Please remove any jewelry, including body piercings. Leave jewelry and other valuables at home.  If you're going home the day of surgery    You must have a responsible adult drive you home. They should stay with you overnight as well.    If you don't have someone to stay with you, and you aren't safe to go home alone, we may keep you overnight. Insurance often won't pay for this.  After surgery  If it's hard to control your pain or you need more pain medicine, please call your surgeon's office.  Questions?   If you have any questions for your care team, list them here: _________________________________________________________________________________________________________________________________________________________________________ ____________________________________ ____________________________________ ____________________________________  For informational purposes only. Not to replace the advice of your health care provider. Copyright   2003, 2019 [facility]. All rights reserved. Clinically reviewed by Cherrie Hassan MD. [employer] [id number] - REV 07/21.

## 2022-07-06 NOTE — PROGRESS NOTES
[facility]  [address] [facility] [address]  Phone: [phone]  Fax: [phone]  Primary Provider: Domi Bui (Inactive)  Pre-op Performing Provider: VLADIMIR RODRIGUEZ      PREOPERATIVE EVALUATION:  Today's date: 7/6/2022    Mirta Faria is a 72 year old female who presents for a preoperative evaluation.    Surgical Information:  Surgery/Procedure: Right shoulder replacement  [facility]   Surgeon: Dr. THOMAS Jiang  Surgery Date: 8/3/22  Time of Surgery: 11am  Where patient plans to recover: At home with family  Fax number for surgical facility: [phone]    Type of Anesthesia Anticipated: General    1. Preop general physical exam  Mirta is approved for surgery with general anesthesia.  EKG is normal.  Labs are pending.  She has her outpatient COVID test set up.  - EKG 12-lead, tracing only  - CBC with platelets; Future  - Comprehensive metabolic panel; Future    2. Post-traumatic osteoarthritis of right shoulder    3. Age-related osteoporosis without current pathological fracture  She follows with an endocrinologist in [place] and is getting Prolia.    4. Underweight  She is quite underweight with a BMI of 16 today.  She is discussed this with her primary care physician in [place].  Recommend continuing with protein supplements such as boost or Ensure twice daily.  Could consider seeing a dietitian.      Subjective     HPI related to upcoming procedure: Mirta presents for preop physical.  She gets most of her primary care in [place].  She is having a shoulder replaced.  She states she did well with her hip replacement without any issues with anesthesia.  She gets nauseous from tramadol.  She has no known coronary artery disease and denies chest pain or shortness of breath.  She has no limitations or physical activities.  She is a never smoker.  She has had no recent illnesses or exposures.  Her only prescription medication are topical.   She does see an endocrinologist for osteoporosis and gets Prolia injections.  I did notice she is quite underweight today.  She states she has discussed this with her primary care physician in [place].  She not having any pain, just decreased appetite.  Recommend protein supplements twice daily and consider seeing a dietitian.  No red flags to suggest occult malignancy.    Preop Questions 7/6/2022   1. Have you ever had a heart attack or stroke? No   2. Have you ever had surgery on your heart or blood vessels, such as a stent placement, a coronary artery bypass, or surgery on an artery in your head, neck, heart, or legs? No   3. Do you have chest pain with activity? No   4. Do you have a history of  heart failure? No   5. Do you currently have a cold, bronchitis or symptoms of other infection? No   6. Do you have a cough, shortness of breath, or wheezing? No   7. Do you or anyone in your family have previous history of blood clots? No   8. Do you or does anyone in your family have a serious bleeding problem such as prolonged bleeding following surgeries or cuts? No   9. Have you ever had problems with anemia or been told to take iron pills? No   10. Have you had any abnormal blood loss such as black, tarry or bloody stools, or abnormal vaginal bleeding? No   11. Have you ever had a blood transfusion? No   12. Are you willing to have a blood transfusion if it is medically needed before, during, or after your surgery? Yes   13. Have you or any of your relatives ever had problems with anesthesia? YES - tramadol/nausea   14. Do you have sleep apnea, excessive snoring or daytime drowsiness? No   15. Do you have any artifical heart valves or other implanted medical devices like a pacemaker, defibrillator, or continuous glucose monitor? No   16. Do you have artificial joints? YES - both ips   17. Are you allergic to latex? No       Health Care Directive:  Patient does not have a Health Care Directive or Living Will:  Patient states has Advance Directive and will bring in a copy to clinic.    Preoperative Review of :   reviewed - no record of controlled substances prescribed.      Status of Chronic Conditions:  See problem list for active medical problems.  Problems all longstanding and stable, except as noted/documented.  See ROS for pertinent symptoms related to these conditions.      Review of Systems  CONSTITUTIONAL: NEGATIVE for fever, chills, change in weight  INTEGUMENTARY/SKIN: NEGATIVE for worrisome rashes, moles or lesions  EYES: NEGATIVE for vision changes or irritation  ENT/MOUTH: NEGATIVE for ear, mouth and throat problems  RESP: NEGATIVE for significant cough or SOB  CV: NEGATIVE for chest pain, palpitations or peripheral edema  GI: NEGATIVE for nausea, abdominal pain, heartburn, or change in bowel habits  : NEGATIVE for frequency, dysuria, or hematuria  MUSCULOSKELETAL: NEGATIVE for significant arthralgias or myalgia  NEURO: NEGATIVE for weakness, dizziness or paresthesias  ENDOCRINE: NEGATIVE for temperature intolerance, skin/hair changes  HEME: NEGATIVE for bleeding problems  PSYCHIATRIC: NEGATIVE for changes in mood or affect    Patient Active Problem List    Diagnosis Date Noted     Osteoporosis 11/10/2016     Priority: Medium     Hypercalciuria      Priority: Medium     Created by Conversion  Replacement Utility updated for latest IMO load         Lumbar herniated disc 04/16/2015     Priority: Medium     Lichen Sclerosus Et Atrophicus      Priority: Medium     Created by Conversion         Benign Polyps Of The Large Intestine      Priority: Medium     Created by Conversion          Past Medical History:   Diagnosis Date     Colon cancer (H) 2005    benign polyp removed     Urinary tract infection      Past Surgical History:   Procedure Laterality Date     ZZHC EXCISION BRACH CLFT CYST,SUPERFICIAL      Description: Surgery Excis Of Branchial Cleft Cyst Confined Skin Subcut T;  Recorded: 08/18/2008;  "    Current Outpatient Medications   Medication Sig Dispense Refill     ascorbic acid, vitamin C, (VITAMIN C) 250 MG tablet [ASCORBIC ACID, VITAMIN C, (VITAMIN C) 250 MG TABLET] Take 250 mg by mouth daily.       CALCIUM CARBONATE (CALCIUM 500 ORAL) [CALCIUM CARBONATE (CALCIUM 500 ORAL)] Take by mouth.       clobetasol (TEMOVATE) 0.05 % cream [CLOBETASOL (TEMOVATE) 0.05 % CREAM] APPLY SPARINGLY TO AFFECTED AREA(S) TWICE DAILY 15 g 1     cranberry extract 300 mg Tab [CRANBERRY EXTRACT 300 MG TAB] Take by mouth.       estradioL (ESTRACE) 0.01 % (0.1 mg/gram) vaginal cream [ESTRADIOL (ESTRACE) 0.01 % (0.1 MG/GRAM) VAGINAL CREAM] 1 gm vaginally 3 times a week 42.5 g 2       Allergies   Allergen Reactions     Tramadol Nausea        Social History     Tobacco Use     Smoking status: Never Smoker     Smokeless tobacco: Never Used   Substance Use Topics     Alcohol use: Not on file       History   Drug Use Not on file         Objective     /79   Pulse 74   Resp 16   Ht 1.753 m (5' 9\")   Wt 49.3 kg (108 lb 9.6 oz)   LMP  (LMP Unknown)   BMI 16.04 kg/m      Physical Exam    GENERAL APPEARANCE: healthy, alert and no distress     EYES: EOMI, PERRL     HENT: ear canals and TM's normal and nose and mouth without ulcers or lesions     NECK: no adenopathy, no asymmetry, masses, or scars and thyroid normal to palpation     RESP: lungs clear to auscultation - no rales, rhonchi or wheezes     CV: regular rates and rhythm, normal S1 S2, no S3 or S4 and no murmur, click or rub     ABDOMEN:  soft, nontender, no HSM or masses and bowel sounds normal     MS: extremities normal- no gross deformities noted, no evidence of inflammation in joints, FROM in all extremities.     SKIN: no suspicious lesions or rashes     NEURO: Normal strength and tone, sensory exam grossly normal, mentation intact and speech normal     PSYCH: mentation appears normal. and affect normal/bright     LYMPHATICS: No cervical adenopathy    Recent Labs   Lab " Test 08/05/21  1216 08/03/21  1412 07/29/20  1640   HGB 12.5  --  13.5   *  --   --      --  139   POTASSIUM 3.6  --  4.2   CR 0.76 0.73 0.71        Diagnostics:  Recent Results (from the past 24 hour(s))   EKG 12-lead, tracing only    Collection Time: 07/06/22  9:45 AM   Result Value Ref Range    Systolic Blood Pressure  mmHg    Diastolic Blood Pressure  mmHg    Ventricular Rate 71 BPM    Atrial Rate 71 BPM    OR Interval 160 ms    QRS Duration 82 ms     ms    QTc 415 ms    P Axis 82 degrees    R AXIS -19 degrees    T Axis 6 degrees    Interpretation ECG       Sinus rhythm  Low voltage QRS  Borderline ECG  When compared with ECG of 05-AUG-2021 17:49,  No significant change was found     CBC with platelets    Collection Time: 07/06/22 10:16 AM   Result Value Ref Range    WBC Count 6.7 4.0 - 11.0 10e3/uL    RBC Count 4.13 3.80 - 5.20 10e6/uL    Hemoglobin 13.4 11.7 - 15.7 g/dL    Hematocrit 40.5 35.0 - 47.0 %    MCV 98 78 - 100 fL    MCH 32.4 26.5 - 33.0 pg    MCHC 33.1 31.5 - 36.5 g/dL    RDW 12.9 10.0 - 15.0 %    Platelet Count 171 150 - 450 10e3/uL      EKG: appears normal, NSR, normal axis, normal intervals, no acute ST/T changes c/w ischemia, no LVH by voltage criteria, unchanged from previous tracings    Revised Cardiac Risk Index (RCRI):  The patient has the following serious cardiovascular risks for perioperative complications:   - No serious cardiac risks = 0 points     RCRI Interpretation: 0 points: Class I (very low risk - 0.4% complication rate)           Signed Electronically by: Fernanda Haas  Copy of this evaluation report is provided to requesting physician.

## 2022-07-18 ENCOUNTER — TELEPHONE (OUTPATIENT)
Dept: FAMILY MEDICINE | Facility: CLINIC | Age: 72
End: 2022-07-18

## 2022-07-18 DIAGNOSIS — Z12.31 ENCOUNTER FOR SCREENING MAMMOGRAM FOR BREAST CANCER: Primary | ICD-10-CM

## 2022-07-18 NOTE — TELEPHONE ENCOUNTER
Patient calling for a mammogram order.  She says her last one was two years ago.  Please put one in or advise patient.

## 2022-07-20 ENCOUNTER — APPOINTMENT (OUTPATIENT)
Dept: LAB | Facility: CLINIC | Age: 72
End: 2022-07-20
Payer: MEDICARE

## 2022-07-20 DIAGNOSIS — Z01.818 PREOP GENERAL PHYSICAL EXAM: Primary | ICD-10-CM

## 2022-07-20 PROCEDURE — 87641 MR-STAPH DNA AMP PROBE: CPT | Performed by: FAMILY MEDICINE

## 2022-07-20 PROCEDURE — 87640 STAPH A DNA AMP PROBE: CPT | Mod: 59 | Performed by: FAMILY MEDICINE

## 2022-07-21 ENCOUNTER — TELEPHONE (OUTPATIENT)
Dept: FAMILY MEDICINE | Facility: CLINIC | Age: 72
End: 2022-07-21

## 2022-07-21 LAB
MRSA DNA SPEC QL NAA+PROBE: NEGATIVE
SA TARGET DNA: NEGATIVE

## 2022-07-25 ENCOUNTER — TRANSFERRED RECORDS (OUTPATIENT)
Dept: HEALTH INFORMATION MANAGEMENT | Facility: CLINIC | Age: 72
End: 2022-07-25

## 2022-08-02 DIAGNOSIS — L94.0 CIRCUMSCRIBED SCLERODERMA: ICD-10-CM

## 2022-08-02 RX ORDER — CLOBETASOL PROPIONATE 0.5 MG/G
CREAM TOPICAL
Qty: 15 G | Refills: 1 | Status: SHIPPED | OUTPATIENT
Start: 2022-08-02 | End: 2022-08-10

## 2022-08-05 DIAGNOSIS — M89.9 DISORDER OF BONE AND CARTILAGE: ICD-10-CM

## 2022-08-05 DIAGNOSIS — M94.9 DISORDER OF BONE AND CARTILAGE: ICD-10-CM

## 2022-08-05 RX ORDER — ESTRADIOL 0.1 MG/G
CREAM VAGINAL
Qty: 42.5 G | Refills: 2 | Status: SHIPPED | OUTPATIENT
Start: 2022-08-05 | End: 2024-08-26

## 2022-08-05 NOTE — TELEPHONE ENCOUNTER
"Routing refill request to provider for review/approval because:  No mammogram on file in past 2 years    Last Written Prescription Date: 5/19/21  Last Fill Quantity: 42.5 g, # refills: 2  Last office visit provider: Medley 7/6/22        Requested Prescriptions   Pending Prescriptions Disp Refills    estradiol (ESTRACE) 0.1 MG/GM vaginal cream 42.5 g 2        Hormone Replacement Therapy Failed - 8/5/2022 10:22 AM        Failed - Patient has mammogram in past 2 years on file if age 50-75        Passed - Blood pressure under 140/90 in past 12 months       BP Readings from Last 3 Encounters:   07/06/22 130/79   08/05/21 99/62   08/03/21 121/74                 Passed - Recent (12 mo) or future (30 days) visit within the authorizing provider's specialty     Patient has had an office visit with the authorizing provider or a provider within the authorizing providers department within the previous 12 mos or has a future within next 30 days. See \"Patient Info\" tab in inbasket, or \"Choose Columns\" in Meds & Orders section of the refill encounter.              Passed - Medication is active on med list        Passed - Patient is 18 years of age or older        Passed - No active pregnancy on record        Passed - No positive pregnancy test on record in past 12 months                  Catherine Hood RN 08/05/22 2:58 PM    "

## 2022-08-05 NOTE — TELEPHONE ENCOUNTER
Medication Request  Medication name: estradioL (ESTRACE) 0.01 % (0.1 mg/gram) vaginal cream  Requested Pharmacy: [id number]  When was patient last seen for this?:  7/6/22  Patient offered appointment:  No  Okay to leave a detailed message: yes

## 2022-08-10 RX ORDER — CLOBETASOL PROPIONATE 0.5 MG/G
CREAM TOPICAL
Qty: 15 G | Refills: 1 | Status: SHIPPED | OUTPATIENT
Start: 2022-08-10

## 2022-08-10 NOTE — TELEPHONE ENCOUNTER
This prescription failed to be sent to pharmacy. Please send again.     Transmission to pharmacy failed (8/2/2022  3:26 PM CDT)    Please sign pended order    Pt would like a call back once this is sent

## 2022-11-11 ENCOUNTER — OFFICE VISIT (OUTPATIENT)
Dept: INTERNAL MEDICINE | Facility: CLINIC | Age: 72
End: 2022-11-11
Payer: MEDICARE

## 2022-11-11 VITALS
TEMPERATURE: 98.8 F | SYSTOLIC BLOOD PRESSURE: 121 MMHG | HEART RATE: 72 BPM | BODY MASS INDEX: 16.57 KG/M2 | WEIGHT: 111.9 LBS | OXYGEN SATURATION: 98 % | DIASTOLIC BLOOD PRESSURE: 85 MMHG | HEIGHT: 69 IN

## 2022-11-11 DIAGNOSIS — M81.0 AGE-RELATED OSTEOPOROSIS WITHOUT CURRENT PATHOLOGICAL FRACTURE: Primary | ICD-10-CM

## 2022-11-11 PROCEDURE — 99213 OFFICE O/P EST LOW 20 MIN: CPT | Performed by: INTERNAL MEDICINE

## 2022-11-11 RX ORDER — ACETAMINOPHEN 500 MG
1000 TABLET ORAL
COMMUNITY
Start: 2022-08-03

## 2022-11-11 NOTE — PROGRESS NOTES
Assessment & Plan     Age-related osteoporosis without current pathological fracture    I am surmising that she may be here for a follow-up bone density and possibility for repeat Reclast.  However I do not believe there will be much time to do that and according to her telephone encounter Dr. Tena had wanted to give her a drug holiday after 1 infusion of the Reclast.  I did emphasize that after her DEXA scan she should call the office in order to get the results straightaway and decide her future plan.  My understanding is that once Prolia is started it should be continued indefinitely.  But if her bone density does not decline it would be reasonable to do a drug holiday now.      She declines vaccines         Return if symptoms worsen or fail to improve, for Follow up.    Jewels Anthony MD  [facility]   Mirta is a 72 year old accompanied by her ALONE, presenting for the following health issues:  Recheck Medication and Follow Up      History of Present Illness       Reason for visit:  FOLLOW UP    She eats 2-3 servings of fruits and vegetables daily.She consumes 1 sweetened beverage(s) daily.She exercises with enough effort to increase her heart rate 20 to 29 minutes per day.  She exercises with enough effort to increase her heart rate 7 days per week.   She is taking medications regularly.   This is an unusual situation in which patient has a.  [place].  But has her osteoporosis managed here by physicians here.  However she has not seen her Dr. Bedolla for over 2 years.  She also does not appear to be very clear on her purpose of this visit.  She said it is a follow-up.  She is going for her scheduled bone density and mammogram.  But I did have to remind her about those 2 tests.  At any rate it appears that she was diagnosed with osteoporosis in 2016 and was started on Prolia and because of reported improvement in bone density her Prolia which  "she stopped . Last shot in 2/26/2021  For unclear reasons she was put on Reclast and she did receive 1 infusion in August 2011 will here in [place].      Telephone ecnounter 7/2021 of dr vasquez : Patient called. She received her last Prolia on 02.26.2021 in [place]. Per letter she received from Dr. Vasquez she is to have a reclast after her last prolia injection and go on a drug holiday afterwards.     Please call her to further discuss @ [phone]     She was on hydrochlorothiazide I suspect for hypercalciuria but that was also stopped.  Patient Active Problem List   Diagnosis     Benign Polyps Of The Large Intestine     Hypercalciuria     Lichen Sclerosus Et Atrophicus     Lumbar herniated disc     Osteoporosis     Current Outpatient Medications   Medication     acetaminophen (TYLENOL) 500 MG tablet     ascorbic acid, vitamin C, (VITAMIN C) 250 MG tablet     CALCIUM CARBONATE (CALCIUM 500 ORAL)     clobetasol (TEMOVATE) 0.05 % external cream     cranberry extract 300 mg Tab     estradiol (ESTRACE) 0.1 MG/GM vaginal cream     No current facility-administered medications for this visit.         Review of Systems         Objective    /85 (BP Location: Left arm, Patient Position: Sitting, Cuff Size: Adult Regular)   Pulse 72   Temp 98.8  F (37.1  C) (Tympanic)   Ht 1.74 m (5' 8.5\")   Wt 50.8 kg (111 lb 14.4 oz)   LMP  (LMP Unknown)   SpO2 98%   BMI 16.77 kg/m    There is no height or weight on file to calculate BMI.  Physical Exam   Deferred       "

## 2022-11-11 NOTE — PATIENT INSTRUCTIONS
DR CARRASCO LAST DOCUMENTATION WANTED YOU TO HAVE RECLAST IN AUGUST AND THEN A DRUG HOLIDAY PENDING ON THE NEW DEXA SCAN RESULT . PLEASE CALL DR CARRASCO FOR THE RESULT

## 2022-11-14 ENCOUNTER — HOSPITAL ENCOUNTER (OUTPATIENT)
Dept: BONE DENSITY | Facility: HOSPITAL | Age: 72
Discharge: HOME | End: 2022-11-14
Attending: INTERNAL MEDICINE
Payer: MEDICARE

## 2022-11-14 ENCOUNTER — ANCILLARY PROCEDURE (OUTPATIENT)
Dept: MAMMOGRAPHY | Facility: CLINIC | Age: 72
End: 2022-11-14
Attending: FAMILY MEDICINE
Payer: MEDICARE

## 2022-11-14 DIAGNOSIS — Z12.31 ENCOUNTER FOR SCREENING MAMMOGRAM FOR BREAST CANCER: ICD-10-CM

## 2022-11-14 DIAGNOSIS — Z92.29 PERSONAL HISTORY OF DRUG THERAPY: ICD-10-CM

## 2022-11-14 DIAGNOSIS — M81.0 AGE-RELATED OSTEOPOROSIS WITHOUT CURRENT PATHOLOGICAL FRACTURE: ICD-10-CM

## 2022-11-14 PROCEDURE — 77067 SCR MAMMO BI INCL CAD: CPT

## 2022-11-14 PROCEDURE — 77080 DXA BONE DENSITY AXIAL: CPT

## 2022-11-14 PROCEDURE — 77081 DXA BONE DENSITY APPENDICULR: CPT

## 2022-11-21 ENCOUNTER — ANCILLARY PROCEDURE (OUTPATIENT)
Dept: MAMMOGRAPHY | Facility: CLINIC | Age: 72
End: 2022-11-21
Attending: FAMILY MEDICINE
Payer: MEDICARE

## 2022-11-21 DIAGNOSIS — R92.0 MICROCALCIFICATION OF RIGHT BREAST ON MAMMOGRAM: ICD-10-CM

## 2022-11-21 PROCEDURE — 77065 DX MAMMO INCL CAD UNI: CPT | Mod: RT

## 2023-02-14 ENCOUNTER — TRANSFERRED RECORDS (OUTPATIENT)
Dept: HEALTH INFORMATION MANAGEMENT | Facility: CLINIC | Age: 73
End: 2023-02-14

## 2023-02-27 ENCOUNTER — TRANSFERRED RECORDS (OUTPATIENT)
Dept: HEALTH INFORMATION MANAGEMENT | Facility: CLINIC | Age: 73
End: 2023-02-27

## 2023-03-13 ENCOUNTER — TELEPHONE (OUTPATIENT)
Dept: FAMILY MEDICINE | Facility: CLINIC | Age: 73
End: 2023-03-13
Payer: MEDICARE

## 2023-03-13 DIAGNOSIS — M81.0 OSTEOPOROSIS, UNSPECIFIED OSTEOPOROSIS TYPE, UNSPECIFIED PATHOLOGICAL FRACTURE PRESENCE: Primary | ICD-10-CM

## 2023-03-13 NOTE — TELEPHONE ENCOUNTER
Incoming call from patient requesting a referral for a osteoporosis specialist because Dr. Bedolla retired.  Patient lives in [place] and would like to see someone around that area. Endocrinology referral pending for PCP review.

## 2023-03-29 ENCOUNTER — TELEPHONE (OUTPATIENT)
Dept: FAMILY MEDICINE | Facility: CLINIC | Age: 73
End: 2023-03-29
Payer: MEDICARE

## 2023-03-29 NOTE — TELEPHONE ENCOUNTER
Patient called to make sure we have the results from her breast scan last November. She wants the records sent to her. I told her I would send out a records release form in the mail today.

## 2023-05-05 ENCOUNTER — OFFICE VISIT (OUTPATIENT)
Dept: FAMILY MEDICINE | Facility: CLINIC | Age: 73
End: 2023-05-05
Payer: MEDICARE

## 2023-05-05 VITALS
DIASTOLIC BLOOD PRESSURE: 70 MMHG | OXYGEN SATURATION: 99 % | RESPIRATION RATE: 16 BRPM | WEIGHT: 119 LBS | BODY MASS INDEX: 17.83 KG/M2 | SYSTOLIC BLOOD PRESSURE: 125 MMHG | HEART RATE: 80 BPM

## 2023-05-05 DIAGNOSIS — R30.0 DYSURIA: ICD-10-CM

## 2023-05-05 DIAGNOSIS — N30.00 ACUTE CYSTITIS WITHOUT HEMATURIA: Primary | ICD-10-CM

## 2023-05-05 LAB
ALBUMIN UR-MCNC: NEGATIVE MG/DL
APPEARANCE UR: CLEAR
BACTERIA #/AREA URNS HPF: ABNORMAL /HPF
BILIRUB UR QL STRIP: NEGATIVE
COLOR UR AUTO: YELLOW
GLUCOSE UR STRIP-MCNC: NEGATIVE MG/DL
HGB UR QL STRIP: NEGATIVE
KETONES UR STRIP-MCNC: NEGATIVE MG/DL
LEUKOCYTE ESTERASE UR QL STRIP: ABNORMAL
NITRATE UR QL: NEGATIVE
PH UR STRIP: 6 [PH] (ref 5–8)
RBC #/AREA URNS AUTO: ABNORMAL /HPF
SP GR UR STRIP: 1.01 (ref 1–1.03)
SQUAMOUS #/AREA URNS AUTO: ABNORMAL /LPF
UROBILINOGEN UR STRIP-ACNC: 0.2 E.U./DL
WBC #/AREA URNS AUTO: ABNORMAL /HPF
WBC CLUMPS #/AREA URNS HPF: PRESENT /HPF

## 2023-05-05 PROCEDURE — 99213 OFFICE O/P EST LOW 20 MIN: CPT | Performed by: PHYSICIAN ASSISTANT

## 2023-05-05 PROCEDURE — 81001 URINALYSIS AUTO W/SCOPE: CPT | Performed by: PHYSICIAN ASSISTANT

## 2023-05-05 PROCEDURE — 87086 URINE CULTURE/COLONY COUNT: CPT | Performed by: PHYSICIAN ASSISTANT

## 2023-05-05 RX ORDER — SULFAMETHOXAZOLE/TRIMETHOPRIM 800-160 MG
1 TABLET ORAL 2 TIMES DAILY
Qty: 6 TABLET | Refills: 0 | Status: SHIPPED | OUTPATIENT
Start: 2023-05-05 | End: 2023-05-08

## 2023-05-05 NOTE — PROGRESS NOTES
Assessment & Plan     Acute cystitis without hematuria  New problem, UA today shows WBC and WBC in clumps with trace leukocyte esterase. Will treat with Bactrim BID x 3 days. UC sent out.  Follow up if symptoms worsen or do not improve.  - sulfamethoxazole-trimethoprim (BACTRIM DS) 800-160 MG tablet; Take 1 tablet by mouth 2 times daily for 3 days    Dysuria  - UA Macroscopic with reflex to Microscopic and Culture; Future  - UA Macroscopic with reflex to Microscopic and Culture  - UA Microscopic with Reflex to Culture  - Urine Culture             Risks, benefits and alternatives were discussed with patient. Agreeable to the plan of care.      Cathy Zabala PA-C  [facility]    Moises Kirby is a 73 year old, presenting for the following health issues:  UTI (Urinary urgency, lower abdominal cramping, [facility] [place] [facility], started having symptoms again 2 days ago)        5/5/2023    12:15 PM   Additional Questions   Roomed by Tamara GILLESPIE CMA     UTI    History of Present Illness       Reason for visit:  Urinary infection    She eats 2-3 servings of fruits and vegetables daily.She consumes 0 sweetened beverage(s) daily.She exercises with enough effort to increase her heart rate 20 to 29 minutes per day.  She exercises with enough effort to increase her heart rate 4 days per week.   She is taking medications regularly.       Patient is here today for possible UTI  Was in [place] and treated for UTI with Cipro, culture came back resistant and thus was treated with Macrobid, finished Macrobid 4/30/23.  She states she recently stopped the antibiotics but 2 days ago started having symptoms again  No fever, chills, flank pain  No blood in the urine  Just notes urgency again        Review of Systems   Constitutional, HEENT, cardiovascular, pulmonary, gi and gu systems are negative, except as otherwise noted.      Objective     /70 (BP Location: Left arm, Patient Position: Sitting, Cuff Size: Adult Regular)   Pulse 80   Resp 16   Wt 54 kg (119 lb)   LMP  (LMP Unknown)   SpO2 99%   BMI 17.83 kg/m    Body mass index is 17.83 kg/m .  Physical Exam   GENERAL: healthy, alert and no distress  NECK: no adenopathy, no asymmetry, masses, or scars and thyroid normal to palpation  RESP: lungs clear to auscultation - no rales, rhonchi or wheezes  CV: regular rate and rhythm, normal S1 S2, no S3 or S4, no murmur, click or rub, no peripheral edema and peripheral pulses strong  ABDOMEN: soft, nontender, no hepatosplenomegaly, no masses and bowel sounds normal  MS: no gross musculoskeletal defects noted, no edema    Results for orders placed or performed in visit on 05/05/23 (from the past 24 hour(s))   UA Macroscopic with reflex to Microscopic and Culture    Specimen: Urine, Clean Catch   Result Value Ref Range    Color Urine Yellow Colorless, Straw, Light Yellow, Yellow    Appearance Urine Clear Clear    Glucose Urine Negative Negative mg/dL    Bilirubin Urine Negative Negative    Ketones Urine Negative Negative mg/dL    Specific Gravity Urine 1.015 1.005 - 1.030    Blood Urine Negative Negative    pH Urine 6.0 5.0 - 8.0    Protein Albumin Urine Negative Negative mg/dL    Urobilinogen Urine 0.2 0.2, 1.0 E.U./dL    Nitrite Urine Negative Negative    Leukocyte Esterase Urine Trace (A) Negative   UA Microscopic with Reflex to Culture   Result Value Ref Range    Bacteria Urine Few (A) None Seen /HPF    RBC Urine None Seen 0-2 /HPF /HPF    WBC Urine 25-50 (A) 0-5 /HPF /HPF    Squamous Epithelials Urine Few (A) None Seen /LPF    WBC Clumps Urine Present (A) None Seen /HPF

## 2023-05-07 LAB — BACTERIA UR CULT: NORMAL

## 2023-05-10 ENCOUNTER — TELEPHONE (OUTPATIENT)
Dept: ENDOCRINOLOGY | Facility: CLINIC | Age: 73
End: 2023-05-10
Payer: MEDICARE

## 2023-05-10 NOTE — TELEPHONE ENCOUNTER
HAN and sent letter for patient to reschedule 10/6/23 appointment with Dr. Gonzales as provider is unavailable that day.

## 2023-05-19 ENCOUNTER — OFFICE VISIT (OUTPATIENT)
Dept: FAMILY MEDICINE | Facility: CLINIC | Age: 73
End: 2023-05-19
Payer: MEDICARE

## 2023-05-19 ENCOUNTER — TELEPHONE (OUTPATIENT)
Dept: FAMILY MEDICINE | Facility: CLINIC | Age: 73
End: 2023-05-19

## 2023-05-19 VITALS
RESPIRATION RATE: 16 BRPM | BODY MASS INDEX: 17.38 KG/M2 | WEIGHT: 117.38 LBS | HEART RATE: 76 BPM | OXYGEN SATURATION: 99 % | DIASTOLIC BLOOD PRESSURE: 74 MMHG | SYSTOLIC BLOOD PRESSURE: 120 MMHG | HEIGHT: 69 IN

## 2023-05-19 DIAGNOSIS — N30.01 ACUTE CYSTITIS WITH HEMATURIA: Primary | ICD-10-CM

## 2023-05-19 DIAGNOSIS — R35.0 FREQUENT URINATION: ICD-10-CM

## 2023-05-19 LAB
ALBUMIN UR-MCNC: NEGATIVE MG/DL
APPEARANCE UR: ABNORMAL
BACTERIA #/AREA URNS HPF: ABNORMAL /HPF
BILIRUB UR QL STRIP: NEGATIVE
COLOR UR AUTO: YELLOW
GLUCOSE UR STRIP-MCNC: NEGATIVE MG/DL
HGB UR QL STRIP: ABNORMAL
KETONES UR STRIP-MCNC: NEGATIVE MG/DL
LEUKOCYTE ESTERASE UR QL STRIP: ABNORMAL
NITRATE UR QL: POSITIVE
PH UR STRIP: 7 [PH] (ref 5–8)
RBC #/AREA URNS AUTO: ABNORMAL /HPF
SP GR UR STRIP: 1.01 (ref 1–1.03)
SQUAMOUS #/AREA URNS AUTO: ABNORMAL /LPF
UROBILINOGEN UR STRIP-ACNC: 0.2 E.U./DL
WBC #/AREA URNS AUTO: ABNORMAL /HPF
WBC CLUMPS #/AREA URNS HPF: PRESENT /HPF

## 2023-05-19 PROCEDURE — 87086 URINE CULTURE/COLONY COUNT: CPT | Performed by: FAMILY MEDICINE

## 2023-05-19 PROCEDURE — 87088 URINE BACTERIA CULTURE: CPT | Performed by: FAMILY MEDICINE

## 2023-05-19 PROCEDURE — 81001 URINALYSIS AUTO W/SCOPE: CPT | Performed by: FAMILY MEDICINE

## 2023-05-19 PROCEDURE — 87186 SC STD MICRODIL/AGAR DIL: CPT | Performed by: FAMILY MEDICINE

## 2023-05-19 RX ORDER — SULFAMETHOXAZOLE/TRIMETHOPRIM 800-160 MG
1 TABLET ORAL 2 TIMES DAILY
Qty: 14 TABLET | Refills: 0 | Status: SHIPPED | OUTPATIENT
Start: 2023-05-19 | End: 2023-05-26

## 2023-05-19 NOTE — TELEPHONE ENCOUNTER
Dr. Haas,    Patient does not have the ability to submit an E-visit for this concern.  Additionally, lab staff found me and told me that her  had dropped off a sterile container with urine for analysis this morning. I told her that without a provider's order we cannot do anything with her urine and that she would need a visit. She more or less hung up the phone after that point.    I would assume the next step would be an office visit of some kind correct?    Leighton Cronin RN     [facility]

## 2023-05-19 NOTE — PROGRESS NOTES
"  Assessment & Plan     Acute cystitis with hematuria  Frequent urination  Patient was in somewhat of a rush today, and asked if she could leave and have me call once I had the results to discuss plan.  Once I reviewed UA results, I did give patient a call, informed her that while initial UA did suggest UTI, it did look somewhat contaminated due to the moderate amount of squamous epithelial cells.  We will begin treatment with 7-day course of Bactrim, however I did inform her that we would also wait for urine culture results.  If those are negative, somebody will call her and inform her to stop antibiotics.  Also informed her that there is a possibility that if urine culture is positive, it may also be resistant to this medication, and she may need to change antibiotics.  -I did also recommend that she follow-up with her PCP, given she is having frequent UTIs/UTI-like symptoms, they can discuss whether she should consider daily low-dose prophylactic antibiotics.  If urine culture is negative, consider referral to urology for further evaluation.  Patient understands, agrees with plan.  - sulfamethoxazole-trimethoprim (BACTRIM DS) 800-160 MG tablet; Take 1 tablet by mouth 2 times daily for 7 days  - UA Macroscopic with reflex to Microscopic and Culture; Future  - UA Macroscopic with reflex to Microscopic and Culture  - Urine Microscopic Exam  - Urine Culture      17 minutes spent by me on the date of the encounter doing chart review, history and exam, documentation and further activities per the note           Nena Bundy MD  [facility]    Moises Kirby is a 73 year old, presenting for the following health issues:  Frequency (Urgent/ frequent urination x 1 day , cramping/)        5/19/2023     1:24 PM   Additional Questions   Roomed by Alexandria MOLINA   Accompanied by alanna     73-year-old female, new to me, here today with UTI symptoms.  Patient states that she has \"frequent\" UTIs, " "has had 3-4 UTIs in the past year.  Was most recently seen in early May, 2023 in urgent care, was given a 3-day prescription of Bactrim, however urine culture was actually negative.  She does spend half the year in [place].    This most recent episode started approximately 1-1/2 days ago with urgency, dysuria, increased urinary frequency.  Denies any hematuria, back or belly pain.  No fevers.    History of Present Illness       Reason for visit:  Urine infection    She eats 2-3 servings of fruits and vegetables daily.She consumes 0 sweetened beverage(s) daily.She exercises with enough effort to increase her heart rate 20 to 29 minutes per day.  She exercises with enough effort to increase her heart rate 5 days per week.   She is taking medications regularly.       Genitourinary - Female  Onset/Duration: 5/18/23  Description:   Painful urination (Dysuria): YES           Frequency: YES  Blood in urine (Hematuria): No  Delay in urine (Hesitency): No  Intensity: moderate  Progression of Symptoms:  same and intermittent  Accompanying Signs & Symptoms:  Fever/chills: No  Flank pain: No  Nausea and vomiting: No  Vaginal symptoms: none  Abdominal/Pelvic Pain: No  History:   History of frequent UTI s: YES  History of kidney stones: No  Sexually Active: YES  Possibility of pregnancy: No  Precipitating or alleviating factors: None  Therapies tried and outcome: Cranberry juice prn (contraindicated in Coumadin patients) and  not helpful           Review of Systems   CONSTITUTIONAL: NEGATIVE for fever, chills, change in weight  : normal menstrual cycles, dysuria, frequency and urgency      Objective    /74 (BP Location: Left arm, Patient Position: Sitting, Cuff Size: Adult Regular)   Pulse 76   Resp 16   Ht 1.74 m (5' 8.5\")   Wt 53.2 kg (117 lb 6 oz)   LMP  (LMP Unknown)   SpO2 99%   BMI 17.59 kg/m    Body mass index is 17.59 kg/m .  Physical Exam   GENERAL: healthy, alert and no distress  NECK: no adenopathy, no " asymmetry, masses, or scars and thyroid normal to palpation  ABDOMEN: soft, nontender, no hepatosplenomegaly, no masses and bowel sounds normal  MS: no CVA tenderness    Results for orders placed or performed in visit on 05/19/23 (from the past 24 hour(s))   UA Macroscopic with reflex to Microscopic and Culture    Specimen: Urine, Midstream   Result Value Ref Range    Color Urine Yellow Colorless, Straw, Light Yellow, Yellow    Appearance Urine Slightly Cloudy (A) Clear    Glucose Urine Negative Negative mg/dL    Bilirubin Urine Negative Negative    Ketones Urine Negative Negative mg/dL    Specific Gravity Urine 1.015 1.005 - 1.030    Blood Urine Trace (A) Negative    pH Urine 7.0 5.0 - 8.0    Protein Albumin Urine Negative Negative mg/dL    Urobilinogen Urine 0.2 0.2, 1.0 E.U./dL    Nitrite Urine Positive (A) Negative    Leukocyte Esterase Urine Moderate (A) Negative   Urine Microscopic Exam   Result Value Ref Range    Bacteria Urine Moderate (A) None Seen /HPF    RBC Urine 0-2 0-2 /HPF /HPF    WBC Urine  (A) 0-5 /HPF /HPF    Squamous Epithelials Urine Moderate (A) None Seen /LPF    WBC Clumps Urine Present (A) None Seen /HPF

## 2023-05-19 NOTE — TELEPHONE ENCOUNTER
Incoming call from pt:  States that she refuses to use My Chart, is scheduled for office visit today with Bundy for orders

## 2023-05-19 NOTE — TELEPHONE ENCOUNTER
Incoming call from patient stating that she was recently seen on May 5 and was diagnosed with urinary tract infection subsequently treated with Bactrim.  Urinary culture came back with urogenital tosha and no evidence of acute infection.  Patient was advised to stop antibiotics.    Patient calling in this morning stating that she is having urinary urgency, dysuria.  No fever, back pain or other more concerning symptoms present.  She states that she has a sterile container at home and was able to collect a sample this morning.  She states she will have her  drop it off for analysis.  I did explain that we do not have an order to analyze the urine specimen and that I would need to talk with the provider about this.    Please advise,     Leighton Cronin RN     [facility]

## 2023-05-19 NOTE — TELEPHONE ENCOUNTER
Incoming call from Mirta,    Patient was inquiring about the status of her UA.  I told her that we have not run any laboratory testing because there are no orders, laboratory testing is only done with an order from a physician.  I explained that PCP recommended an E-visit/some type of visit.  Patient states that this is ridiculous and that we should be able to  Analyze her urine.    She would like a message sent to you.    Leighton Cronin RN     [facility]

## 2023-05-22 LAB
BACTERIA UR CULT: ABNORMAL
BACTERIA UR CULT: ABNORMAL

## 2023-05-24 ENCOUNTER — TELEPHONE (OUTPATIENT)
Dept: FAMILY MEDICINE | Facility: CLINIC | Age: 73
End: 2023-05-24
Payer: MEDICARE

## 2023-05-24 DIAGNOSIS — R92.0 MICROCALCIFICATION OF BREAST: Primary | ICD-10-CM

## 2023-05-24 NOTE — TELEPHONE ENCOUNTER
Patient called a little upset stated she is trying to get scheduled for a 6 month follow up for her mammogram with the breast center in [place] and people have sent her all over the place. Stated she was told that she needs an order to even see them for this type of visit. She wants to be contacted with information and number to reach them at if an order is placed. If one does not need to be placed please reach patient back to let her know what needs to be done.

## 2023-05-24 NOTE — TELEPHONE ENCOUNTER
Called and spoke with patient. Explained orders needed to come from PCP and that they have now been placed and she should be able to schedule. Patient stated understanding and had no further questions or concerns.    Catherine Montes RN

## 2023-05-24 NOTE — TELEPHONE ENCOUNTER
Because they are requesting a diagnostic mammogram, this has to come from the PCP.  I just placed the order so she should be able to schedule now.

## 2023-05-24 NOTE — TELEPHONE ENCOUNTER
I am unaware if an order needs to be placed for this type of visit. Does not currently have order on file. Dr Haas, can you please review? Thank you!  Christine Barajas RN

## 2023-05-26 ENCOUNTER — OFFICE VISIT (OUTPATIENT)
Dept: FAMILY MEDICINE | Facility: CLINIC | Age: 73
End: 2023-05-26
Payer: MEDICARE

## 2023-05-26 VITALS
RESPIRATION RATE: 12 BRPM | DIASTOLIC BLOOD PRESSURE: 79 MMHG | OXYGEN SATURATION: 100 % | HEART RATE: 63 BPM | SYSTOLIC BLOOD PRESSURE: 160 MMHG

## 2023-05-26 DIAGNOSIS — N30.01 ACUTE CYSTITIS WITH HEMATURIA: Primary | ICD-10-CM

## 2023-05-26 DIAGNOSIS — R30.0 DYSURIA: ICD-10-CM

## 2023-05-26 LAB
ALBUMIN UR-MCNC: ABNORMAL MG/DL
APPEARANCE UR: ABNORMAL
BACTERIA #/AREA URNS HPF: ABNORMAL /HPF
BILIRUB UR QL STRIP: NEGATIVE
COLOR UR AUTO: YELLOW
GLUCOSE UR STRIP-MCNC: NEGATIVE MG/DL
HGB UR QL STRIP: ABNORMAL
KETONES UR STRIP-MCNC: NEGATIVE MG/DL
LEUKOCYTE ESTERASE UR QL STRIP: ABNORMAL
NITRATE UR QL: NEGATIVE
PH UR STRIP: 5.5 [PH] (ref 5–8)
RBC #/AREA URNS AUTO: ABNORMAL /HPF
SP GR UR STRIP: 1.02 (ref 1–1.03)
SQUAMOUS #/AREA URNS AUTO: ABNORMAL /LPF
UROBILINOGEN UR STRIP-ACNC: 0.2 E.U./DL
WBC #/AREA URNS AUTO: ABNORMAL /HPF

## 2023-05-26 PROCEDURE — 87086 URINE CULTURE/COLONY COUNT: CPT | Performed by: PHYSICIAN ASSISTANT

## 2023-05-26 PROCEDURE — 87186 SC STD MICRODIL/AGAR DIL: CPT | Performed by: PHYSICIAN ASSISTANT

## 2023-05-26 PROCEDURE — 99213 OFFICE O/P EST LOW 20 MIN: CPT | Performed by: PHYSICIAN ASSISTANT

## 2023-05-26 PROCEDURE — 81001 URINALYSIS AUTO W/SCOPE: CPT | Performed by: PHYSICIAN ASSISTANT

## 2023-05-26 RX ORDER — NITROFURANTOIN 25; 75 MG/1; MG/1
100 CAPSULE ORAL 2 TIMES DAILY
Qty: 14 CAPSULE | Refills: 0 | Status: SHIPPED | OUTPATIENT
Start: 2023-05-26 | End: 2023-06-02

## 2023-05-26 NOTE — PROGRESS NOTES
Chief Complaint   Patient presents with     UTI     Started this morning, had UTI 3x since 4/25-5/19, urgency, pelvic discomfort, blood in urine, no fever       ASSESSMENT/PLAN:  Mirta was seen today for uti.    Diagnoses and all orders for this visit:    Acute cystitis with hematuria  -     nitroFURantoin macrocrystal-monohydrate (MACROBID) 100 MG capsule; Take 1 capsule (100 mg) by mouth 2 times daily for 7 days    Dysuria  -     UA Macroscopic with reflex to Microscopic and Culture  -     Urine Microscopic Exam  -     Urine Culture    UA consistent with UTI.  Start Macrobid above.  Previous culture grew out 2 bacteria that were both sensitive to this medication.  Change accordingly to sensitivities    Gumaro Ventura PA-C      SUBJECTIVE:  Mirta is a 73 year old female who presents to urgent care with 1 day of dysuria, suprapubic abdominal cramping, urgency.  No fevers, chills, abdominal pain.  Usually gets UTI after sex.  Has been recurrent for her.    ROS: Pertinent ROS neg other than the symptoms noted above in the HPI.     OBJECTIVE:  BP (!) 160/79   Pulse 63   Resp 12   LMP  (LMP Unknown)   SpO2 100%    GENERAL: healthy, alert and no distress    DIAGNOSTICS    Results for orders placed or performed in visit on 05/26/23   UA Macroscopic with reflex to Microscopic and Culture     Status: Abnormal    Specimen: Urine, Clean Catch   Result Value Ref Range    Color Urine Yellow Colorless, Straw, Light Yellow, Yellow    Appearance Urine Cloudy (A) Clear    Glucose Urine Negative Negative mg/dL    Bilirubin Urine Negative Negative    Ketones Urine Negative Negative mg/dL    Specific Gravity Urine 1.025 1.005 - 1.030    Blood Urine Moderate (A) Negative    pH Urine 5.5 5.0 - 8.0    Protein Albumin Urine Trace (A) Negative mg/dL    Urobilinogen Urine 0.2 0.2, 1.0 E.U./dL    Nitrite Urine Negative Negative    Leukocyte Esterase Urine Large (A) Negative   Urine Microscopic Exam     Status: Abnormal   Result Value  Ref Range    Bacteria Urine Few (A) None Seen /HPF    RBC Urine 10-25 (A) 0-2 /HPF /HPF    WBC Urine  (A) 0-5 /HPF /HPF    Squamous Epithelials Urine None Seen None Seen /LPF        Current Outpatient Medications   Medication     acetaminophen (TYLENOL) 500 MG tablet     ascorbic acid, vitamin C, (VITAMIN C) 250 MG tablet     CALCIUM CARBONATE (CALCIUM 500 ORAL)     clobetasol (TEMOVATE) 0.05 % external cream     cranberry extract 300 mg Tab     estradiol (ESTRACE) 0.1 MG/GM vaginal cream     sulfamethoxazole-trimethoprim (BACTRIM DS) 800-160 MG tablet     No current facility-administered medications for this visit.      Patient Active Problem List   Diagnosis     Benign Polyps Of The Large Intestine     Hypercalciuria     Lichen Sclerosus Et Atrophicus     Lumbar herniated disc     Osteoporosis      Past Medical History:   Diagnosis Date     Colon cancer (H) 2005    benign polyp removed     Urinary tract infection      Past Surgical History:   Procedure Laterality Date     ZZHC EXCISION BRACH CLFT CYST,SUPERFICIAL      Description: Surgery Excis Of Branchial Cleft Cyst Confined Skin Subcut T;  Recorded: 08/18/2008;     Family History   Problem Relation Age of Onset     Alzheimer Disease Other      Osteoporosis Other      Social History     Tobacco Use     Smoking status: Never     Smokeless tobacco: Never   Vaping Use     Vaping status: Not on file   Substance Use Topics     Alcohol use: Not on file              The plan of care was discussed with the patient. They understand and agree with the course of treatment prescribed. A printed summary was given including instructions and medications.  The use of Dragon/[employer] dictation services may have been used to construct the content in this note; any grammatical or spelling errors are non-intentional. Please contact the author of this note directly if you are in need of any clarification.

## 2023-05-27 LAB — BACTERIA UR CULT: ABNORMAL

## 2023-06-07 ENCOUNTER — ANCILLARY PROCEDURE (OUTPATIENT)
Dept: MAMMOGRAPHY | Facility: CLINIC | Age: 73
End: 2023-06-07
Attending: FAMILY MEDICINE
Payer: MEDICARE

## 2023-06-07 DIAGNOSIS — R92.0 MICROCALCIFICATION OF BREAST: ICD-10-CM

## 2023-06-07 PROCEDURE — 77065 DX MAMMO INCL CAD UNI: CPT | Mod: RT

## 2023-06-28 ENCOUNTER — OFFICE VISIT (OUTPATIENT)
Dept: FAMILY MEDICINE | Facility: CLINIC | Age: 73
End: 2023-06-28
Payer: MEDICARE

## 2023-06-28 ENCOUNTER — TELEPHONE (OUTPATIENT)
Dept: FAMILY MEDICINE | Facility: CLINIC | Age: 73
End: 2023-06-28

## 2023-06-28 VITALS
DIASTOLIC BLOOD PRESSURE: 75 MMHG | TEMPERATURE: 97.5 F | HEIGHT: 69 IN | SYSTOLIC BLOOD PRESSURE: 146 MMHG | BODY MASS INDEX: 17.21 KG/M2 | RESPIRATION RATE: 16 BRPM | OXYGEN SATURATION: 99 % | WEIGHT: 116.2 LBS

## 2023-06-28 DIAGNOSIS — Z87.440 HISTORY OF RECURRENT UTI (URINARY TRACT INFECTION): Primary | ICD-10-CM

## 2023-06-28 LAB
ALBUMIN UR-MCNC: NEGATIVE MG/DL
APPEARANCE UR: CLEAR
BACTERIA #/AREA URNS HPF: ABNORMAL /HPF
BILIRUB UR QL STRIP: NEGATIVE
COLOR UR AUTO: YELLOW
GLUCOSE UR STRIP-MCNC: NEGATIVE MG/DL
HGB UR QL STRIP: NEGATIVE
KETONES UR STRIP-MCNC: NEGATIVE MG/DL
LEUKOCYTE ESTERASE UR QL STRIP: ABNORMAL
NITRATE UR QL: NEGATIVE
PH UR STRIP: 6.5 [PH] (ref 5–8)
RBC #/AREA URNS AUTO: ABNORMAL /HPF
SP GR UR STRIP: 1.01 (ref 1–1.03)
SQUAMOUS #/AREA URNS AUTO: ABNORMAL /LPF
UROBILINOGEN UR STRIP-ACNC: 0.2 E.U./DL
WBC #/AREA URNS AUTO: ABNORMAL /HPF

## 2023-06-28 PROCEDURE — 99213 OFFICE O/P EST LOW 20 MIN: CPT | Performed by: FAMILY MEDICINE

## 2023-06-28 PROCEDURE — 81001 URINALYSIS AUTO W/SCOPE: CPT | Performed by: FAMILY MEDICINE

## 2023-06-28 NOTE — TELEPHONE ENCOUNTER
Called and spoke with patient, relayed PCP recommendations. Patient is not interested in signing up for MyChart and will keep previously scheduled appointment for later today.    Catherine Montes RN

## 2023-06-28 NOTE — TELEPHONE ENCOUNTER
It looks like she was just on antibiotics so she definitely needs a urine sample.  We cannot order this without some type of visit.  It does not look like she has a MyChart account.  Therefore would recommend office visit.  Or she could set up her MyChart account and submit an E-visit.

## 2023-06-28 NOTE — TELEPHONE ENCOUNTER
Patient stated she woke up with a UTI in the middle of the night and took an old Macrobid pill. She wants to know if an order can be placed for her to have her urine tested. She does not want to come in because she had these so often, last one was 5-26-23. Please call back patient to let her know what can be done.

## 2023-06-28 NOTE — TELEPHONE ENCOUNTER
"Called and spoke with patient. She reports sudden onset of dysuria and urgency late last night. Denies any fevers. She did take 1 dose of a \"very old macrobid prescription\" and had some improvement in symptoms. Per chart review, this would be patient's 4th UTI in the past 2 months. She has started to see Urology and her next appointment with them is 7/19. Advised appointment and assisted in scheduling with PCP for today (6/28) at a 12:40 arrival time. Patient is requesting a note be sent to PCP asking if she really needs to come in for appointment or if she can just come in for a urine sample, please advise.    Catherine Montes RN    "

## 2023-06-28 NOTE — PROGRESS NOTES
Assessment/ Plan     1. History of recurrent UTI (urinary tract infection)  Mirta comes in today for concerns for possible UTI.  She is had some recurrent bladder infections and was most recently treated with amoxicillin by her urologist on the 20th of this month.  She just had symptoms last night of frequency and dysuria.  She took an old Macrobid tablet and symptoms are now just some urinary frequency.  Her urinalysis is normal today so would not recommend prescribing any further antibiotics.  She has close follow-up with her urologist in about 2 weeks.  In the meantime she will let us know if symptoms return.  Her urologist does want her to have a sample and culture with every UTI.  - UA Macroscopic with reflex to Microscopic and Culture  - UA Microscopic with Reflex to Culture        Subjective:      Mirta Faria is a 73 year old female who presents for possible UTI.  She has had several UTIs over the last several months.  She most recently was treated with amoxicillin by her urology office.  She states that last night she had symptoms in the middle of the night.  She had some dysuria and frequency.  She took a leftover Macrobid that she had from years ago and states that that helped her symptoms.  This morning she just has a little bit of urinary frequency.  She does not have dysuria or hematuria.  She is not had flank pain or abdominal pain.  She has not felt nauseous.  She is in the process of being worked up for recurrent UTIs and is following with urology.  She has close follow-up in a couple of weeks.    Relevant past medical, family, surgical, and social history reviewed with patient, unless noted in HPI, not pertinent for this visit.  Medications were discussed and reconciled.   Review of Systems   A 12 point comprehensive review of systems was negative except as noted.      Current Outpatient Medications   Medication Sig Dispense Refill     acetaminophen (TYLENOL) 500 MG tablet Take 1,000 mg by  "mouth       ascorbic acid, vitamin C, (VITAMIN C) 250 MG tablet [ASCORBIC ACID, VITAMIN C, (VITAMIN C) 250 MG TABLET] Take 250 mg by mouth daily.       CALCIUM CARBONATE (CALCIUM 500 ORAL) [CALCIUM CARBONATE (CALCIUM 500 ORAL)] Take by mouth.       clobetasol (TEMOVATE) 0.05 % external cream [CLOBETASOL (TEMOVATE) 0.05 % CREAM] APPLY SPARINGLY TO AFFECTED AREA(S) TWICE DAILY 15 g 1     cranberry extract 300 mg Tab [CRANBERRY EXTRACT 300 MG TAB] Take by mouth.       estradiol (ESTRACE) 0.1 MG/GM vaginal cream [ESTRADIOL (ESTRACE) 0.01 % (0.1 MG/GRAM) VAGINAL CREAM] 1 gm vaginally 3 times a week 42.5 g 2         Objective:     BP (!) 146/75   Temp 97.5  F (36.4  C)   Resp 16   Ht 1.74 m (5' 8.5\")   Wt 52.7 kg (116 lb 3.2 oz)   LMP  (LMP Unknown)   SpO2 99%   BMI 17.41 kg/m      Body mass index is 17.41 kg/m .       General appearance: alert, appears stated age and cooperative         Recent Results (from the past 168 hour(s))   UA Macroscopic with reflex to Microscopic and Culture    Specimen: Urine, Midstream   Result Value Ref Range    Color Urine Yellow Colorless, Straw, Light Yellow, Yellow    Appearance Urine Clear Clear    Glucose Urine Negative Negative mg/dL    Bilirubin Urine Negative Negative    Ketones Urine Negative Negative mg/dL    Specific Gravity Urine 1.010 1.005 - 1.030    Blood Urine Negative Negative    pH Urine 6.5 5.0 - 8.0    Protein Albumin Urine Negative Negative mg/dL    Urobilinogen Urine 0.2 0.2, 1.0 E.U./dL    Nitrite Urine Negative Negative    Leukocyte Esterase Urine Trace (A) Negative   UA Microscopic with Reflex to Culture   Result Value Ref Range    Bacteria Urine None Seen None Seen /HPF    RBC Urine None Seen 0-2 /HPF /HPF    WBC Urine 0-5 0-5 /HPF /HPF    Squamous Epithelials Urine Few (A) None Seen /LPF          This note has been dictated using voice recognition software. Any grammatical or context distortions are unintentional and inherent to the software  "

## 2023-07-28 NOTE — CONFIDENTIAL NOTE
RECORDS RECEIVED FROM: internal    DATE RECEIVED: 10.10.23   NOTES (FOR ALL VISITS) STATUS DETAILS   OFFICE NOTES from referring provider internal  Fernanda Haas MD     MEDICATION LIST internal     IMAGING      DEXASCAN internal  11.14.22     LABS     DIABETES: HBGA1C, CREATININE, FASTING LIPIDS, MICROALBUMIN URINE, POTASSIUM, TSH, T4    THYROID: TSH, T4, CBC, THYRODLONULIN, TOTAL T3, FREE T4, CALCITONIN, CEA Internal  CREATININE-6.23.23  Cbc- 4.14.23  Cmp- 4.14.23  TSH-4.14.23  Vitamin D- 4.14.23

## 2023-08-01 ENCOUNTER — TELEPHONE (OUTPATIENT)
Dept: FAMILY MEDICINE | Facility: CLINIC | Age: 73
End: 2023-08-01
Payer: MEDICARE

## 2023-08-01 NOTE — TELEPHONE ENCOUNTER
General Call    Contacts         Type Contact Phone/Fax    08/01/2023 03:18 PM CDT Phone (Incoming) Mirta Faria (Self) [phone] ()          Reason for Call: patient stated she had her prolia shot in February in [place].     What are your questions or concerns:  please contact her to get prolia shot scheduled    Date of last appointment with provider: 06-    Okay to leave a detailed message?: Yes at Home number on file [phone] (home) or Cell number on file:    Telephone Information:   Mobile [phone]

## 2023-08-03 ENCOUNTER — TELEPHONE (OUTPATIENT)
Dept: FAMILY MEDICINE | Facility: CLINIC | Age: 73
End: 2023-08-03
Payer: MEDICARE

## 2023-08-03 ENCOUNTER — TELEPHONE (OUTPATIENT)
Dept: NURSING | Facility: CLINIC | Age: 73
End: 2023-08-03
Payer: MEDICARE

## 2023-08-03 NOTE — TELEPHONE ENCOUNTER
Order/Referral Request    Who is requesting: Pt    Orders being requested: Prolia Injection    Reason service is needed/diagnosis: Pt gets 1 every 6 months and is due on 8/28 or 8/29    When are orders needed by: 8/28    Has this been discussed with Provider: Yes    Does patient have a preference on a Group/Provider/Facility? [facility] clinic    Does patient have an appointment scheduled?: No    Where to send orders: Place orders within Epic    Okay to leave a detailed message?: Yes at Cell number on file:    Telephone Information:   Mobile [phone]         8/28 or 8/29 not before would like to get it on ne of those

## 2023-08-03 NOTE — TELEPHONE ENCOUNTER
See other telephone encounter. Assisted patient with setting up appt with PCP and RN visit for prolia injection.    Leighton Cronin RN     [facility]

## 2023-08-03 NOTE — TELEPHONE ENCOUNTER
Called patient to discuss note below.  Patient is currently driving and will call back this afternoon.  Please transfer to nurse line so that we can assist with Prolia questions.    Leighton Cronin RN     [facility]

## 2023-08-03 NOTE — TELEPHONE ENCOUNTER
Called and talked with patient,    Assisted her with setting up an appt with PCP to discuss Prolia, as well as the RN visit later this month.    Leighton Cronin RN     [facility]

## 2023-08-03 NOTE — TELEPHONE ENCOUNTER
Pt calling wanting a Prolia injection. She was warm transferred to someone in scheduling who is able to further assist her.     Celine Richmond RN  [facility] Nurse Advisor   8/3/2023  3:06 PM

## 2023-08-09 ENCOUNTER — OFFICE VISIT (OUTPATIENT)
Dept: FAMILY MEDICINE | Facility: CLINIC | Age: 73
End: 2023-08-09
Payer: MEDICARE

## 2023-08-09 VITALS
OXYGEN SATURATION: 98 % | BODY MASS INDEX: 16.77 KG/M2 | DIASTOLIC BLOOD PRESSURE: 79 MMHG | RESPIRATION RATE: 16 BRPM | HEIGHT: 69 IN | SYSTOLIC BLOOD PRESSURE: 126 MMHG | WEIGHT: 113.2 LBS | HEART RATE: 86 BPM

## 2023-08-09 DIAGNOSIS — M81.0 AGE-RELATED OSTEOPOROSIS WITHOUT CURRENT PATHOLOGICAL FRACTURE: Primary | ICD-10-CM

## 2023-08-09 DIAGNOSIS — Z92.29 PERSONAL HISTORY OF OTHER DRUG THERAPY: ICD-10-CM

## 2023-08-09 PROCEDURE — 36415 COLL VENOUS BLD VENIPUNCTURE: CPT | Performed by: FAMILY MEDICINE

## 2023-08-09 PROCEDURE — 82310 ASSAY OF CALCIUM: CPT | Performed by: FAMILY MEDICINE

## 2023-08-09 PROCEDURE — 99213 OFFICE O/P EST LOW 20 MIN: CPT | Performed by: FAMILY MEDICINE

## 2023-08-09 RX ORDER — CIPROFLOXACIN 500 MG/1
500 TABLET, FILM COATED ORAL
COMMUNITY

## 2023-08-09 NOTE — PROGRESS NOTES
Assessment/ Plan     1. Age-related osteoporosis without current pathological fracture  Mirta presents to get Prolia injection.  Her primary and her endocrinologist are in [place] where she spends most of the year.  She is due for her 6-month injection.  Counseling was performed patient information was printed and given to patient.  Will check a calcium and creatinine level.  If these are normal she will return later this month for her injection  - Calcium; Future  - Creatinine; Future  - denosumab (PROLIA) injection 60 mg  - Calcium    2. Personal history of other drug therapy  She previously used alendronate and Reclast.  - Calcium; Future  - Creatinine; Future  - denosumab (PROLIA) injection 60 mg  - Calcium      Subjective:      Mirta Faria is a 73 year old female who presents for Prolia injection.  Her primary care physician and her endocrinologist are in [place].  She is here only through the summer but is due her for her Prolia injection this month.  She states she has been getting it for about 4 years now.  It sounds like she may be got a Reclast about a year ago.  She states in the distant past she had been on Fosamax for period of time.  She is up-to-date on her bone density.  She is good about calcium, vitamin D, and weightbearing exercise.  She has not had any side effects from the Prolia.    Relevant past medical, family, surgical, and social history reviewed with patient, unless noted in HPI, not pertinent for this visit.  Medications were discussed and reconciled.   Review of Systems   A 12 point comprehensive review of systems was negative except as noted.      Current Outpatient Medications   Medication Sig Dispense Refill    acetaminophen (TYLENOL) 500 MG tablet Take 1,000 mg by mouth      ascorbic acid, vitamin C, (VITAMIN C) 250 MG tablet [ASCORBIC ACID, VITAMIN C, (VITAMIN C) 250 MG TABLET] Take 250 mg by mouth daily.      CALCIUM CARBONATE (CALCIUM 500 ORAL) [CALCIUM CARBONATE (CALCIUM  "500 ORAL)] Take by mouth.      ciprofloxacin (CIPRO) 500 MG tablet Take 500 mg by mouth As needed for UTI - 3 day treatment      clobetasol (TEMOVATE) 0.05 % external cream [CLOBETASOL (TEMOVATE) 0.05 % CREAM] APPLY SPARINGLY TO AFFECTED AREA(S) TWICE DAILY 15 g 1    cranberry extract 300 mg Tab [CRANBERRY EXTRACT 300 MG TAB] Take by mouth.      estradiol (ESTRACE) 0.1 MG/GM vaginal cream [ESTRADIOL (ESTRACE) 0.01 % (0.1 MG/GRAM) VAGINAL CREAM] 1 gm vaginally 3 times a week 42.5 g 2         Objective:     /79   Pulse 86   Resp 16   Ht 1.74 m (5' 8.5\")   Wt 51.3 kg (113 lb 3.2 oz)   LMP  (LMP Unknown)   SpO2 98%   BMI 16.96 kg/m      Body mass index is 16.96 kg/m .       General appearance: alert, appears stated age and cooperative      No results found for this or any previous visit (from the past 168 hour(s)).       This note has been dictated using voice recognition software. Any grammatical or context distortions are unintentional and inherent to the software  "

## 2023-08-10 LAB — CALCIUM SERPL-MCNC: 9.7 MG/DL (ref 8.8–10.2)

## 2023-08-18 NOTE — CONFIDENTIAL NOTE
Clinic Administered Medication Documentation      Prolia Documentation    Indication: Prolia  (denosumab) is a prescription medicine used to treat osteoporosis in patients who:   Are at high risk for fracture, meaning patients who have had a fracture related to osteoporosis, or who have multiple risk factors for fracture.  Cannot use another osteoporosis medicine or other osteoporosis medicines did not work well.  The timeline for early/late injections would be 4 weeks early and any time after the 6 month jose luis. If a patient receives their injection late, then the subsequent injection would be 6 months from the date that they actually received the injection.    When was the last injection?  ***  Was the last injection at least 6 months ago? {WITHIN 6 MONTHS:796770}

## 2023-08-28 ENCOUNTER — ALLIED HEALTH/NURSE VISIT (OUTPATIENT)
Dept: FAMILY MEDICINE | Facility: CLINIC | Age: 73
End: 2023-08-28
Payer: MEDICARE

## 2023-08-28 DIAGNOSIS — M81.0 AGE-RELATED OSTEOPOROSIS WITHOUT CURRENT PATHOLOGICAL FRACTURE: Primary | ICD-10-CM

## 2023-08-28 PROCEDURE — 96372 THER/PROPH/DIAG INJ SC/IM: CPT | Performed by: FAMILY MEDICINE

## 2023-08-28 PROCEDURE — 99207 PR NO CHARGE NURSE ONLY: CPT

## 2023-08-28 NOTE — PROGRESS NOTES
Clinic Administered Medication Documentation      Prolia Documentation    Indication: Prolia  (denosumab) is a prescription medicine used to treat osteoporosis in patients who:   Are at high risk for fracture, meaning patients who have had a fracture related to osteoporosis, or who have multiple risk factors for fracture.  Cannot use another osteoporosis medicine or other osteoporosis medicines did not work well.  The timeline for early/late injections would be 4 weeks early and any time after the 6 month jose luis. If a patient receives their injection late, then the subsequent injection would be 6 months from the date that they actually received the injection.    When was the last injection?  23  Was the last injection at least 6 months ago? Yes  Has the prior authorization been completed?  Yes  Is there an active order (written within the past 365 days, with administrations remaining, not ) in the chart?  Yes  Patient denies any dental work involving the bone (e.g. tooth extraction or dental implants) in the past 4 weeks?  Yes  Patient denies plans for any dental work involving the bone (e.g. tooth extraction or dental implants) in the next 4 weeks? Yes    The following steps were completed to comply with the REMS program for Prolia:  Reviewed information in the Medication Guide and Patient Counseling Chart, including the serious risks of Prolia  and the symptoms of each risk.  Advised patient to seek prompt medical attention if they have signs or symptoms of any of the serious risks.  Provided each patient a copy of the Medication Guide and Patient Brochure.    Prior to injection, verified patient identity using patient's name and date of birth. Medication was administered. Please see MAR and medication order for additional information. Patient instructed to remain in clinic for 15 minutes and report any adverse reaction to staff immediately.    Vial/Syringe: Syringe  Was this medication supplied by the  patient? No  Verified that the patient has refills remaining in their prescription.

## 2023-09-18 ENCOUNTER — TELEPHONE (OUTPATIENT)
Dept: FAMILY MEDICINE | Facility: CLINIC | Age: 73
End: 2023-09-18
Payer: MEDICARE

## 2023-09-18 NOTE — TELEPHONE ENCOUNTER
General Call    Contacts         Type Contact Phone/Fax    09/18/2023 01:42 PM CDT Phone (Incoming) Mirta Faria JOSE MIGUEL (Self) [phone] (M)          Reason for Call:  Patient called in to have her last prolia record sent to her home. I did print this off and send it to her home VIA paper mail.     Date of last appointment with provider: 8-9-2023

## 2023-09-21 ENCOUNTER — TELEPHONE (OUTPATIENT)
Dept: FAMILY MEDICINE | Facility: CLINIC | Age: 73
End: 2023-09-21
Payer: MEDICARE

## 2023-09-21 DIAGNOSIS — R92.0 MICROCALCIFICATION OF BREAST: Primary | ICD-10-CM

## 2023-09-21 NOTE — TELEPHONE ENCOUNTER
Called patient and relayed information/instructions from provider. Patient has no further questions at this time and will follow up as needed/instructed.    Leighton Cronin RN     [facility]

## 2023-09-21 NOTE — TELEPHONE ENCOUNTER
General Call    Contacts         Type Contact Phone/Fax    09/21/2023 02:20 PM CDT Phone (Incoming) Mirta Faria (Self) [phone] ()          Reason for Call:  Patient calling in wanting an order placed so that she can have her next mammogram. Stated she had her last done in June this year and since last November they have wanted her to have one done every 6 months to see how things are looking. Please call back patient to tell her what can be done or if orders are placed.     Okay to leave a detailed message?: Yes at Home number on file [phone] (home)

## 2023-09-26 ENCOUNTER — TRANSFERRED RECORDS (OUTPATIENT)
Dept: HEALTH INFORMATION MANAGEMENT | Facility: CLINIC | Age: 73
End: 2023-09-26
Payer: MEDICARE

## 2023-10-10 ENCOUNTER — VIRTUAL VISIT (OUTPATIENT)
Dept: ENDOCRINOLOGY | Facility: CLINIC | Age: 73
End: 2023-10-10
Attending: FAMILY MEDICINE
Payer: MEDICARE

## 2023-10-10 ENCOUNTER — PRE VISIT (OUTPATIENT)
Dept: ENDOCRINOLOGY | Facility: CLINIC | Age: 73
End: 2023-10-10

## 2023-10-10 DIAGNOSIS — M81.0 OSTEOPOROSIS, UNSPECIFIED OSTEOPOROSIS TYPE, UNSPECIFIED PATHOLOGICAL FRACTURE PRESENCE: ICD-10-CM

## 2023-10-10 PROCEDURE — 99205 OFFICE O/P NEW HI 60 MIN: CPT | Mod: 95 | Performed by: STUDENT IN AN ORGANIZED HEALTH CARE EDUCATION/TRAINING PROGRAM

## 2023-10-10 NOTE — NURSING NOTE
Is the patient currently in the state of [place]? YES    Visit mode:VIDEO    If the visit is dropped, the patient can be reconnected by: VIDEO VISIT: Text to cell phone:   Telephone Information:   Mobile [phone]       Will anyone else be joining the visit? NO  (If patient encounters technical issues they should call [phone]    How would you like to obtain your AVS? MyChart    Are changes needed to the allergy or medication list? Pt stated no changes to allergies and Pt stated no med changes    Reason for visit: STAR German, SHARATH, VVF

## 2023-10-10 NOTE — PROGRESS NOTES
Endocrinology Clinic Visit 10/10/2023      Video-Visit Details    Type of service:  Video Visit  Joined the call at 10/10/2023, 9:12:32 am.  Left the call at 10/10/2023, 9:47:32 am.  Originating Location (pt. Location): Home        Distant Location (provider location):  Off-site    Mode of Communication:  Video Conference via [employer]    Physician has received verbal consent for a Video Visit from the patient? Yes    I spent a total of 60 minutes on the date of encounter reviewing medical records, evaluating the patient, coordinating care and documenting in the EHR, as detailed above.      NAME:  Mirta Faria  PCP:  Fernanda Haas  MRN:  [id number]  Reason for Consult:  osteoporosis   Requesting Provider:  Fernanda Haas    Chief Complaint     Chief Complaint   Patient presents with    RECHECK       History of Present Illness     Mirta Faria is a 73 year old female who is seen in video visit for osteoporosis    She was first diagnosed with OP in 2016, per her notes she was started on prolia 2/2020. Per chart review she was seen by Dr. Papi Bedolla 12/2016 with plan to start prolia. There is a note from yuri Martinez 12/23/2016 that she received prolia injection.She continued on prolia every 6 month, had prolia on 2/2023 and last one on 8/2023 those were done in [place]. She said most of her osteoporosis care was completed in [place]. She had IV reclast on 8/2021 ordered by Dr Papi Bedolla with plan to stop prolia.    Her PMH is otherwise significant for 2 hip replacement due to OA. Her problem list report hypercalciuria; she had a 24 hour urine of calcium on 9/2020 was 269 mg. She was on chlorthalidone in the past.    I reviewed her Dexa scan in records: she had one in 2007 but I am unable to see it.  DEXA scan in 2016 (only lumbar area is scanned), lowest T score of -2.6.  DEXA scan in 2018, lowest T score of -2.1 at the lumbar spine, there was significant 6.7% increase in the bone density at the spine  and 4.2% increase at the left total hip compared to 2016.  DEXA scan in November 2020 showed lowest T score of -1.7 at the lumbar spine compared to 2018 that has been 4.5% increase in the bone density at the spine and 8.9% increase at the left total hip.  Most recent DEXA scan on November 2022 showed a T score of -2 at the lumbar spine.  Radius was done for the first time which showed a T score of -3.1.  There has been 3.5% decrease in the lumbar spine.      Calcium intake:   Dietary: 2 glasses milk daily, 1 serving of yogurt everyday, cheese frequently  Supplements: 600 mg 1 tab daily    Vitamin D intake:   Supplements: 80 mcg daily  Last vitamin D level was 30 on August 2020.    Osteoporosis Risk factors:   Previous fractures: none in adulthood. Arm fx at age of 14 while horseback riding.  Family history of fragility fracture in parent: no  Current smoking: no  Steroid Use: no  Rheumatoid  arthritis: no  Alcohol (3 or more units per day): no, every other day one glass of wine  Reported loss of height: no  Menstrual history: age at menopause: early 61  Estrogen use after menopause: no  Tendency for falls: no  GI history: Malabsorption (IBD, Celiac, gastric bypass ): no  History of kidney stones: no  History of thyroid disease: no  Physical activity: walking frequently  Weight history: lost 12 lbs over the years.        Problem List     Patient Active Problem List   Diagnosis    Benign Polyps Of The Large Intestine    Hypercalciuria    Lichen Sclerosus Et Atrophicus    Lumbar herniated disc    Osteoporosis        Medications     Current Outpatient Medications   Medication    acetaminophen (TYLENOL) 500 MG tablet    ascorbic acid, vitamin C, (VITAMIN C) 250 MG tablet    CALCIUM CARBONATE (CALCIUM 500 ORAL)    ciprofloxacin (CIPRO) 500 MG tablet    clobetasol (TEMOVATE) 0.05 % external cream    cranberry extract 300 mg Tab    estradiol (ESTRACE) 0.1 MG/GM vaginal cream     Current Facility-Administered Medications    Medication    denosumab (PROLIA) injection 60 mg        Allergies     Allergies   Allergen Reactions    Tramadol Nausea       Medical / Surgical History     Past Medical History:   Diagnosis Date    Colon cancer (H) 2005    benign polyp removed    Urinary tract infection      Past Surgical History:   Procedure Laterality Date    ZZHC EXCISION BRACH CLFT CYST,SUPERFICIAL      Description: Surgery Excis Of Branchial Cleft Cyst Confined Skin Subcut T;  Recorded: 08/18/2008;       Social History     Social History     Socioeconomic History    Marital status:      Spouse name: Not on file    Number of children: Not on file    Years of education: Not on file    Highest education level: Not on file   Occupational History    Not on file   Tobacco Use    Smoking status: Never    Smokeless tobacco: Never   Vaping Use    Vaping Use: Never used   Substance and Sexual Activity    Alcohol use: Not on file    Drug use: Not on file    Sexual activity: Not on file   Other Topics Concern    Not on file   Social History Narrative    Not on file     Social Determinants of Health     Financial Resource Strain: Not on file   Food Insecurity: Not on file   Transportation Needs: Not on file   Physical Activity: Not on file   Stress: Not on file   Social Connections: Not on file   Interpersonal Safety: Not on file   Housing Stability: Not on file       Family History     Family History   Problem Relation Age of Onset    Alzheimer Disease Other     Osteoporosis Other        ROS     12 ROS completed, pertinent positive and negative in HPI    Physical Exam   LMP  (LMP Unknown)    GENERAL: Healthy, alert and no distress  EYES: Eyes grossly normal to inspection.  No discharge or erythema, or obvious scleral/conjunctival abnormalities.  RESP: No audible wheeze, cough, or visible cyanosis.  No visible retractions or increased work of breathing.    SKIN: Visible skin clear. No significant rash, abnormal pigmentation or lesions.  NEURO:  "Cranial nerves grossly intact.  Mentation and speech appropriate for age.  PSYCH: Mentation appears normal, affect normal/bright, judgement and insight intact, normal speech and appearance well-groomed.     Labs/Imaging     Pertinent Labs were reviewed and updated in EPIC and discussed briefly.  Radiology Results were  reviewed and updated in EPIC and discussed briefly.    Summary of recent findings:   No results found for: A1C    No results found for: \"TSH\", \"T4\"    Creatinine   Date Value Ref Range Status   07/06/2022 0.71 0.51 - 0.95 mg/dL Final       Recent Labs   Lab Test 12/27/17  0937   CHOL 225*   HDL 90      TRIG 46       No results found for: \"KMZE21MLAGR\", \"[id number]\", \"[id number]\"    I personally reviewed the patient's outside records from [facility] EMR and Care Everywhere. Summary of pertinent findings in [facility].    Impression / Plan     1.  Osteoporosis  Her risk factors for osteoporosis include age and postmenopausal.  She had a fragmented care between [place] and [place], it seems that there has been a miscommunication between providers.  She received Prolia injection probably since 2016, with a plan for it to be stopped in 2021, she received IV Reclast on August 2021 but continued with Prolia injection in [place] and her most recent injection was August 2023.  Her most recent DEXA scan in November 2021 included the radius which was not done previously and showed a T score of -3.  We discussed completing work-up for secondary osteoporosis as below.  Based on work-up below we will decide on next step and treatment.  We reviewed adequate calcium and vitamin D.  Fall precautions.      Test and/or medications prescribed today:  Orders Placed This Encounter   Procedures    25 Hydroxyvitamin D2 and D3    Albumin level    Calcium    Magnesium    Parathyroid Hormone Intact    Phosphorus    Creatinine    N telopeptide cross linked urine    [facility]; N Telopeptide Serum (Ntx) (Laboratory Miscellaneous Order) "    C-Telopeptide, Beta-Cross-Linked         Follow up: 4-month      Abilio Trujillo MD  Endocrinology, Diabetes and Metabolism  [facility]     no

## 2023-10-18 ENCOUNTER — TELEPHONE (OUTPATIENT)
Dept: ENDOCRINOLOGY | Facility: CLINIC | Age: 73
End: 2023-10-18
Payer: MEDICARE

## 2023-11-29 ENCOUNTER — ANCILLARY PROCEDURE (OUTPATIENT)
Dept: MAMMOGRAPHY | Facility: CLINIC | Age: 73
End: 2023-11-29
Attending: FAMILY MEDICINE
Payer: MEDICARE

## 2023-11-29 DIAGNOSIS — R92.0 MICROCALCIFICATION OF BREAST: ICD-10-CM

## 2023-11-29 PROCEDURE — 77062 BREAST TOMOSYNTHESIS BI: CPT

## 2024-01-12 ENCOUNTER — TELEPHONE (OUTPATIENT)
Dept: ENDOCRINOLOGY | Facility: CLINIC | Age: 74
End: 2024-01-12
Payer: MEDICARE

## 2024-01-12 NOTE — TELEPHONE ENCOUNTER
01/12/24 Writer called and spoke to pt. Pt is confused, she thought the lab orders sent to her are the results. Pt is in [place] right and will get labs done. Fax number [phone] was given to pt to have results fax back to [facility].    Pt has a question on scan done and would like a RN to call back.    Estevan Dunaway -General Care Navigator

## 2024-01-12 NOTE — TELEPHONE ENCOUNTER
Spoke w/ Pt: Appt with Dr Trujillo.   In [place] until end of April. Appt May 15 2024.   Pt confirms understanding of lab draw at local lab and will have results forwarded to our clinic.   Tuyet Vela RN on 1/12/2024 at 11:44 AM

## 2024-01-12 NOTE — TELEPHONE ENCOUNTER
[facility] Call Center    Phone Message    May a detailed message be left on voicemail: yes     Reason for Call: Other: Patient would like a call back to discuss action plan following last fall's visit and test results.     Patient would also like to discuss a possible referral to a provider in [place] (she hansen in [place])      Action Taken: Other: endo    Travel Screening: Not Applicable

## 2024-02-05 ENCOUNTER — TELEPHONE (OUTPATIENT)
Dept: ENDOCRINOLOGY | Facility: CLINIC | Age: 74
End: 2024-02-05
Payer: MEDICARE

## 2024-02-05 NOTE — TELEPHONE ENCOUNTER
[facility] Call Center    Phone Message    May a detailed message be left on voicemail: yes     Reason for Call: Other: Per pt would like to know what her plan of care is until she gets seen on May 1 2024. Per pt is currently in [place] and would like to know if she should get her prolia injection done in [place] with her PCP or should she wait until she is see by  in May? Per pt prolia injection is due end of Feb early March. Pt last had it in August 2023.Per pt would like to know  recommendation. Please call pt back.       Action Taken: Message routed to:  [facility] Endo    Travel Screening: Not Applicable

## 2024-02-12 NOTE — TELEPHONE ENCOUNTER
Message left for patient to contact her PCP in [place] to order the Prolia and labs to go with it to be done there .  Dr Martinez doesn't order for [place] . Whit Santos RN on 2/12/2024 at 11:53 AM

## 2024-02-13 NOTE — TELEPHONE ENCOUNTER
I had left her a VM the other day that she didn't get. She is to have her PCP order her Prolia and labs there  and then just keep her 5/1/ 24 visit here with Dr Trujillo  when she returns. . I did relay the message this time as she answered when I called back . Whit Santos RN on 2/13/2024 at 2:26 PM

## 2024-02-13 NOTE — TELEPHONE ENCOUNTER
Pt called again, requesting call back. Pt stated she is needing to know the next steps in her treatment/care

## 2024-03-06 ENCOUNTER — TELEPHONE (OUTPATIENT)
Dept: ENDOCRINOLOGY | Facility: CLINIC | Age: 74
End: 2024-03-06
Payer: MEDICARE

## 2024-03-06 NOTE — TELEPHONE ENCOUNTER
[facility] Call Center    Phone Message    May a detailed message be left on voicemail: yes     Reason for Call: Patient calling to talk to member of the care team about her upcoming appt and discuss what is going on down in [place] with her treatment for Osteoporosis. Please call to discuss.       Action Taken: Message routed to:  [facility] Endo    Travel Screening: Not Applicable

## 2024-03-06 NOTE — TELEPHONE ENCOUNTER
Mirta will be getting prolia in [place] and will reschedule to see Dr Trujillo in 10/2024 . She cancelled the  5/2024 visit  and will see Dr Trujillo once yearly .Whit Santos RN on 3/6/2024 at 11:15 AM  ,.

## 2024-04-17 ENCOUNTER — TELEPHONE (OUTPATIENT)
Dept: ENDOCRINOLOGY | Facility: CLINIC | Age: 74
End: 2024-04-17
Payer: MEDICARE

## 2024-04-17 NOTE — TELEPHONE ENCOUNTER
Called and left a detailed message (with lab scheduling phone number) for patient to get labs done prior to her appt on May 1st.  Farida Ennis

## 2024-04-17 NOTE — PROGRESS NOTES
04/17/24 10:29 AM  PATIENT LAB/IMAGING STATUS : Left voicemail to complete standing/future orders  Farida Ennis   04/30/24 10:55 AM  PATIENT LAB/IMAGING STATUS : Orders completed / No further action needed on 4/25/24.  Farida Ennis

## 2024-04-22 ENCOUNTER — TELEPHONE (OUTPATIENT)
Dept: FAMILY MEDICINE | Facility: CLINIC | Age: 74
End: 2024-04-22
Payer: MEDICARE

## 2024-04-22 NOTE — TELEPHONE ENCOUNTER
General Call    Contacts         Type Contact Phone/Fax    04/22/2024 04:16 PM CDT Phone (Incoming) Mirta Faria JOSE MIGUEL (Self) [phone] (M)          Reason for Call:  patient called in to see if Dr. Neely put in test orders. I told her what orders were placed and when and she had no further questions and thanked me for the help. She will be in on Thursday for endocrinology labs.     Okay to leave a detailed message?: Yes at Cell number on file:    Telephone Information:   Mobile [phone]

## 2024-04-25 ENCOUNTER — LAB (OUTPATIENT)
Dept: LAB | Facility: CLINIC | Age: 74
End: 2024-04-25
Payer: MEDICARE

## 2024-04-25 DIAGNOSIS — M81.0 OSTEOPOROSIS, UNSPECIFIED OSTEOPOROSIS TYPE, UNSPECIFIED PATHOLOGICAL FRACTURE PRESENCE: ICD-10-CM

## 2024-04-25 PROCEDURE — 82523 COLLAGEN CROSSLINKS: CPT | Mod: 90

## 2024-04-25 PROCEDURE — 83735 ASSAY OF MAGNESIUM: CPT

## 2024-04-25 PROCEDURE — 36415 COLL VENOUS BLD VENIPUNCTURE: CPT

## 2024-04-25 PROCEDURE — 99000 SPECIMEN HANDLING OFFICE-LAB: CPT

## 2024-04-25 PROCEDURE — 82306 VITAMIN D 25 HYDROXY: CPT

## 2024-04-25 PROCEDURE — 82310 ASSAY OF CALCIUM: CPT

## 2024-04-25 PROCEDURE — 83970 ASSAY OF PARATHORMONE: CPT

## 2024-04-25 PROCEDURE — 84100 ASSAY OF PHOSPHORUS: CPT

## 2024-04-25 PROCEDURE — 82040 ASSAY OF SERUM ALBUMIN: CPT

## 2024-04-25 PROCEDURE — 82565 ASSAY OF CREATININE: CPT

## 2024-04-26 LAB
ALBUMIN SERPL BCG-MCNC: 4.6 G/DL (ref 3.5–5.2)
CALCIUM SERPL-MCNC: 9.5 MG/DL (ref 8.8–10.2)
COLLAGEN CTX SERPL-MCNC: 168 PG/ML
COLLAGEN NTX/CREAT UR-SRTO: 16
CREAT SERPL-MCNC: 1.04 MG/DL (ref 0.51–0.95)
CREAT UR-MCNC: 69 MG/DL
EGFRCR SERPLBLD CKD-EPI 2021: 57 ML/MIN/1.73M2
MAGNESIUM SERPL-MCNC: 2 MG/DL (ref 1.7–2.3)
PHOSPHATE SERPL-MCNC: 2.6 MG/DL (ref 2.5–4.5)
PTH-INTACT SERPL-MCNC: 30 PG/ML (ref 15–65)

## 2024-04-27 LAB
DEPRECATED CALCIDIOL+CALCIFEROL SERPL-MC: <80 UG/L (ref 20–75)
VITAMIN D2 SERPL-MCNC: <5 UG/L
VITAMIN D3 SERPL-MCNC: 75 UG/L

## 2024-04-30 LAB — COLLAGEN NTX SER-SCNC: 12.3 NM BCE

## 2024-05-01 ENCOUNTER — OFFICE VISIT (OUTPATIENT)
Dept: ENDOCRINOLOGY | Facility: CLINIC | Age: 74
End: 2024-05-01
Payer: MEDICARE

## 2024-05-01 VITALS
BODY MASS INDEX: 16.88 KG/M2 | WEIGHT: 114 LBS | DIASTOLIC BLOOD PRESSURE: 83 MMHG | HEIGHT: 69 IN | HEART RATE: 76 BPM | SYSTOLIC BLOOD PRESSURE: 147 MMHG | OXYGEN SATURATION: 98 %

## 2024-05-01 DIAGNOSIS — M81.0 AGE-RELATED OSTEOPOROSIS WITHOUT CURRENT PATHOLOGICAL FRACTURE: Primary | ICD-10-CM

## 2024-05-01 PROCEDURE — G2211 COMPLEX E/M VISIT ADD ON: HCPCS | Performed by: STUDENT IN AN ORGANIZED HEALTH CARE EDUCATION/TRAINING PROGRAM

## 2024-05-01 PROCEDURE — 99215 OFFICE O/P EST HI 40 MIN: CPT | Performed by: STUDENT IN AN ORGANIZED HEALTH CARE EDUCATION/TRAINING PROGRAM

## 2024-05-01 RX ORDER — ALBUTEROL SULFATE 90 UG/1
1-2 AEROSOL, METERED RESPIRATORY (INHALATION)
Status: CANCELLED
Start: 2024-05-08

## 2024-05-01 RX ORDER — ALBUTEROL SULFATE 0.83 MG/ML
2.5 SOLUTION RESPIRATORY (INHALATION)
Status: CANCELLED | OUTPATIENT
Start: 2024-05-08

## 2024-05-01 RX ORDER — DIPHENHYDRAMINE HYDROCHLORIDE 50 MG/ML
50 INJECTION INTRAMUSCULAR; INTRAVENOUS
Status: CANCELLED
Start: 2024-05-08

## 2024-05-01 RX ORDER — METHYLPREDNISOLONE SODIUM SUCCINATE 125 MG/2ML
125 INJECTION, POWDER, LYOPHILIZED, FOR SOLUTION INTRAMUSCULAR; INTRAVENOUS
Status: CANCELLED
Start: 2024-05-08

## 2024-05-01 RX ORDER — HEPARIN SODIUM,PORCINE 10 UNIT/ML
5-20 VIAL (ML) INTRAVENOUS DAILY PRN
Status: CANCELLED | OUTPATIENT
Start: 2024-05-08

## 2024-05-01 RX ORDER — HEPARIN SODIUM (PORCINE) LOCK FLUSH IV SOLN 100 UNIT/ML 100 UNIT/ML
5 SOLUTION INTRAVENOUS
Status: CANCELLED | OUTPATIENT
Start: 2024-05-08

## 2024-05-01 RX ORDER — MEPERIDINE HYDROCHLORIDE 25 MG/ML
25 INJECTION INTRAMUSCULAR; INTRAVENOUS; SUBCUTANEOUS EVERY 30 MIN PRN
Status: CANCELLED | OUTPATIENT
Start: 2024-05-08

## 2024-05-01 RX ORDER — ZOLEDRONIC ACID 5 MG/100ML
5 INJECTION, SOLUTION INTRAVENOUS ONCE
Status: CANCELLED
Start: 2024-05-08

## 2024-05-01 RX ORDER — ACETAMINOPHEN 325 MG/1
650 TABLET ORAL
Status: CANCELLED | OUTPATIENT
Start: 2024-05-08

## 2024-05-01 RX ORDER — EPINEPHRINE 1 MG/ML
0.3 INJECTION, SOLUTION, CONCENTRATE INTRAVENOUS EVERY 5 MIN PRN
Status: CANCELLED | OUTPATIENT
Start: 2024-05-08

## 2024-05-01 ASSESSMENT — PAIN SCALES - GENERAL: PAINLEVEL: NO PAIN (0)

## 2024-05-01 NOTE — PROGRESS NOTES
Endocrinology Clinic Visit         NAME:  Mirta Faria  PCP:  Fernanda Haas  MRN:  [id number]  Reason for Consult:  osteoporosis   Requesting Provider:  Fernanda Haas    Chief Complaint     Chief Complaint   Patient presents with    Endocrine Problem     Re-check osteoporosis       History of Present Illness     Mirta Faria is a 73 year old female who is seen in clinic for osteoporosis. She is here with her . Last seen 10/2023    She was first diagnosed with OP in 2016, per her notes she was started on prolia 2/2018 ( corrected from my previous note). Per chart review she was seen by Dr. Papi Bedolla 12/2016 with plan to start prolia. There is a note from yuri Martinez 12/23/2016 that she received prolia injection.She continued on prolia every 6 month, had prolia on 2/2023 and last one on 8/2023 those were done in [place]. She said most of her osteoporosis care was completed in [place]. She had IV reclast on 8/2021 ordered by Dr Papi Bedolla with plan to stop prolia.    Her PMH is otherwise significant for 2 hip replacement due to OA. Her problem list report hypercalciuria; she had a 24 hour urine of calcium on 9/2020 was 269 mg. She was on chlorthalidone in the past.    I reviewed her Dexa scan in records: she had one in 2007 but I am unable to see it.  DEXA scan in 2016 (only lumbar area is scanned), lowest T score of -2.6.  DEXA scan in 2018, lowest T score of -2.1 at the lumbar spine, there was significant 6.7% increase in the bone density at the spine and 4.2% increase at the left total hip compared to 2016.  DEXA scan in November 2020 showed lowest T score of -1.7 at the lumbar spine compared to 2018 that has been 4.5% increase in the bone density at the spine and 8.9% increase at the left total hip.  Most recent DEXA scan on November 2022 showed a T score of -2 at the lumbar spine.  Radius was done for the first time which showed a T score of -3.1.  There has been 3.5% decrease in the lumbar  spine. SHE IS S/P Rt and Lt hip replacement for OA, unable to complete dexa on those sites.    She was supposed to receive Prolia 2/2024 but she missed; she was in [place].    Calcium intake:   Dietary: 2 glasses milk daily, 1 serving of yogurt everyday, cheese frequently  Supplements: 600 mg 1 tab daily    Vitamin D intake:   Supplements: 80 mcg daily  Last vitamin D level was 75 on 4/25/24.    Osteoporosis Risk factors:   Previous fractures: none in adulthood. Arm fx at age of 14 while horseback riding.  Family history of fragility fracture in parent: no  Current smoking: no  Steroid Use: no  Rheumatoid  arthritis: no  Alcohol (3 or more units per day): no, every other day one glass of wine  Reported loss of height: no  Menstrual history: age at menopause: early 61  Estrogen use after menopause: no  Tendency for falls: no  GI history: Malabsorption (IBD, Celiac, gastric bypass ): no  History of kidney stones: no  History of thyroid disease: no  Physical activity: walking frequently  Weight history: lost 12 lbs over the years.        Problem List     Patient Active Problem List   Diagnosis    Benign Polyps Of The Large Intestine    Hypercalciuria    Lichen Sclerosus Et Atrophicus    Lumbar herniated disc    Osteoporosis        Medications     Current Outpatient Medications   Medication Sig Dispense Refill    acetaminophen (TYLENOL) 500 MG tablet Take 1,000 mg by mouth      ascorbic acid, vitamin C, (VITAMIN C) 250 MG tablet [ASCORBIC ACID, VITAMIN C, (VITAMIN C) 250 MG TABLET] Take 250 mg by mouth daily.      CALCIUM CARBONATE (CALCIUM 500 ORAL) [CALCIUM CARBONATE (CALCIUM 500 ORAL)] Take by mouth.      ciprofloxacin (CIPRO) 500 MG tablet Take 500 mg by mouth As needed for UTI - 3 day treatment      clobetasol (TEMOVATE) 0.05 % external cream [CLOBETASOL (TEMOVATE) 0.05 % CREAM] APPLY SPARINGLY TO AFFECTED AREA(S) TWICE DAILY 15 g 1    cranberry extract 300 mg Tab [CRANBERRY EXTRACT 300 MG TAB] Take by mouth.       estradiol (ESTRACE) 0.1 MG/GM vaginal cream [ESTRADIOL (ESTRACE) 0.01 % (0.1 MG/GRAM) VAGINAL CREAM] 1 gm vaginally 3 times a week 42.5 g 2     Current Facility-Administered Medications   Medication Dose Route Frequency Provider Last Rate Last Admin    denosumab (PROLIA) injection 60 mg  60 mg Subcutaneous Q6 Months Fernanda Haas MD   60 mg at 08/28/23 1305        Allergies     Allergies   Allergen Reactions    Tramadol Nausea       Medical / Surgical History     Past Medical History:   Diagnosis Date    Colon cancer (H) 2005    benign polyp removed    Urinary tract infection      Past Surgical History:   Procedure Laterality Date    ZZHC EXCISION BRACH CLFT CYST,SUPERFICIAL      Description: Surgery Excis Of Branchial Cleft Cyst Confined Skin Subcut T;  Recorded: 08/18/2008;       Social History     Social History     Socioeconomic History    Marital status:      Spouse name: Not on file    Number of children: Not on file    Years of education: Not on file    Highest education level: Not on file   Occupational History    Not on file   Tobacco Use    Smoking status: Never    Smokeless tobacco: Never   Vaping Use    Vaping status: Never Used   Substance and Sexual Activity    Alcohol use: Not on file    Drug use: Not on file    Sexual activity: Not on file   Other Topics Concern    Not on file   Social History Narrative    Not on file     Social Determinants of Health     Financial Resource Strain: Not on file   Food Insecurity: Not on file   Transportation Needs: Not on file   Physical Activity: Not on file   Stress: Not on file   Social Connections: Not on file   Interpersonal Safety: Not on file   Housing Stability: Not on file       Family History     Family History   Problem Relation Age of Onset    Alzheimer Disease Other     Osteoporosis Other        ROS     12 ROS completed, pertinent positive and negative in HPI    Physical Exam   BP (!) 147/83 (BP Location: Right arm)   Pulse 76   Ht 1.755 m (5'  "9.09\")   Wt 51.7 kg (114 lb)   LMP  (LMP Unknown)   SpO2 98%   BMI 16.79 kg/m     GENERAL: Healthy, alert and no distress  EYES: Eyes grossly normal to inspection.  No discharge or erythema, or obvious scleral/conjunctival abnormalities.  RESP: No audible wheeze, cough, or visible cyanosis.  No visible retractions or increased work of breathing.    SKIN: Visible skin clear. No significant rash, abnormal pigmentation or lesions.  NEURO: Cranial nerves grossly intact.  Mentation and speech appropriate for age.  PSYCH: Mentation appears normal, affect normal/bright, judgement and insight intact, normal speech and appearance well-groomed.     Labs/Imaging     Pertinent Labs were reviewed and updated in EPIC and discussed briefly.  Radiology Results were  reviewed and updated in EPIC and discussed briefly.    Summary of recent findings:   No results found for: A1C    No results found for: \"TSH\", \"T4\"    Creatinine   Date Value Ref Range Status   04/25/2024 1.04 (H) 0.51 - 0.95 mg/dL Final       Recent Labs   Lab Test 12/27/17  0937   CHOL 225*   HDL 90      TRIG 46       No results found for: \"IEKN71RQNHA\", \"[id number]\", \"[id number]\"    I personally reviewed the patient's outside records from [facility] EMR and Care Everywhere. Summary of pertinent findings in HPI.    Impression / Plan     1.  Osteoporosis  Her risk factors for osteoporosis include age and postmenopausal.  She had a fragmented care between [place] and [place], it seems that there has been a miscommunication between providers.  She received Prolia injection probably since 2016, with a plan for it to be stopped in 2021, she received IV Reclast on August 2021 but continued with Prolia injection in [place] and her most recent injection was August 2023.  Her most recent DEXA scan in November 2021 included the radius which was not done previously and showed a T score of -3.  Her work up for secondary OP was unremarkable. Her bone turnover markers are " suppressed. She never had a fragility fracture and with her fragmented care I am worried about the use of anabolic. We reviewed the data about vertebral fractures with delayed prolia injections. Given her interrupted prolia therapy, I think it is best to treat with a dose of IV reclast now then drug holiday.     Discussed 1/3 risk of flu symptoms (acute phase reaction) after treatment with IV zoledronic acid. Discussed risks of osteonecrosis of the jaw (1/200 after tooth extraction, 1/2500 spontaneous) and atypical femur fractures (1/1000) with chronic bisphosphonates. For every atypical femur fracture, 100 typical femur fractures are prevented.    Next dexa 11/2024.      Test and/or medications prescribed today:  Orders Placed This Encounter   Procedures    DX Bone Density         Follow up: 1 year  40 minutes spent on the date of the encounter doing chart review, history and exam, documentation and further activities as noted above.       The longitudinal plan of care for the diagnosis(es)/condition(s) as documented were addressed during this visit. Due to the added complexity in care, I will continue to support Mirta in the subsequent management and with ongoing continuity of care.    Abilio Trujillo MD  Endocrinology, Diabetes and Metabolism  [facility]

## 2024-05-01 NOTE — PATIENT INSTRUCTIONS
Please reach out to the following centers to schedule your appointment:       Imaging (DEXA, CT, MRI, XRAY)    [facility] ([facility] [place] [place]) [phone]   [facility] ([place], [place], [place]) [phone]   [facility] ([place], [place]) [phone]   [facility] ([facility]) [phone]       [facility] [phone]   [facility] [phone]   [place] [phone]   [facility]  [phone]   [facility] [phone]   [place] [phone]   [place] ([facility]) [phone]   [facility] Walk[facility]   [place] [phone]   [place] [phone]   [place] [phone]   [place] [phone]       Infusion    [facility] [phone]   [place] [phone]   [place] [phone]   [place] [phone]   [place] [phone]   [place] [phone]   [facility] [phone]     For any questions, please reach out to the Endocrinology Clinic Number for assistance: [phone].     Welcome to the [facility] Endocrinology and Diabetes Clinics     Our Endocrinology Clinics are here to provide you with a team-based, collaborative approach in the diagnosis and treatment of patients with diabetes and endocrine disorders. The team is made up of Physicians, Physician Assistants, Certified Diabetes Educators, Registered Nurses, Medical Assistants, Emergency Medical Technicians, and many others, all of whom have the unified goal of providing our patients with high quality care.     Please see below for some helpful tips to best navigate and use the [facility] Endocrinology clinic:     [place] Respect: At [facility], we are committed to a respectful and safe space for all patients, visitors, and staff.  We believe that mutual respect between patients and their care team is the foundation of quality care.  It is our expectation that you will be treated with respect by your care team.  In turn, we ask that all communication with the care team  (written and verbal) be respectful and free from profanity, threatening, or abusive language.  Disrespectful communication undermines our therapeutic relationship with you and may result in us being unable to continue to provide your care.    Refills: A provider must see you at least annually to prescribe and refill medications. This is to ensure your safety as well as meet insurance and compliance regulations.    Scheduling: Many of our Providers offer both in-person or video visits. Please call to schedule any needed follow ups as soon as possible because our provider schedules fill up very quickly. Our care team has the right to require an in-person visit when they believe that it is medically necessary. Please remember that for any virtual visits, you must be in the [facility] at the time of the visit, otherwise we are unable to see you and you will need to be rescheduled.    Missed Appointments: If you need to cancel or miss your scheduled appointment, please call the clinic at [phone] to reschedule.  Please note if you repeatedly miss appointments or repeatedly miss appointments without calling to inform us ahead of time (no-show), the clinic may elect to not allow you to reschedule without speaking to a manager, may require a Partnership In Care Agreement prior to rescheduling, or could result in you no longer being able to receive care from the clinic. Providing the clinic with timely notification if you have to miss an appointment, allows us to better serve the needs of all of our patients.    Primary Care Provider: Our Endocrinologists are Specialists in their field. We expect you to have a Primary Care Provider established to handle any needs outside of your diabetes and endocrine care.  We would be happy to assist you find a Primary Care Provider, if you do not have one.    [employer]hart: [employer] is a wonderful resource that allows you access to your Care Team via online or the timoteo. Please ask a  member of the team if you would like help creating an account. Please note that it may take up to 2 business days for a response. [employer] messages are not reviewed on weekends or after business hours.  Emergent or urgent care needs should never be communicated via [employer].  If you experience a medical emergency call 911 or go to the nearest emergency room.    Labs: It is recommended that you stay within the [facility] for labs but you are welcome to obtain ordered labs (with some exceptions) from any location of your choice as long as they are able to complete and process the needed labs. If you need us to fax orders to your preferred lab, please provide us the name and fax number of the lab you would like to go to so we can fax the orders. If your labs are drawn outside of the [facility], please have them fax the results to [phone] ([place]) or [phone] ([place]) or via [facility]. It is your responsibility to ensure that outside lab results are sent to us.    We look forward to working with you. Please do not hesitate to reach out with any questions.    Thank you,    The Endocrine Team    [facility] Address:   [place] Address:     [address] [place] [address]    Phone: [phone]  Fax: [phone] [address] [place] [address]    Phone: [phone]  Fax: [phone]     [facility] Cost Estimate Phone Number: [phone]    General Lab and Imaging Scheduling Phone Number: [phone]

## 2024-05-01 NOTE — LETTER
5/1/2024       RE: Mirta Faria  [address]  [facility] [address]     Dear Colleague,    Thank you for referring your patient, Mirta Faria, to the [facility] [place] at [facility]. Please see a copy of my visit note below.    04/17/24 10:29 AM  PATIENT LAB/IMAGING STATUS : Left voicemail to complete standing/future orders  Farida Ennis   04/30/24 10:55 AM  PATIENT LAB/IMAGING STATUS : Orders completed / No further action needed on 4/25/24.  Farida Ennis         Endocrinology Clinic Visit         NAME:  Mirta Faria  PCP:  Fernanda Haas  MRN:  [id number]  Reason for Consult:  osteoporosis   Requesting Provider:  Fernanda Haas    Chief Complaint     Chief Complaint   Patient presents with    Endocrine Problem     Re-check osteoporosis       History of Present Illness     Mirta Faria is a 73 year old female who is seen in clinic for osteoporosis. She is here with her . Last seen 10/2023    She was first diagnosed with OP in 2016, per her notes she was started on prolia 2/2018 ( corrected from my previous note). Per chart review she was seen by Dr. Papi Bedolla 12/2016 with plan to start prolia. There is a note from yuri Martinez 12/23/2016 that she received prolia injection.She continued on prolia every 6 month, had prolia on 2/2023 and last one on 8/2023 those were done in [place]. She said most of her osteoporosis care was completed in [place]. She had IV reclast on 8/2021 ordered by Dr Papi Bedolla with plan to stop prolia.    Her PMH is otherwise significant for 2 hip replacement due to OA. Her problem list report hypercalciuria; she had a 24 hour urine of calcium on 9/2020 was 269 mg. She was on chlorthalidone in the past.    I reviewed her Dexa scan in records: she had one in 2007 but I am unable to see it.  DEXA scan in 2016 (only lumbar area is scanned), lowest T score of -2.6.  DEXA scan in  2018, lowest T score of -2.1 at the lumbar spine, there was significant 6.7% increase in the bone density at the spine and 4.2% increase at the left total hip compared to 2016.  DEXA scan in November 2020 showed lowest T score of -1.7 at the lumbar spine compared to 2018 that has been 4.5% increase in the bone density at the spine and 8.9% increase at the left total hip.  Most recent DEXA scan on November 2022 showed a T score of -2 at the lumbar spine.  Radius was done for the first time which showed a T score of -3.1.  There has been 3.5% decrease in the lumbar spine. SHE IS S/P Rt and Lt hip replacement for OA, unable to complete dexa on those sites.    She was supposed to receive Prolia 2/2024 but she missed; she was in [place].    Calcium intake:   Dietary: 2 glasses milk daily, 1 serving of yogurt everyday, cheese frequently  Supplements: 600 mg 1 tab daily    Vitamin D intake:   Supplements: 80 mcg daily  Last vitamin D level was 75 on 4/25/24.    Osteoporosis Risk factors:   Previous fractures: none in adulthood. Arm fx at age of 14 while horseback riding.  Family history of fragility fracture in parent: no  Current smoking: no  Steroid Use: no  Rheumatoid  arthritis: no  Alcohol (3 or more units per day): no, every other day one glass of wine  Reported loss of height: no  Menstrual history: age at menopause: early 61  Estrogen use after menopause: no  Tendency for falls: no  GI history: Malabsorption (IBD, Celiac, gastric bypass ): no  History of kidney stones: no  History of thyroid disease: no  Physical activity: walking frequently  Weight history: lost 12 lbs over the years.        Problem List     Patient Active Problem List   Diagnosis    Benign Polyps Of The Large Intestine    Hypercalciuria    Lichen Sclerosus Et Atrophicus    Lumbar herniated disc    Osteoporosis        Medications     Current Outpatient Medications   Medication Sig Dispense Refill    acetaminophen (TYLENOL) 500 MG tablet Take 1,000  mg by mouth      ascorbic acid, vitamin C, (VITAMIN C) 250 MG tablet [ASCORBIC ACID, VITAMIN C, (VITAMIN C) 250 MG TABLET] Take 250 mg by mouth daily.      CALCIUM CARBONATE (CALCIUM 500 ORAL) [CALCIUM CARBONATE (CALCIUM 500 ORAL)] Take by mouth.      ciprofloxacin (CIPRO) 500 MG tablet Take 500 mg by mouth As needed for UTI - 3 day treatment      clobetasol (TEMOVATE) 0.05 % external cream [CLOBETASOL (TEMOVATE) 0.05 % CREAM] APPLY SPARINGLY TO AFFECTED AREA(S) TWICE DAILY 15 g 1    cranberry extract 300 mg Tab [CRANBERRY EXTRACT 300 MG TAB] Take by mouth.      estradiol (ESTRACE) 0.1 MG/GM vaginal cream [ESTRADIOL (ESTRACE) 0.01 % (0.1 MG/GRAM) VAGINAL CREAM] 1 gm vaginally 3 times a week 42.5 g 2     Current Facility-Administered Medications   Medication Dose Route Frequency Provider Last Rate Last Admin    denosumab (PROLIA) injection 60 mg  60 mg Subcutaneous Q6 Months Fernanda Haas MD   60 mg at 08/28/23 1305        Allergies     Allergies   Allergen Reactions    Tramadol Nausea       Medical / Surgical History     Past Medical History:   Diagnosis Date    Colon cancer (H) 2005    benign polyp removed    Urinary tract infection      Past Surgical History:   Procedure Laterality Date    ZZHC EXCISION BRACH CLFT CYST,SUPERFICIAL      Description: Surgery Excis Of Branchial Cleft Cyst Confined Skin Subcut T;  Recorded: 08/18/2008;       Social History     Social History     Socioeconomic History    Marital status:      Spouse name: Not on file    Number of children: Not on file    Years of education: Not on file    Highest education level: Not on file   Occupational History    Not on file   Tobacco Use    Smoking status: Never    Smokeless tobacco: Never   Vaping Use    Vaping status: Never Used   Substance and Sexual Activity    Alcohol use: Not on file    Drug use: Not on file    Sexual activity: Not on file   Other Topics Concern    Not on file   Social History Narrative    Not on file     Social  "Determinants of Health     Financial Resource Strain: Not on file   Food Insecurity: Not on file   Transportation Needs: Not on file   Physical Activity: Not on file   Stress: Not on file   Social Connections: Not on file   Interpersonal Safety: Not on file   Housing Stability: Not on file       Family History     Family History   Problem Relation Age of Onset    Alzheimer Disease Other     Osteoporosis Other        ROS     12 ROS completed, pertinent positive and negative in HPI    Physical Exam   BP (!) 147/83 (BP Location: Right arm)   Pulse 76   Ht 1.755 m (5' 9.09\")   Wt 51.7 kg (114 lb)   LMP  (LMP Unknown)   SpO2 98%   BMI 16.79 kg/m     GENERAL: Healthy, alert and no distress  EYES: Eyes grossly normal to inspection.  No discharge or erythema, or obvious scleral/conjunctival abnormalities.  RESP: No audible wheeze, cough, or visible cyanosis.  No visible retractions or increased work of breathing.    SKIN: Visible skin clear. No significant rash, abnormal pigmentation or lesions.  NEURO: Cranial nerves grossly intact.  Mentation and speech appropriate for age.  PSYCH: Mentation appears normal, affect normal/bright, judgement and insight intact, normal speech and appearance well-groomed.     Labs/Imaging     Pertinent Labs were reviewed and updated in EPIC and discussed briefly.  Radiology Results were  reviewed and updated in EPIC and discussed briefly.    Summary of recent findings:   No results found for: A1C    No results found for: \"TSH\", \"T4\"    Creatinine   Date Value Ref Range Status   04/25/2024 1.04 (H) 0.51 - 0.95 mg/dL Final       Recent Labs   Lab Test 12/27/17  0937   CHOL 225*   HDL 90      TRIG 46       No results found for: \"[id number]\", \"[id number]\", \"[id number]\"    I personally reviewed the patient's outside records from [facility] EMR and Care Everywhere. Summary of pertinent findings in HPI.    Impression / Plan     1.  Osteoporosis  Her risk factors for osteoporosis include age " and postmenopausal.  She had a fragmented care between [place] and [place], it seems that there has been a miscommunication between providers.  She received Prolia injection probably since 2016, with a plan for it to be stopped in 2021, she received IV Reclast on August 2021 but continued with Prolia injection in [place] and her most recent injection was August 2023.  Her most recent DEXA scan in November 2021 included the radius which was not done previously and showed a T score of -3.  Her work up for secondary OP was unremarkable. Her bone turnover markers are suppressed. She never had a fragility fracture and with her fragmented care I am worried about the use of anabolic. We reviewed the data about vertebral fractures with delayed prolia injections. Given her interrupted prolia therapy, I think it is best to treat with a dose of IV reclast now then drug holiday.     Discussed 1/3 risk of flu symptoms (acute phase reaction) after treatment with IV zoledronic acid. Discussed risks of osteonecrosis of the jaw (1/200 after tooth extraction, 1/2500 spontaneous) and atypical femur fractures (1/1000) with chronic bisphosphonates. For every atypical femur fracture, 100 typical femur fractures are prevented.    Next dexa 11/2024.      Test and/or medications prescribed today:  Orders Placed This Encounter   Procedures    DX Bone Density         Follow up: 1 year  40 minutes spent on the date of the encounter doing chart review, history and exam, documentation and further activities as noted above.       The longitudinal plan of care for the diagnosis(es)/condition(s) as documented were addressed during this visit. Due to the added complexity in care, I will continue to support Mirta in the subsequent management and with ongoing continuity of care.    Abilio Trujillo MD  Endocrinology, Diabetes and Metabolism  [facility]

## 2024-05-01 NOTE — NURSING NOTE
"Chief Complaint   Patient presents with    Endocrine Problem     Re-check osteoporosis     Blood pressure (!) 147/83, pulse 76, height 1.755 m (5' 9.09\"), weight 51.7 kg (114 lb), SpO2 98%.    Farida Ennis    "

## 2024-05-06 ENCOUNTER — TELEPHONE (OUTPATIENT)
Dept: ENDOCRINOLOGY | Facility: CLINIC | Age: 74
End: 2024-05-06
Payer: MEDICARE

## 2024-05-08 ENCOUNTER — MEDICAL CORRESPONDENCE (OUTPATIENT)
Dept: HEALTH INFORMATION MANAGEMENT | Facility: CLINIC | Age: 74
End: 2024-05-08

## 2024-05-09 ENCOUNTER — ANCILLARY PROCEDURE (OUTPATIENT)
Dept: GENERAL RADIOLOGY | Facility: CLINIC | Age: 74
End: 2024-05-09
Payer: MEDICARE

## 2024-05-09 DIAGNOSIS — G56.10 MEDIAN NEUROPATHY: ICD-10-CM

## 2024-05-09 PROCEDURE — 73130 X-RAY EXAM OF HAND: CPT | Mod: TC | Performed by: RADIOLOGY

## 2024-05-20 ENCOUNTER — INFUSION THERAPY VISIT (OUTPATIENT)
Dept: INFUSION THERAPY | Facility: HOSPITAL | Age: 74
End: 2024-05-20
Attending: STUDENT IN AN ORGANIZED HEALTH CARE EDUCATION/TRAINING PROGRAM
Payer: MEDICARE

## 2024-05-20 VITALS
SYSTOLIC BLOOD PRESSURE: 131 MMHG | DIASTOLIC BLOOD PRESSURE: 71 MMHG | WEIGHT: 114.6 LBS | RESPIRATION RATE: 16 BRPM | TEMPERATURE: 98.2 F | OXYGEN SATURATION: 99 % | BODY MASS INDEX: 16.88 KG/M2 | HEART RATE: 79 BPM

## 2024-05-20 DIAGNOSIS — M81.0 AGE-RELATED OSTEOPOROSIS WITHOUT CURRENT PATHOLOGICAL FRACTURE: Primary | ICD-10-CM

## 2024-05-20 PROCEDURE — 250N000011 HC RX IP 250 OP 636: Mod: JZ | Performed by: STUDENT IN AN ORGANIZED HEALTH CARE EDUCATION/TRAINING PROGRAM

## 2024-05-20 PROCEDURE — 96365 THER/PROPH/DIAG IV INF INIT: CPT

## 2024-05-20 PROCEDURE — 250N000013 HC RX MED GY IP 250 OP 250 PS 637: Performed by: STUDENT IN AN ORGANIZED HEALTH CARE EDUCATION/TRAINING PROGRAM

## 2024-05-20 PROCEDURE — 258N000003 HC RX IP 258 OP 636: Performed by: STUDENT IN AN ORGANIZED HEALTH CARE EDUCATION/TRAINING PROGRAM

## 2024-05-20 RX ORDER — ZOLEDRONIC ACID 5 MG/100ML
5 INJECTION, SOLUTION INTRAVENOUS ONCE
Start: 2025-05-20

## 2024-05-20 RX ORDER — DIPHENHYDRAMINE HYDROCHLORIDE 50 MG/ML
50 INJECTION INTRAMUSCULAR; INTRAVENOUS
Start: 2025-05-20

## 2024-05-20 RX ORDER — ACETAMINOPHEN 325 MG/1
650 TABLET ORAL
Status: DISCONTINUED | OUTPATIENT
Start: 2024-05-20 | End: 2024-05-20

## 2024-05-20 RX ORDER — ALBUTEROL SULFATE 90 UG/1
1-2 AEROSOL, METERED RESPIRATORY (INHALATION)
Start: 2025-05-20

## 2024-05-20 RX ORDER — EPINEPHRINE 1 MG/ML
0.3 INJECTION, SOLUTION INTRAMUSCULAR; SUBCUTANEOUS EVERY 5 MIN PRN
OUTPATIENT
Start: 2025-05-20

## 2024-05-20 RX ORDER — HEPARIN SODIUM (PORCINE) LOCK FLUSH IV SOLN 100 UNIT/ML 100 UNIT/ML
5 SOLUTION INTRAVENOUS
OUTPATIENT
Start: 2025-05-20

## 2024-05-20 RX ORDER — ZOLEDRONIC ACID 5 MG/100ML
5 INJECTION, SOLUTION INTRAVENOUS ONCE
Status: COMPLETED | OUTPATIENT
Start: 2024-05-20 | End: 2024-05-20

## 2024-05-20 RX ORDER — ACETAMINOPHEN 325 MG/1
650 TABLET ORAL
OUTPATIENT
Start: 2025-05-20

## 2024-05-20 RX ORDER — MEPERIDINE HYDROCHLORIDE 25 MG/ML
25 INJECTION INTRAMUSCULAR; INTRAVENOUS; SUBCUTANEOUS EVERY 30 MIN PRN
OUTPATIENT
Start: 2025-05-20

## 2024-05-20 RX ORDER — METHYLPREDNISOLONE SODIUM SUCCINATE 125 MG/2ML
125 INJECTION, POWDER, LYOPHILIZED, FOR SOLUTION INTRAMUSCULAR; INTRAVENOUS
Start: 2025-05-20

## 2024-05-20 RX ORDER — ALBUTEROL SULFATE 0.83 MG/ML
2.5 SOLUTION RESPIRATORY (INHALATION)
OUTPATIENT
Start: 2025-05-20

## 2024-05-20 RX ORDER — HEPARIN SODIUM,PORCINE 10 UNIT/ML
5-20 VIAL (ML) INTRAVENOUS DAILY PRN
OUTPATIENT
Start: 2025-05-20

## 2024-05-20 RX ADMIN — ACETAMINOPHEN 650 MG: 325 TABLET ORAL at 13:22

## 2024-05-20 RX ADMIN — SODIUM CHLORIDE 250 ML: 9 INJECTION, SOLUTION INTRAVENOUS at 13:29

## 2024-05-20 RX ADMIN — ZOLEDRONIC ACID 5 MG: 5 INJECTION, SOLUTION INTRAVENOUS at 13:38

## 2024-05-20 NOTE — PROGRESS NOTES
Infusion Nursing Note:  Mirta Faria presents today for Reclast infusion.    Patient seen by provider today: No   present during visit today: Not Applicable.    Note: Mirta arrived ambulatory in stable condition. She reports having Reclast in the past and does not recall any side effects from the infusion. Reviewed possible side effects. Mirta verbalized understanding plan of care. Pre medicated with tylenol per patient request. Her labs results from 4/25/24 meet parameters for Reclast today. Ca 9.5 and Cr 1.04. Reclast infused over 30 minutes followed by NS rinse. She denies any side effects at this time. Verbalized understanding follow up with MD in one year for her next infusion.      Intravenous Access:  Peripheral IV placed.    Treatment Conditions:  Results reviewed, labs MET treatment parameters, ok to proceed with treatment.      Post Infusion Assessment:  Patient tolerated infusion without incident.  Site patent and intact, free from redness, edema or discomfort.  Access discontinued per protocol.       Discharge Plan:   Patient discharged in stable condition accompanied by: self.  Departure Mode: Ambulatory.      Audrey Small RN

## 2024-06-05 ENCOUNTER — ANCILLARY PROCEDURE (OUTPATIENT)
Dept: MAMMOGRAPHY | Facility: CLINIC | Age: 74
End: 2024-06-05
Attending: FAMILY MEDICINE
Payer: MEDICARE

## 2024-06-05 DIAGNOSIS — Z12.31 VISIT FOR SCREENING MAMMOGRAM: ICD-10-CM

## 2024-06-05 PROCEDURE — 77063 BREAST TOMOSYNTHESIS BI: CPT

## 2024-06-15 NOTE — TELEPHONE ENCOUNTER
I have not seen her for this issue nor did she mention anything when I saw her last.  I do not see her very often so do not know her well.  My guess is that this would have to be ordered by endocrinology as she has an appointment with them.  Otherwise, she would have to see me specifically for this and we would have to do blood work in the usual protocol that we do for anyone else who is can to be following here.    used

## 2024-06-18 ENCOUNTER — TRANSFERRED RECORDS (OUTPATIENT)
Dept: HEALTH INFORMATION MANAGEMENT | Facility: CLINIC | Age: 74
End: 2024-06-18
Payer: MEDICARE

## 2024-07-02 ENCOUNTER — TELEPHONE (OUTPATIENT)
Dept: FAMILY MEDICINE | Facility: CLINIC | Age: 74
End: 2024-07-02
Payer: MEDICARE

## 2024-07-02 ENCOUNTER — TRANSFERRED RECORDS (OUTPATIENT)
Dept: HEALTH INFORMATION MANAGEMENT | Facility: CLINIC | Age: 74
End: 2024-07-02
Payer: MEDICARE

## 2024-07-02 DIAGNOSIS — S14.3XXS INJURY OF RIGHT BRACHIAL PLEXUS, SEQUELA: Primary | ICD-10-CM

## 2024-07-02 NOTE — TELEPHONE ENCOUNTER
Forms/Letter Request    Type of form/letter: OTHER: R hand       Do we have the form/letter: Yes: 7-2-2024    Who is the form from? Mirta brought them in (if other please explain)    Where did/will the form come from? Patient or family brought in       When is form/letter needed by: 5-7 business days    How would you like the form/letter returned: N/A    Patient Notified form requests are processed in 5-7 business days:Yes    Okay to leave a detailed message?: Yes at Home number on file [phone] (home)     Paper work given to Syl

## 2024-07-02 NOTE — TELEPHONE ENCOUNTER
Needs a referral, please fax to [phone]  Attn: Mirta Perez     This is from the paper work that I have to [facility]

## 2024-07-03 NOTE — TELEPHONE ENCOUNTER
Left a detailed message.  Referral faxed to number provided. Copy of paperwork sent to scan. Copy in [facility] file. Closing enc

## 2024-07-03 NOTE — TELEPHONE ENCOUNTER
I did place a referral to neurology.  In reviewing her notes, it may be that there is nothing that they can do for her other than waiting and seeing if she recovers some of her nerve damage.  Just so she is aware of that.

## 2024-07-26 ENCOUNTER — OFFICE VISIT (OUTPATIENT)
Dept: FAMILY MEDICINE | Facility: CLINIC | Age: 74
End: 2024-07-26
Payer: MEDICARE

## 2024-07-26 VITALS
SYSTOLIC BLOOD PRESSURE: 118 MMHG | DIASTOLIC BLOOD PRESSURE: 72 MMHG | WEIGHT: 117 LBS | BODY MASS INDEX: 17.33 KG/M2 | TEMPERATURE: 98.4 F | RESPIRATION RATE: 16 BRPM | OXYGEN SATURATION: 99 % | HEIGHT: 69 IN | HEART RATE: 76 BPM

## 2024-07-26 DIAGNOSIS — M19.012 PRIMARY OSTEOARTHRITIS OF LEFT SHOULDER: ICD-10-CM

## 2024-07-26 DIAGNOSIS — Z01.818 PREOP GENERAL PHYSICAL EXAM: Primary | ICD-10-CM

## 2024-07-26 PROCEDURE — 99214 OFFICE O/P EST MOD 30 MIN: CPT | Performed by: FAMILY MEDICINE

## 2024-07-26 NOTE — PATIENT INSTRUCTIONS

## 2024-07-26 NOTE — PROGRESS NOTES
Preoperative Evaluation  [address] [address]  Phone: [phone]  Fax: [phone]  Primary Provider: Fernanda Haas MD  Pre-op Performing Provider: SANTHOSH JOHNSON DO  Jul 26, 2024 7/26/2024   Surgical Information   What procedure is being done? left shoulder replacment   Facility or Hospital where procedure/surgery will be performed: [facility]   Who is doing the procedure / surgery? Dr Knutson   Date of surgery / procedure: august 2   Time of surgery / procedure: shoulder replacement   Where do you plan to recover after surgery? at home with family        Fax number for surgical facility: [phone]     Assessment & Plan     The proposed surgical procedure is considered INTERMEDIATE risk.    Preop general physical exam  Patient is low risk, discussed holding topical medications the day of procedure.     Primary osteoarthritis of left shoulder  Chronic, not well controlled. Given restriction of motion will not respond to conservative management, recommendations as below.          Implanted Device  No cardiac devices        - No identified additional risk factors other than previously addressed    Preoperative Medication Instructions  Antiplatelet or Anticoagulation Medication Instructions   - Patient is on no antiplatelet or anticoagulation medications.    Additional Medication Instructions   - Herbal medications and vitamins: DO NOT TAKE 14 days prior to surgery.   - Topicals: DO NOT TAKE day of surgery.    Recommendation  Approval given to proceed with proposed procedure, without further diagnostic evaluation.    Moises Kirby is a 74 year old, presenting for the following:  Pre-Op Exam (ARTHROPLASTY TOTAL REVERSE SHOULDER)          7/26/2024    12:29 PM   Additional Questions   Roomed by Gifty Na   Accompanied by N/A     HPI related to upcoming procedure:     Left shoulder OA:    Patient has been suffering with  limited ROM and pain with sleeping on that side. The joint will also catch and click, ongoing for several months.     58.2 points  9.89 METs        7/26/2024   Pre-Op Questionnaire   Have you ever had a heart attack or stroke? No   Have you ever had surgery on your heart or blood vessels, such as a stent placement, a coronary artery bypass, or surgery on an artery in your head, neck, heart, or legs? No   Do you have chest pain with activity? No   Do you have a history of heart failure? No   Do you currently have a cold, bronchitis or symptoms of other infection? No   Do you have a cough, shortness of breath, or wheezing? No   Do you or anyone in your family have previous history of blood clots? No   Do you or does anyone in your family have a serious bleeding problem such as prolonged bleeding following surgeries or cuts? No   Have you ever had problems with anemia or been told to take iron pills? No   Have you had any abnormal blood loss such as black, tarry or bloody stools, or abnormal vaginal bleeding? No   Have you ever had a blood transfusion? No   Are you willing to have a blood transfusion if it is medically needed before, during, or after your surgery? Yes   Have you or any of your relatives ever had problems with anesthesia? No   Do you have sleep apnea, excessive snoring or daytime drowsiness? No   Do you have any artifical heart valves or other implanted medical devices like a pacemaker, defibrillator, or continuous glucose monitor? (!) YES - artificial joints only    What type of device do you have? hips shoulder   Name of the clinic that manages your device [facility]   Do you have artificial joints? (!) YES   Are you allergic to latex? No        Health Care Directive  Patient does not have a Health Care Directive or Living Will: Discussed advance care planning with patient; information given to patient to review.    Preoperative Review of    reviewed - controlled substances reflected in  medication list.    Status of Chronic Conditions:      Patient Active Problem List    Diagnosis Date Noted    Osteoporosis 11/10/2016     Priority: Medium    Hypercalciuria      Priority: Medium     Created by Conversion  Replacement Utility updated for latest IMO load        Lumbar herniated disc 04/16/2015     Priority: Medium    Lichen Sclerosus Et Atrophicus      Priority: Medium     Created by Conversion        Benign Polyps Of The Large Intestine      Priority: Medium     Created by Conversion          Past Medical History:   Diagnosis Date    Colon cancer (H) 2005    benign polyp removed    Urinary tract infection      Past Surgical History:   Procedure Laterality Date    ZZHC EXCISION BRACH CLFT CYST,SUPERFICIAL      Description: Surgery Excis Of Branchial Cleft Cyst Confined Skin Subcut T;  Recorded: 08/18/2008;     Current Outpatient Medications   Medication Sig Dispense Refill    acetaminophen (TYLENOL) 500 MG tablet Take 1,000 mg by mouth      ascorbic acid, vitamin C, (VITAMIN C) 250 MG tablet [ASCORBIC ACID, VITAMIN C, (VITAMIN C) 250 MG TABLET] Take 250 mg by mouth daily.      CALCIUM CARBONATE (CALCIUM 500 ORAL) [CALCIUM CARBONATE (CALCIUM 500 ORAL)] Take by mouth.      ciprofloxacin (CIPRO) 500 MG tablet Take 500 mg by mouth As needed for UTI - 3 day treatment      clobetasol (TEMOVATE) 0.05 % external cream [CLOBETASOL (TEMOVATE) 0.05 % CREAM] APPLY SPARINGLY TO AFFECTED AREA(S) TWICE DAILY 15 g 1    cranberry extract 300 mg Tab [CRANBERRY EXTRACT 300 MG TAB] Take by mouth.      estradiol (ESTRACE) 0.1 MG/GM vaginal cream [ESTRADIOL (ESTRACE) 0.01 % (0.1 MG/GRAM) VAGINAL CREAM] 1 gm vaginally 3 times a week 42.5 g 2       Allergies   Allergen Reactions    Tramadol Nausea        Social History     Tobacco Use    Smoking status: Never    Smokeless tobacco: Never   Substance Use Topics    Alcohol use: Not on file     Family History   Problem Relation Age of Onset    Alzheimer Disease Other      "Osteoporosis Other      History   Drug Use Not on file             Review of Systems  CONSTITUTIONAL: NEGATIVE for fever, chills, change in weight  INTEGUMENTARY/SKIN: NEGATIVE for worrisome rashes, moles or lesions  EYES: NEGATIVE for vision changes or irritation  ENT/MOUTH: NEGATIVE for ear, mouth and throat problems  RESP: NEGATIVE for significant cough or SOB  BREAST: NEGATIVE for masses, tenderness or discharge  CV: NEGATIVE for chest pain, palpitations or peripheral edema  GI: NEGATIVE for nausea, abdominal pain, heartburn, or change in bowel habits  : NEGATIVE for frequency, dysuria, or hematuria  MUSCULOSKELETAL: NEGATIVE for significant arthralgias or myalgia  NEURO: NEGATIVE for weakness, dizziness or paresthesias  ENDOCRINE: NEGATIVE for temperature intolerance, skin/hair changes  HEME: NEGATIVE for bleeding problems  PSYCHIATRIC: NEGATIVE for changes in mood or affect    Objective    /72 (BP Location: Right arm, Patient Position: Sitting, Cuff Size: Adult Small)   Pulse 76   Temp 98.4  F (36.9  C)   Resp 16   Ht 1.755 m (5' 9.09\")   Wt 53.1 kg (117 lb)   LMP  (LMP Unknown)   SpO2 99%   BMI 17.23 kg/m     Estimated body mass index is 17.23 kg/m  as calculated from the following:    Height as of this encounter: 1.755 m (5' 9.09\").    Weight as of this encounter: 53.1 kg (117 lb).  Physical Exam  GENERAL: alert and no distress  EYES: Eyes grossly normal to inspection, PERRL and conjunctivae and sclerae normal  HENT: ear canals and TM's normal, nose and mouth without ulcers or lesions  NECK: no adenopathy, no asymmetry, masses, or scars  RESP: lungs clear to auscultation - no rales, rhonchi or wheezes  CV: regular rate and rhythm, normal S1 S2, no S3 or S4, no murmur, click or rub, no peripheral edema  ABDOMEN: soft, nontender, no hepatosplenomegaly, no masses and bowel sounds normal  MS: no gross musculoskeletal defects noted, no edema  SKIN: no suspicious lesions or rashes  PSYCH: mentation " appears normal, affect normal/bright    Recent Labs   Lab Test 04/25/24  0910   CR 1.04*        Diagnostics  No labs were ordered during this visit.   No EKG required, no history of coronary heart disease, significant arrhythmia, peripheral arterial disease or other structural heart disease.    Revised Cardiac Risk Index (RCRI)  The patient has the following serious cardiovascular risks for perioperative complications:   - No serious cardiac risks = 0 points     RCRI Interpretation: 0 points: Class I (very low risk - 0.4% complication rate)         Signed Electronically by: SANTHOSH JOHNSON DO  A copy of this evaluation report is provided to the requesting physician.

## 2024-08-01 NOTE — TELEPHONE ENCOUNTER
Looks like she has seen you for a couple pre ops over the last year. Order pended    [FreeTextEntry1] :  Patient came in for confirmation of pregnancy.

## 2024-08-06 ENCOUNTER — TELEPHONE (OUTPATIENT)
Dept: FAMILY MEDICINE | Facility: CLINIC | Age: 74
End: 2024-08-06
Payer: MEDICARE

## 2024-08-06 DIAGNOSIS — M19.012 PRIMARY OSTEOARTHRITIS OF LEFT SHOULDER: Primary | ICD-10-CM

## 2024-08-06 NOTE — TELEPHONE ENCOUNTER
Order/Referral Request    Who is requesting: Patient    Orders being requested: PT referral,     Reason service is needed/diagnosis:  recovering from shoulder surgery (4 days ago) and wants to start soon    When are orders needed by: MELONIE      Has this been discussed with Provider: No    Does patient have a preference on a Group/Provider/Facility? In [facility]    Does patient have an appointment scheduled?: No    Where to send orders: Discuss when you call back    Okay to leave a detailed message?: Yes at Cell number on file:    Telephone Information:   Mobile [phone]

## 2024-08-07 NOTE — TELEPHONE ENCOUNTER
Spoke with patient. Surgery was done at [facility]. Wants to get the ball rolling to have PT done up here. I pended the referral. Please review and enter specifics. No need to call her unless there is an issue.

## 2024-08-09 ENCOUNTER — HOSPITAL ENCOUNTER (OUTPATIENT)
Dept: GENERAL RADIOLOGY | Facility: HOSPITAL | Age: 74
Discharge: HOME | End: 2024-08-09
Attending: NURSE PRACTITIONER | Admitting: NURSE PRACTITIONER
Payer: MEDICARE

## 2024-08-09 DIAGNOSIS — Z96.612 STATUS POST TOTAL SHOULDER ARTHROPLASTY, LEFT: ICD-10-CM

## 2024-08-09 PROCEDURE — 73030 X-RAY EXAM OF SHOULDER: CPT | Mod: LT

## 2024-08-18 NOTE — LETTER
10/10/2023       RE: Mirta Faria  [address]  [facility] [address]     Dear Colleague,    Thank you for referring your patient, Mirta Faria, to the [facility] [place] at [facility]. Please see a copy of my visit note below.    Endocrinology Clinic Visit 10/10/2023      Video-Visit Details    Type of service:  Video Visit  Joined the call at 10/10/2023, 9:12:32 am.  Left the call at 10/10/2023, 9:47:32 am.  Originating Location (pt. Location): Home        Distant Location (provider location):  Off-site    Mode of Communication:  Video Conference via [employer]    Physician has received verbal consent for a Video Visit from the patient? Yes    I spent a total of 60 minutes on the date of encounter reviewing medical records, evaluating the patient, coordinating care and documenting in the EHR, as detailed above.      NAME:  Mirta Faria  PCP:  Fernanda Haas  MRN:  [id number]  Reason for Consult:  osteoporosis   Requesting Provider:  Fernanda Haas    Chief Complaint     Chief Complaint   Patient presents with    RECHECK       History of Present Illness     Mirta Faria is a 73 year old female who is seen in video visit for osteoporosis    She was first diagnosed with OP in 2016, per her notes she was started on prolia 2/2020. Per chart review she was seen by Dr. Papi Bedolla 12/2016 with plan to start prolia. There is a note from yuri Martinez 12/23/2016 that she received prolia injection.She continued on prolia every 6 month, had prolia on 2/2023 and last one on 8/2023 those were done in [place]. She said most of her osteoporosis care was completed in [place]. She had IV reclast on 8/2021 ordered by Dr Papi Bedolla with plan to stop prolia.    Her PMH is otherwise significant for 2 hip replacement due to OA. Her problem list report hypercalciuria; she had a 24 hour urine of calcium on 9/2020 was 269 mg. She  2/17/2023    Simon Schmidt MD  [address]  [facility] [address]    RE: Romaine Farah       Dear Colleague,     I had the pleasure of seeing Romaine Farah in the [facility].            Assessment/Plan:   1.  Sick sinus syndrome status post new atrial based single-chamber [employer] MRI compatible pacemaker placement on February 12, 2019: The patient has been doing better.  His device is interrogated today, normal function.    He had exercise stress echo on January 31, 2023 which was reported a small size of inducible myocardial in anteroseptal wall.  The patient has no chest pain.  He achieved good exercise of workload 7 minutes on treadmill.  His left ventricular ejection fraction is 60 to 65%.  The patient's exercise stress echo imaging is low quality due to technical difficulty.  It is a low risk.  After discussion, no further cardiac evaluation at this time.  If he developed some symptoms, the patient will call me back and then we will consider further cardiac evaluation.  The patient and his wife agreed with the plan.     2.  Benign essential hypertension: He has off lisinopril 5 mg daily.  His blood pressure is in normal range.     3.  Benign tremor: Stable.    4.  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  The patient is cleared for the use of methadone from a cardiology standpoint.    Thank you for the opportunity to be involved in the care of Romaine Farah. If you have any questions, please feel free to contact me.  I will see the patient again in 12 months and as needed.    Much or all of the text in this note was generated through the use of Dragon Dictate voice-to-text software. Errors in spelling or words which seem out of context are unintentional. Sound alike errors, in particular, may have escaped editing.       History of Present Illness:   It is my pleasure to see Romaine  PERLA Joeon at the [facility] clinic for routine cardiology follow up.  Romaine Farah is a 78 year old male with a medical history of sinus node dysfunction status post new atrial based single-chamber [employer] MRI compatible pacemaker placement on February 12, 2019, benign essential hypertension, benign tremor, opioid dependence on methadone.    The patient states that he has no chest pain, shortness of breath, palpitations.  He has mild dyspnea on exertion which has been stable.  He has no dizziness, syncope.  He has no orthopnea, PND or leg edema.  His blood pressure and heart rate in normal range.    His pacemaker interrogation today demonstrated normal device function, no cardiac arrhythmia.  ECG showed    Past Medical History:     Patient Active Problem List   Diagnosis     Bradycardia     Cardiac pacemaker in situ     Controlled substance agreement signed     Benign familial tremor     History of asbestos exposure     Osteoarthrosis     Idiopathic progressive polyneuropathy     Sick sinus syndrome (H)     Impotence of organic origin     Absence of toe (H)     Chronic fatigue syndrome     Chronic obstructive pulmonary disease (H)     Hammer toe of right foot     Hearing loss     Long term current use of systemic steroids     Memory deficit     Obesity     Opioid dependence (H)     Vitamin B deficiency     PAD (peripheral artery disease) (H)     Bronchitis     Acute encephalopathy     Acute respiratory failure with hypoxia and hypercarbia (H)     Gastroesophageal reflux disease without esophagitis     Hypotension, unspecified hypotension type     BERNICE (acute kidney injury) (H)       Past Surgical History:     Past Surgical History:   Procedure Laterality Date     AMPUTATE TOE(S) Left 2/20/2020    Procedure: AMPUTATION, third digit left foot;  Surgeon: Wai Armstrong DPM;  [place] [facility]  Service: Podiatry     AMPUTATE TOE(S) Right 7/7/2020    Procedure: AMPUTATION,  was on chlorthalidone in the past.    I reviewed her Dexa scan in records: she had one in 2007 but I am unable to see it.  DEXA scan in 2016 (only lumbar area is scanned), lowest T score of -2.6.  DEXA scan in 2018, lowest T score of -2.1 at the lumbar spine, there was significant 6.7% increase in the bone density at the spine and 4.2% increase at the left total hip compared to 2016.  DEXA scan in November 2020 showed lowest T score of -1.7 at the lumbar spine compared to 2018 that has been 4.5% increase in the bone density at the spine and 8.9% increase at the left total hip.  Most recent DEXA scan on November 2022 showed a T score of -2 at the lumbar spine.  Radius was done for the first time which showed a T score of -3.1.  There has been 3.5% decrease in the lumbar spine.      Calcium intake:   Dietary: 2 glasses milk daily, 1 serving of yogurt everyday, cheese frequently  Supplements: 600 mg 1 tab daily    Vitamin D intake:   Supplements: 80 mcg daily  Last vitamin D level was 30 on August 2020.    Osteoporosis Risk factors:   Previous fractures: none in adulthood. Arm fx at age of 14 while horseback riding.  Family history of fragility fracture in parent: no  Current smoking: no  Steroid Use: no  Rheumatoid  arthritis: no  Alcohol (3 or more units per day): no, every other day one glass of wine  Reported loss of height: no  Menstrual history: age at menopause: early 61  Estrogen use after menopause: no  Tendency for falls: no  GI history: Malabsorption (IBD, Celiac, gastric bypass ): no  History of kidney stones: no  History of thyroid disease: no  Physical activity: walking frequently  Weight history: lost 12 lbs over the years.        Problem List     Patient Active Problem List   Diagnosis    Benign Polyps Of The Large Intestine    Hypercalciuria    Lichen Sclerosus Et Atrophicus    Lumbar herniated disc    Osteoporosis        Medications     Current Outpatient Medications   Medication    acetaminophen  (TYLENOL) 500 MG tablet    ascorbic acid, vitamin C, (VITAMIN C) 250 MG tablet    CALCIUM CARBONATE (CALCIUM 500 ORAL)    ciprofloxacin (CIPRO) 500 MG tablet    clobetasol (TEMOVATE) 0.05 % external cream    cranberry extract 300 mg Tab    estradiol (ESTRACE) 0.1 MG/GM vaginal cream     Current Facility-Administered Medications   Medication    denosumab (PROLIA) injection 60 mg        Allergies     Allergies   Allergen Reactions    Tramadol Nausea       Medical / Surgical History     Past Medical History:   Diagnosis Date    Colon cancer (H) 2005    benign polyp removed    Urinary tract infection      Past Surgical History:   Procedure Laterality Date    ZZHC EXCISION BRACH CLFT CYST,SUPERFICIAL      Description: Surgery Excis Of Branchial Cleft Cyst Confined Skin Subcut T;  Recorded: 08/18/2008;       Social History     Social History     Socioeconomic History    Marital status:      Spouse name: Not on file    Number of children: Not on file    Years of education: Not on file    Highest education level: Not on file   Occupational History    Not on file   Tobacco Use    Smoking status: Never    Smokeless tobacco: Never   Vaping Use    Vaping Use: Never used   Substance and Sexual Activity    Alcohol use: Not on file    Drug use: Not on file    Sexual activity: Not on file   Other Topics Concern    Not on file   Social History Narrative    Not on file     Social Determinants of Health     Financial Resource Strain: Not on file   Food Insecurity: Not on file   Transportation Needs: Not on file   Physical Activity: Not on file   Stress: Not on file   Social Connections: Not on file   Interpersonal Safety: Not on file   Housing Stability: Not on file       Family History     Family History   Problem Relation Age of Onset    Alzheimer Disease Other     Osteoporosis Other        ROS     12 ROS completed, pertinent positive and negative in HPI    Physical Exam   LMP  (LMP Unknown)    GENERAL: Healthy, alert and no  second digit right foot;  Surgeon: Wai Armstrong DPM;  Location: [facility] Main OR;  Service: Podiatry     EP PACEMAKER INSERT N/A 2/12/2019    Procedure: EP Pacemaker Insertion;  Surgeon: Micheal Mahoney MD;  Location: [facility];  Service: Cardiology     EYE SURGERY Bilateral      HIP SURGERY Right     total hip     INSERT / REPLACE / REMOVE PACEMAKER  02/12/2019     PACEMAKER/ICD CHECK  2019    low heart rate - 35     TOTAL HIP ARTHROPLASTY Right 2/2/2015    Procedure: #3   HIP TOTAL ARTHROPLASTY, RIGHT;  Surgeon: Aryan Glass MD;  Location: [facility] OR;  Service:        Family History:     Family History   Problem Relation Age of Onset     LUNG DISEASE Mother      Diabetes Mother      Esophageal Cancer Father      Tremor Father      Alcoholism Father      Tremor Sister      Neuropathy Sister      Arthritis Brother      Other - See Comments Brother      Other - See Comments Brother      Tremor Sister      Lupus Daughter      Tremor Daughter      Thyroid Disease Daughter      Chronic Obstructive Pulmonary Disease Mother         Social History:    reports that he has quit smoking. His smoking use included cigars, cigarillos or filtered cigars. He has never used smokeless tobacco. He reports that he does not currently use alcohol. He reports that he does not use drugs.    Review of Systems:   12 systems are reviewed negative except for in HPI.    Meds:     Current Outpatient Medications:      calcium carbonate-vitamin D (OSCAL W/D) 500-200 MG-UNIT tablet, Take 1 tablet by mouth daily , Disp: , Rfl:      clobetasol (TEMOVATE) 0.05 % external ointment, Apply topically 2 times daily As needed, Disp: 60 g, Rfl: 11     DULoxetine (CYMBALTA) 30 MG capsule, Take 1 capsule (30 mg) by mouth 2 times daily, Disp: 180 capsule, Rfl: 3     fluocinonide (LIDEX) 0.05 % external cream, Apply topically 2 times daily As needed, Disp: , Rfl:      Fluticasone-Umeclidin-Vilanterol (TRELEGY ELLIPTA) 200-62.5-25  "distress  EYES: Eyes grossly normal to inspection.  No discharge or erythema, or obvious scleral/conjunctival abnormalities.  RESP: No audible wheeze, cough, or visible cyanosis.  No visible retractions or increased work of breathing.    SKIN: Visible skin clear. No significant rash, abnormal pigmentation or lesions.  NEURO: Cranial nerves grossly intact.  Mentation and speech appropriate for age.  PSYCH: Mentation appears normal, affect normal/bright, judgement and insight intact, normal speech and appearance well-groomed.     Labs/Imaging     Pertinent Labs were reviewed and updated in EPIC and discussed briefly.  Radiology Results were  reviewed and updated in EPIC and discussed briefly.    Summary of recent findings:   No results found for: A1C    No results found for: \"TSH\", \"T4\"    Creatinine   Date Value Ref Range Status   07/06/2022 0.71 0.51 - 0.95 mg/dL Final       Recent Labs   Lab Test 12/27/17  0937   CHOL 225*   HDL 90      TRIG 46       No results found for: \"EPQS85AZESW\", \"[id number]\", \"[id number]\"    I personally reviewed the patient's outside records from [facility] EMR and Care Everywhere. Summary of pertinent findings in HPI.    Impression / Plan     1.  Osteoporosis  Her risk factors for osteoporosis include age and postmenopausal.  She had a fragmented care between [place] and [place], it seems that there has been a miscommunication between providers.  She received Prolia injection probably since 2016, with a plan for it to be stopped in 2021, she received IV Reclast on August 2021 but continued with Prolia injection in [place] and her most recent injection was August 2023.  Her most recent DEXA scan in November 2021 included the radius which was not done previously and showed a T score of -3.  We discussed completing work-up for secondary osteoporosis as below.  Based on work-up below we will decide on next step and treatment.  We reviewed adequate calcium and vitamin D.  Fall " MCG/INH oral inhaler, Inhale 1 puff into the lungs daily, Disp: 1 each, Rfl: 11     levocetirizine (XYZAL) 5 MG tablet, Take 5 mg by mouth every evening, Disp: , Rfl:      magnesium 250 MG tablet, Take 1 tablet by mouth 3 times daily, Disp: , Rfl:      methadone (DOLOPHINE) 10 MG tablet, 1 pill by mouth daily @ 9am, 1 pill @ 1pm, 1 pill @ 5pm and 1 pill @ 10/11pm   - 4 pills per day, Disp: 1 tablet, Rfl: 0     Omega-3 Fatty Acids (FISH OIL BURP-LESS) 1000 MG CAPS, 2 by mouth daily in morning @ 9am, Disp: , Rfl:      predniSONE (DELTASONE) 20 MG tablet, Take 1 tablet (20 mg) by mouth daily (Patient taking differently: Take 20 mg by mouth daily Takes as needed), Disp: 7 tablet, Rfl: 3     Pregabalin (LYRICA) 100 MG capsule, Take 1 capsule (100 mg) by mouth 3 times daily, Disp: 30 capsule, Rfl: 0     topiramate (TOPAMAX) 25 MG tablet, Take 3 tablets (75 mg) by mouth 2 times daily, Disp: 540 tablet, Rfl: 3     vitamin (B COMPLEX) tablet, Take 1 tablet by mouth daily @ 9am, Disp: , Rfl:      vitamin C (ASCORBIC ACID) 100 MG tablet, Take 100 mg by mouth daily , Disp: , Rfl:      Vitamin D3 (CHOLECALCIFEROL) 25 mcg (1000 units) tablet, Take 1 tablet by mouth daily , Disp: , Rfl:      ibuprofen (ADVIL/MOTRIN) 200 MG tablet, Take 1 tablet (200 mg) by mouth every 8 hours as needed for mild pain (Patient not taking: Reported on 2/17/2023), Disp: , Rfl:     Allergies:   Patient has no known allergies.      Objective:      Physical Exam  99.8 kg (220 lb)  1.829 m (6')  Body mass index is 29.84 kg/m .  /72 (BP Location: Left arm, Patient Position: Sitting, Cuff Size: Adult Large)   Pulse 60   Resp 12   Ht 1.829 m (6')   Wt 99.8 kg (220 lb)   SpO2 (!) 87%   BMI 29.84 kg/m      General Appearance:   Awake, Alert, No acute distress.   HEENT:  Pupil equal and reactive to light. No scleral icterus; the mucous membranes were moist.   Neck: No cervical bruits. No JVD. No thyromegaly.     Chest: The spine was straight. The  chest was symmetric.   Lungs:   Respirations unlabored; Lungs are clear to auscultation. No crackles. No wheezing.   Cardiovascular:   Regular rhythm and rate, normal first and second heart sounds with no murmurs. No rubs or gallops.    Abdomen:  Soft. No tenderness. Non-distended. Bowels sounds are present   Extremities: Equal tibial pulses. No leg edema.   Skin: No rashes or ulcers. Warm, Dry.   Musculoskeletal: No tenderness. No deformity.   Neurologic: Mood and affect are appropriate. No focal deficits.         EKG: Personally reviewed  Atrial-paced rhythm with prolonged AV conduction   Right axis deviation   Right ventricular hypertrophy   Abnormal QRS-T angle, consider primary T wave abnormality   Abnormal ECG   When compared with ECG of 26-DEC-2022 21:49,   Electronic atrial pacemaker has replaced Sinus rhythm   QRS axis Shifted right   ST no longer depressed in Anterior leads   T wave inversion no longer evident in Anterior leads    Cardiac Imaging Studies  Exercise stress echo at [facility] on January 31, 2023:  Technically difficult study due to patient imaging characteristics.  Normal left ventricular size, LVEF was 60 to 65%.  No clear rest regional wall motion abnormality.  Mild LVH, normal left ventricular diastolic function, no obvious valvular disease.  Stress echo images showed a small area of ischemia in anteroseptal wall.    Lab Review   Lab Results   Component Value Date     12/27/2022    CO2 22 12/27/2022    CO2 25 06/20/2022    BUN 17.4 12/27/2022    BUN 22 06/20/2022     Lab Results   Component Value Date    WBC 11.3 12/26/2022    HGB 15.8 12/26/2022    HCT 49.9 12/26/2022    MCV 91 12/26/2022     12/26/2022     Lab Results   Component Value Date    CHOL 184 06/20/2022    CHOL 176 07/07/2017    CHOL 194 01/12/2016     Lab Results   Component Value Date    HDL 40 06/20/2022    HDL 46 07/07/2017    HDL 53 01/12/2016     No components found for: LDLCALC  Lab Results   Component  precautions.      Test and/or medications prescribed today:  Orders Placed This Encounter   Procedures    25 Hydroxyvitamin D2 and D3    Albumin level    Calcium    Magnesium    Parathyroid Hormone Intact    Phosphorus    Creatinine    N telopeptide cross linked urine    [facility]; N Telopeptide Serum (Ntx) (Laboratory Miscellaneous Order)    C-Telopeptide, Beta-Cross-Linked         Follow up: 4-month      Abilio Trujillo MD  Endocrinology, Diabetes and Metabolism  [facility]   Value Date    TRIG 115 06/20/2022    TRIG 70 07/07/2017    TRIG 54 01/12/2016     No results found for: CHOLHDL  Lab Results   Component Value Date    TROPONINI <0.01 10/04/2019     No results found for: BNP  Lab Results   Component Value Date    TSH 1.33 12/26/2022    TSH 3.64 07/29/2019                 Thank you for allowing me to participate in the care of your patient.      Sincerely,     Ann Marie Hinojosa MD     [facility]  cc:   Tiffani Abdul MD  [address] [place] [id number]         Patient baseline mental status

## 2024-08-26 DIAGNOSIS — M94.9 DISORDER OF BONE AND CARTILAGE: ICD-10-CM

## 2024-08-26 DIAGNOSIS — M89.9 DISORDER OF BONE AND CARTILAGE: ICD-10-CM

## 2024-08-26 RX ORDER — ESTRADIOL 0.1 MG/G
CREAM VAGINAL
Qty: 42.5 G | Refills: 0 | Status: SHIPPED | OUTPATIENT
Start: 2024-08-26

## 2024-10-03 ENCOUNTER — TRANSFERRED RECORDS (OUTPATIENT)
Dept: HEALTH INFORMATION MANAGEMENT | Facility: CLINIC | Age: 74
End: 2024-10-03
Payer: MEDICARE

## 2024-10-07 ENCOUNTER — OFFICE VISIT (OUTPATIENT)
Dept: FAMILY MEDICINE | Facility: CLINIC | Age: 74
End: 2024-10-07
Payer: MEDICARE

## 2024-10-07 VITALS
HEIGHT: 69 IN | OXYGEN SATURATION: 96 % | DIASTOLIC BLOOD PRESSURE: 92 MMHG | HEART RATE: 78 BPM | SYSTOLIC BLOOD PRESSURE: 144 MMHG | TEMPERATURE: 97.7 F | BODY MASS INDEX: 17.21 KG/M2 | WEIGHT: 116.2 LBS | RESPIRATION RATE: 20 BRPM

## 2024-10-07 DIAGNOSIS — N30.01 ACUTE CYSTITIS WITH HEMATURIA: Primary | ICD-10-CM

## 2024-10-07 LAB
ALBUMIN UR-MCNC: NEGATIVE MG/DL
APPEARANCE UR: CLEAR
BACTERIA #/AREA URNS HPF: ABNORMAL /HPF
BILIRUB UR QL STRIP: NEGATIVE
COLOR UR AUTO: YELLOW
GLUCOSE UR STRIP-MCNC: NEGATIVE MG/DL
HGB UR QL STRIP: NEGATIVE
KETONES UR STRIP-MCNC: NEGATIVE MG/DL
LEUKOCYTE ESTERASE UR QL STRIP: ABNORMAL
NITRATE UR QL: POSITIVE
PH UR STRIP: 7 [PH] (ref 5–8)
RBC #/AREA URNS AUTO: ABNORMAL /HPF
SP GR UR STRIP: 1.01 (ref 1–1.03)
SQUAMOUS #/AREA URNS AUTO: ABNORMAL /LPF
UROBILINOGEN UR STRIP-ACNC: 0.2 E.U./DL
WBC #/AREA URNS AUTO: ABNORMAL /HPF
WBC CLUMPS #/AREA URNS HPF: PRESENT /HPF

## 2024-10-07 PROCEDURE — 87186 SC STD MICRODIL/AGAR DIL: CPT | Performed by: FAMILY MEDICINE

## 2024-10-07 PROCEDURE — 99213 OFFICE O/P EST LOW 20 MIN: CPT | Performed by: FAMILY MEDICINE

## 2024-10-07 PROCEDURE — 81001 URINALYSIS AUTO W/SCOPE: CPT | Performed by: FAMILY MEDICINE

## 2024-10-07 PROCEDURE — 87086 URINE CULTURE/COLONY COUNT: CPT | Performed by: FAMILY MEDICINE

## 2024-10-07 RX ORDER — SULFAMETHOXAZOLE AND TRIMETHOPRIM 800; 160 MG/1; MG/1
1 TABLET ORAL 2 TIMES DAILY
Qty: 6 TABLET | Refills: 0 | Status: SHIPPED | OUTPATIENT
Start: 2024-10-07 | End: 2024-10-10

## 2024-10-07 RX ORDER — DENOSUMAB 60 MG/ML
60 INJECTION SUBCUTANEOUS
COMMUNITY
Start: 2023-08-09

## 2024-10-07 NOTE — PROGRESS NOTES
Assessment & Plan     Acute cystitis with hematuria  Recently treated with nitrofurantoin for E. coli UTI.  Symptoms have since returned and UA consistent with infection.  Reviewed outside urine culture sensitivities and will offer Bactrim which showed previous sensitivity, and await repeat culture reviewed return precautions.  Currently no systemic signs of infection.  - UA Macroscopic with reflex to Microscopic and Culture - Clinic Collect  - Urine Microscopic Exam  - Urine Culture  - sulfamethoxazole-trimethoprim (BACTRIM DS) 800-160 MG tablet; Take 1 tablet by mouth 2 times daily for 3 days.        Subjective   Mirta is a 74 year old, presenting for the following health issues:  UTI (Treated for UTI 9/28/24 finished Nitrofurantoin 100 mg BID x 5 days, having cramping and urinary urgency, did take AZO last night)        10/7/2024     8:51 AM   Additional Questions   Roomed by Joanna ARREOLA LPN     HPI     Seen at [facility] urgent care 9/28/2024 for urinary urgency and lower abdominal pain.  UA consistent with infection, discharged on Keflex.  Urine culture returned and she was changed to Macrobid.  Initially had some improvement in her symptoms following the Macrobid course but now symptoms have returned.  Awake last night with dysuria.  Azo has been helpful for her symptoms.  Drinking appropriate water.  Denies fevers, chills, nausea, vomiting.  No antibiotic allergies      CULTURE, URINE, ROUTINE         Micro Number:      [id number]     Test Status:       Final     Specimen Source:   Urine     Specimen Quality:  Adequate     Result:            Greater than 100,000 CFU/mL of Escherichia coli                               E.coli                             ----------------                             INT   GINNA      AMOX/CLAVULANATE       R     >=32     AMP/SULBACTAM          I     16      CEFAZOLIN              R     >=64 **1      CEFEPIME               S     <=0.12     CEFTAZIDIME            S     4      CEFTRIAXONE      "       S     <=0.25     CIPROFLOXACIN          R     1      GENTAMICIN             S     <=1     IMIPENEM               S     1      LEVOFLOXACIN           I     1      MEROPENEM              S     <=0.25     NITROFURANTOIN         S     <=16     PIP/TAZOBACTAM         S     <=4     TRIMETHOPRIM/SULFA     S     <=20         Objective    BP (!) 144/92   Pulse 78   Temp 97.7  F (36.5  C) (Oral)   Resp 20   Ht 1.755 m (5' 9.09\")   Wt 52.7 kg (116 lb 3.2 oz)   LMP  (LMP Unknown)   SpO2 96%   BMI 17.12 kg/m    Body mass index is 17.12 kg/m .  Physical Exam   GENERAL: alert and no distress  RESP: lungs clear to auscultation - no rales, rhonchi or wheezes  CV: regular rate and rhythm, normal S1 S2, no S3 or S4, no murmur  ABDOMEN: No CVA tenderness  PSYCH: mentation appears normal, affect normal/bright    Results for orders placed or performed in visit on 10/07/24 (from the past 24 hour(s))   UA Macroscopic with reflex to Microscopic and Culture - Clinic Collect    Specimen: Urine, Clean Catch   Result Value Ref Range    Color Urine Yellow Colorless, Straw, Light Yellow, Yellow    Appearance Urine Clear Clear    Glucose Urine Negative Negative mg/dL    Bilirubin Urine Negative Negative    Ketones Urine Negative Negative mg/dL    Specific Gravity Urine 1.015 1.005 - 1.030    Blood Urine Negative Negative    pH Urine 7.0 5.0 - 8.0    Protein Albumin Urine Negative Negative mg/dL    Urobilinogen Urine 0.2 0.2, 1.0 E.U./dL    Nitrite Urine Positive (A) Negative    Leukocyte Esterase Urine Trace (A) Negative   Urine Microscopic Exam   Result Value Ref Range    Bacteria Urine None Seen None Seen /HPF    RBC Urine None Seen 0-2 /HPF /HPF    WBC Urine 10-25 (A) 0-5 /HPF /HPF    Squamous Epithelials Urine None Seen None Seen /LPF    WBC Clumps Urine Present (A) None Seen /HPF           Signed Electronically by: Fina Buitrago DO    "

## 2024-10-08 LAB — BACTERIA UR CULT: ABNORMAL

## 2024-10-25 ENCOUNTER — LAB (OUTPATIENT)
Dept: LAB | Facility: CLINIC | Age: 74
End: 2024-10-25
Payer: MEDICARE

## 2024-10-25 ENCOUNTER — TELEPHONE (OUTPATIENT)
Dept: FAMILY MEDICINE | Facility: CLINIC | Age: 74
End: 2024-10-25

## 2024-10-25 ENCOUNTER — VIRTUAL VISIT (OUTPATIENT)
Dept: FAMILY MEDICINE | Facility: CLINIC | Age: 74
End: 2024-10-25
Payer: MEDICARE

## 2024-10-25 DIAGNOSIS — R39.9 URINARY TRACT INFECTION SYMPTOMS: ICD-10-CM

## 2024-10-25 DIAGNOSIS — N39.0 RECURRENT UTI: ICD-10-CM

## 2024-10-25 DIAGNOSIS — R39.9 URINARY TRACT INFECTION SYMPTOMS: Primary | ICD-10-CM

## 2024-10-25 DIAGNOSIS — N39.0 ACUTE UTI: ICD-10-CM

## 2024-10-25 LAB
BACTERIA #/AREA URNS HPF: ABNORMAL /HPF
RBC #/AREA URNS AUTO: ABNORMAL /HPF
SQUAMOUS #/AREA URNS AUTO: ABNORMAL /LPF
WBC #/AREA URNS AUTO: >100 /HPF
WBC CLUMPS #/AREA URNS HPF: PRESENT /HPF

## 2024-10-25 PROCEDURE — 81015 MICROSCOPIC EXAM OF URINE: CPT

## 2024-10-25 PROCEDURE — 87186 SC STD MICRODIL/AGAR DIL: CPT

## 2024-10-25 PROCEDURE — 87088 URINE BACTERIA CULTURE: CPT

## 2024-10-25 PROCEDURE — 87086 URINE CULTURE/COLONY COUNT: CPT

## 2024-10-25 PROCEDURE — 99442 PR PHYSICIAN TELEPHONE EVALUATION 11-20 MIN: CPT | Mod: 93

## 2024-10-25 RX ORDER — LEVOFLOXACIN 250 MG/1
250 TABLET, FILM COATED ORAL DAILY
Qty: 3 TABLET | Refills: 0 | Status: SHIPPED | OUTPATIENT
Start: 2024-10-25

## 2024-10-25 RX ORDER — NITROFURANTOIN 25; 75 MG/1; MG/1
100 CAPSULE ORAL PRN
Qty: 14 CAPSULE | Refills: 1 | Status: SHIPPED | OUTPATIENT
Start: 2024-10-25 | End: 2024-11-01

## 2024-10-25 RX ORDER — SULFAMETHOXAZOLE AND TRIMETHOPRIM 800; 160 MG/1; MG/1
1 TABLET ORAL 2 TIMES DAILY
Qty: 6 TABLET | Refills: 0 | Status: SHIPPED | OUTPATIENT
Start: 2024-10-25 | End: 2024-10-25

## 2024-10-25 NOTE — TELEPHONE ENCOUNTER
Test Results    Contacts       Contact Date/Time Type Contact Phone/Fax    10/25/2024 11:08 AM CDT Phone (Incoming) Mirta Faria (Self) [phone] (M)            Who ordered the test:  NIKUNJ Guillaume    Type of test: Lab    Date of test:  10/25/24    Where was the test performed:  [facility] lab     What are your questions/concerns?:  patient had a urine analysis done today 10/25/24. Patient doesn't have MyChart and would like the care team to call with test results.    Okay to leave a detailed message?: Yes at Home number on file [phone] (home)

## 2024-10-25 NOTE — PROGRESS NOTES
Mirta is a 74 year old who is being evaluated via a billable telephone visit.      Originating Location (pt. Location): Home    Distant Location (provider location):  On-site    Assessment & Plan     Urinary tract infection symptoms  Recurrent UTI  Acute UTI  Pt treated with nitrofurantoin 9/25 and bactrim 10/7 for UTI. Will treat with levofloxacin today. Provided nitrofurantoin 100 mg to take as prophylaxis after sexual intercourse, once treatment regimen is completed. Sent in urology referral for further evaluation.   - UA Macroscopic with reflex to Microscopic and Culture - Lab Collect  - levofloxacin (LEVAQUIN) 250 MG tablet  Dispense: 3 tablet; Refill: 0  - nitroFURantoin macrocrystal-monohydrate (MACROBID) 100 MG capsule  Dispense: 14 capsule; Refill: 1          Subjective   Mirta is a 74 year old, presenting for the following health issues:  UTI      10/25/2024    10:53 AM   Additional Questions   Roomed by Lavern WASHINGTON   Accompanied by self     UTI       Pt has had 3 UTIs with E. Coli infection in the last month. Prior to this, last infection was 1 year ago, but had multiple infections that year as well. Pt states symptoms seem to correlate to sexual intercourse. Symptoms include increased urinary frequency. No fevers, flank pain, dysuria.     Was treated initially with nitrofurantoin 9/25, then with bactrim 10/7.       Genitourinary - Female  Onset/Duration: started late on 10/24/2024  Description:   Painful urination (Dysuria): No           Frequency: YES  Blood in urine (Hematuria): No  Delay in urine (Hesitency): No  Intensity: moderate  Progression of Symptoms:  worsening  Accompanying Signs & Symptoms:  Fever/chills: No  Flank pain: YES  Nausea and vomiting: No  Vaginal symptoms: no  Abdominal/Pelvic Pain: No  History:   History of frequent UTI s: YES  History of kidney stones: No  Sexually Active: YES  Possibility of pregnancy: No  Precipitating or alleviating factors: sexual intercourse  Therapies tried  Yes and outcome: Cranberry juice prn (contraindicated in Coumadin patients) and Increase fluid intake      Review of Systems  Constitutional, HEENT, cardiovascular, pulmonary, gi and gu systems are negative, except as otherwise noted.      Objective           Vitals:  No vitals were obtained today due to virtual visit.    Physical Exam   General: Alert and no distress //Respiratory: No audible wheeze, cough, or shortness of breath // Psychiatric:  Appropriate affect, tone, and pace of words      Results for orders placed or performed in visit on 10/25/24 (from the past 24 hours)   UA Microscopic with Reflex to Culture   Result Value Ref Range    Bacteria Urine Few (A) None Seen /HPF    RBC Urine 2-5 (A) 0-2 /HPF /HPF    WBC Urine >100 (A) 0-5 /HPF /HPF    Squamous Epithelials Urine Few (A) None Seen /LPF    WBC Clumps Urine Present (A) None Seen /HPF         Phone call duration: 20 minutes  Signed Electronically by: FARSHAD Cleveland CNP

## 2024-10-25 NOTE — TELEPHONE ENCOUNTER
Per chart review. Ordering provider discussed this with the patient. Closing encounter.    Leighton Cronin RN     [facility]

## 2024-10-25 NOTE — PATIENT INSTRUCTIONS
I have prescribed levofloxacin 250 mg tablets once daily for 3 days. Stop taking and contact me if you experience new tendon pain, numbness/tingling, or confusion/agitation.     After completion of antibiotics, begin taking 100 mg macrobid after sexual intercourse to prevent recurrent UTIs    For recurrent UTIs    Make sure you are staying hydrated.    A clinical trial showed that using 0.5 mg estriol cream nightly for 2 weeks, then twice weekly for 8 months was associated with decreased UTI recurrence. You could try this to see if it helps.    Another thing you could try is a urinary probiotic. AZO makes a probiotic (AZO dual protection: urinary +vaginal support) that you could try as well. This has not been as well studied, and the evidence is inconclusive.    Cranberry: We suggest an 8-ounce (approximately 240 mL) glass of cranberry juice once or twice daily or cranberry concentrate tablets 500 mg to 1000 mg total daily dose     I placed an order for a urology referral. Schedule this when you are back in [place].    I hope you have a great trip!    CATHY Cleveland      Take care and have a great trip!

## 2024-10-27 LAB
BACTERIA UR CULT: ABNORMAL
BACTERIA UR CULT: ABNORMAL

## 2024-11-21 ENCOUNTER — TELEPHONE (OUTPATIENT)
Dept: FAMILY MEDICINE | Facility: CLINIC | Age: 74
End: 2024-11-21
Payer: MEDICARE

## 2024-11-21 NOTE — TELEPHONE ENCOUNTER
Order/Referral Request    Who is requesting: Patient     Orders being requested: Diagnostic Mammogram    Reason service is needed/diagnosis: Pt history of very dense tissue and needs one every 6 months since 6/2023    When are orders needed by: as soon as possible, [place] is going [place] by [place]12    Has this been discussed with Provider: Yes    Does patient have a preference on a Group/Provider/Facility? [facility]    Does patient have an appointment scheduled?: No    Where to send orders: Place orders within Epic    Okay to leave a detailed message?: Yes at Cell number on file:    Telephone Information:   Mobile [phone]

## 2024-11-21 NOTE — TELEPHONE ENCOUNTER
So her last mammo, they said return to routine yearly mammos.  They did not say to do again in 6 months.

## 2024-11-26 NOTE — CONFIDENTIAL NOTE
Chief Complaint:   Frequent UTIs         History of Present Illness:   Mirta Faria is a 74 year old female with a history of lichen sclerosis who presents for evaluation of frequent UTIs.    The patient reports a several year history of frequent UTIs. Her typical symptoms include urinary frequency, urgency, and abdominal pain. She denies dysuria and gross hematuria.     She reports regular bowel movements.     She does notice an association between UTIs and sexual activity. She has never taken post-coital antibiotics.     She denies a history of urologic surgeries and kidney stones.      She is using vaginal estrogen cream three times weekly.          Past Medical History:     Past Medical History:   Diagnosis Date    Colon cancer (H) 2005    benign polyp removed    Urinary tract infection             Past Surgical History:     Past Surgical History:   Procedure Laterality Date    ZZHC EXCISION BRACH CLFT CYST,SUPERFICIAL      Description: Surgery Excis Of Branchial Cleft Cyst Confined Skin Subcut T;  Recorded: 08/18/2008;            Medications     Current Outpatient Medications   Medication Sig Dispense Refill    acetaminophen (TYLENOL) 500 MG tablet Take 1,000 mg by mouth      ascorbic acid, vitamin C, (VITAMIN C) 250 MG tablet [ASCORBIC ACID, VITAMIN C, (VITAMIN C) 250 MG TABLET] Take 250 mg by mouth daily.      CALCIUM CARBONATE (CALCIUM 500 ORAL) [CALCIUM CARBONATE (CALCIUM 500 ORAL)] Take by mouth.      clobetasol (TEMOVATE) 0.05 % external cream [CLOBETASOL (TEMOVATE) 0.05 % CREAM] APPLY SPARINGLY TO AFFECTED AREA(S) TWICE DAILY 15 g 1    denosumab (PROLIA) 60 mg/mL injection Inject 60 mg subcutaneously.      estradiol (ESTRACE) 0.1 MG/GM vaginal cream [ESTRADIOL (ESTRACE) 0.01 % (0.1 MG/GRAM) VAGINAL CREAM] 1 gm vaginally 3 times a week 42.5 g 0    levofloxacin (LEVAQUIN) 250 MG tablet Take 1 tablet (250 mg) by mouth daily. 3 tablet 0     No current facility-administered medications for this  visit.            Allergies:   Tramadol         Review of Systems:  From intake questionnaire   Negative 14 system review except as noted on HPI, nurse's note.         Physical Exam:   Patient is a 74 year old female evaluated via video visit.       Labs and Pathology:    I personally reviewed all applicable laboratory data and went over findings with patient  Significant for:    CBC RESULTS:  Recent Labs   Lab Test 07/06/22  1016 08/05/21  1216 07/29/20  1640   WBC 6.7 4.8  --    HGB 13.4 12.5 13.5    131*  --         BMP RESULTS:  Recent Labs   Lab Test 04/25/24  0910 08/09/23  1047 07/06/22  1016 08/05/21  1216 08/03/21  1412 07/29/20  1640   NA  --   --  137 136  --  139   POTASSIUM  --   --  4.0 3.6  --  4.2   CHLORIDE  --   --  98 100  --  102   CO2  --   --  26 26  --  29   ANIONGAP  --   --  13 10  --  8   GLC  --   --  95 101  --  85   BUN  --   --  10.9 13  --  15   CR 1.04*  --  0.71 0.76 0.73 0.71   GFRESTIMATED 57*  --  90 80 84 >60   GFRESTBLACK  --   --   --   --   --  >60   CORI 9.5 9.7 9.3 9.4 9.2 9.8       UA RESULTS:   Recent Labs   Lab Test 10/25/24  1010 10/07/24  0837 06/28/23  1247 05/26/23  1035   SG  --  1.015 1.010 1.025   URINEPH  --  7.0 6.5 5.5   NITRITE  --  Positive* Negative Negative   RBCU 2-5* None Seen None Seen 10-25*   WBCU >100* 10-25* 0-5 *            Assessment and Plan:     Assessment: 74 year old female seen in evaluation for frequent UTIs. She has been using vaginal estrogen cream for a while. She does notice an association between UTIs and sexual activity. She had a cystoscopy last year that was normal.      We discussed preventative measures for UTIs including hydration, management of irregular bowel movements, proper hygiene, supplementation with vitamin C or cranberry, vaginal estrogen cream, methenamine with vitamin C, post-coital antibiotic prophylaxis, and daily antibiotic prophylaxis. She would like to try a daily cranberry supplement. She will also work  to increase her fluid intake.     Plan:  Continue vaginal estrogen cream two to three nights per week.   Start a daily cranberry supplement.   Increase fluid intake.   Follow up with urology as needed.     JORGE STEVENS PA-C  Department of Urology

## 2024-11-27 ENCOUNTER — VIRTUAL VISIT (OUTPATIENT)
Dept: UROLOGY | Facility: CLINIC | Age: 74
End: 2024-11-27
Payer: MEDICARE

## 2024-11-27 DIAGNOSIS — N39.0 RECURRENT UTI: ICD-10-CM

## 2024-11-27 NOTE — PATIENT INSTRUCTIONS
Supplements to prevent UTIs:  1. Cranberry-use as directed   a. Ellura: www.myellura.com   b. Theracran HP by TherEdeniQix   2. Vitamin C 500-1000 mg twice daily  3. D-mannose 2 g daily

## 2024-11-27 NOTE — PROGRESS NOTES
Mirta Faria is a 74 year old who is being evaluated via telephone visit.       Distant Location (provider location):  Off-site    Phone call duration: 22 minutes

## 2024-12-02 ENCOUNTER — TELEPHONE (OUTPATIENT)
Dept: FAMILY MEDICINE | Facility: CLINIC | Age: 74
End: 2024-12-02
Payer: MEDICARE

## 2024-12-02 NOTE — TELEPHONE ENCOUNTER
Order/Referral Request    Who is requesting: Pt    Orders being requested: Diagnostic Mammo    Reason service is needed/diagnosis: Looks like Pt has a Diagnostic Mammo about every 6 mo, her last one was done as a screening.    When are orders needed by: ASAP- Pt leaves on 12/12    Has this been discussed with Provider: Yes    Does patient have a preference on a Group/Provider/Facility? [facility]    Does patient have an appointment scheduled?: No    Where to send orders: Place orders within Epic    Okay to leave a detailed message?: Yes at Cell number on file:    Telephone Information:   Mobile [phone]

## 2024-12-03 NOTE — TELEPHONE ENCOUNTER
Called patient and relayed information/instructions from provider. Patient has no further questions at this time and will follow up as needed/instructed.    Leighton Cronin RN     [facility]

## 2024-12-03 NOTE — TELEPHONE ENCOUNTER
On the last 2 mammos - the radiologist stated she can return to yearly mammograms..  It was just the one that needed a 6 month follow up.

## 2025-01-17 ENCOUNTER — TELEPHONE (OUTPATIENT)
Dept: FAMILY MEDICINE | Facility: CLINIC | Age: 75
End: 2025-01-17
Payer: MEDICARE

## 2025-01-17 NOTE — TELEPHONE ENCOUNTER
Patient wanted to know PCPs thoughts on whether or not patient should receive the shingles vaccine Shingrix. Patients sister has a very bad case she stated right now and patient does not want to experience what her sister is  Patient has not been exposed      Kenia Singh RN

## 2025-01-20 NOTE — TELEPHONE ENCOUNTER
Called patient and relayed information/instructions from provider. Patient has no further questions at this time and will follow up as needed/instructed.    Leighton Cronin RN     [facility]

## 2025-04-03 ENCOUNTER — ANCILLARY ORDERS (OUTPATIENT)
Dept: GENERAL RADIOLOGY | Facility: CLINIC | Age: 75
End: 2025-04-03
Payer: MEDICARE

## 2025-04-03 DIAGNOSIS — Z96.612 STATUS POST SHOULDER REPLACEMENT, LEFT: Primary | ICD-10-CM

## 2025-04-28 ENCOUNTER — HOSPITAL ENCOUNTER (OUTPATIENT)
Dept: GENERAL RADIOLOGY | Facility: HOSPITAL | Age: 75
Discharge: HOME | End: 2025-04-28
Attending: NURSE PRACTITIONER | Admitting: NURSE PRACTITIONER
Payer: MEDICARE

## 2025-04-28 DIAGNOSIS — Z96.612 STATUS POST SHOULDER REPLACEMENT, LEFT: ICD-10-CM

## 2025-04-28 PROCEDURE — 73030 X-RAY EXAM OF SHOULDER: CPT | Mod: LT

## 2025-05-13 ENCOUNTER — VIRTUAL VISIT (OUTPATIENT)
Dept: ENDOCRINOLOGY | Facility: CLINIC | Age: 75
End: 2025-05-13
Payer: MEDICARE

## 2025-05-13 ENCOUNTER — TELEPHONE (OUTPATIENT)
Dept: ENDOCRINOLOGY | Facility: CLINIC | Age: 75
End: 2025-05-13

## 2025-05-13 DIAGNOSIS — M81.0 AGE-RELATED OSTEOPOROSIS WITHOUT CURRENT PATHOLOGICAL FRACTURE: Primary | ICD-10-CM

## 2025-05-13 PROCEDURE — 99207 PR NO CHARGE LOS: CPT | Performed by: STUDENT IN AN ORGANIZED HEALTH CARE EDUCATION/TRAINING PROGRAM

## 2025-05-13 PROCEDURE — 1126F AMNT PAIN NOTED NONE PRSNT: CPT | Mod: 93 | Performed by: STUDENT IN AN ORGANIZED HEALTH CARE EDUCATION/TRAINING PROGRAM

## 2025-05-13 ASSESSMENT — PAIN SCALES - GENERAL: PAINLEVEL_OUTOF10: NO PAIN (0)

## 2025-05-13 NOTE — LETTER
5/13/2025      Mirta Faria  [address]  [place] [address]      Dear Colleague,    Thank you for referring your patient, Mirta Faria, to the [facility]. Please see a copy of my visit note below.    Endocrinology Clinic Visit     Virtual Visit Details    Type of service:  Telephone Visit   Patient did not  her phone. I left her a VM to get blood draw for bone turnover sharan and dane Trujillo MD  Endocrinology, Diabetes and Metabolism  [facility]      NAME:  Mirta Faria  PCP:  Fernanda Haas  MRN:  [id number]  Reason for Consult:  osteoporosis   Requesting Provider:  Fernanda Haas    Chief Complaint     No chief complaint on file.      History of Present Illness     Mirta Faria is a 73 year old female who is seen in clinic for osteoporosis.   Her PMH is otherwise significant for 2 hip replacement due to OA. Her problem list report hypercalciuria; she had a 24 hour urine of calcium on 9/2020 was 269 mg. She was on chlorthalidone in the past.      She was first diagnosed with OP in 2016, per her notes she was started on prolia 2/2018 ( corrected from my previous note). Per chart review she was seen by Dr. Papi Bedolla 12/2016 with plan to start prolia. There is a note from yuri Martinez 12/23/2016 that she received prolia injection.She continued on prolia every 6 month, had prolia on 2/2023 and last one on 8/2023 those were done in [place]. She said most of her osteoporosis care was completed in [place]. She had IV reclast on 8/2021 ordered by Dr Papi Bedolla with plan to stop prolia. She continued to get prolia in [place]. Last visit we discussed stopping prolia. She received IV reclast 5/2024.       I reviewed her Dexa scan in records: she had one in 2007 but I am unable to see it.  DEXA scan in 2016 (only lumbar area is scanned), lowest T score of -2.6.  DEXA scan in 2018, lowest T score of -2.1 at the lumbar spine, there was  significant 6.7% increase in the bone density at the spine and 4.2% increase at the left total hip compared to 2016.  DEXA scan in November 2020 showed lowest T score of -1.7 at the lumbar spine compared to 2018 that has been 4.5% increase in the bone density at the spine and 8.9% increase at the left total hip.  Most recent DEXA scan on November 2022 showed a T score of -2 at the lumbar spine.  Radius was done for the first time which showed a T score of -3.1.  There has been 3.5% decrease in the lumbar spine. SHE IS S/P Rt and Lt hip replacement for OA, unable to complete dexa on those sites.    Most recent dexa scan 11/2024:  DXA RESULTS  -Lumbar Spine: L1-L4: BMD: 0.969 g/cm2. T-score: -1.8. Z-score: 0.0.  -RIGHT Radius 33%: BMD: 0.495 g/cm2. T-score: -3.0. Z-score: -0.8.    INTERVAL CHANGE  -There has been a 3.3% increase in lumbar spine BMD.   -There has been a 1.5% increase in the right radius 33% BMD.       Calcium intake:   Dietary: 2 glasses milk daily, 1 serving of yogurt everyday, cheese frequently  Supplements: 600 mg 1 tab daily    Vitamin D intake:   Supplements: 80 mcg daily  Last vitamin D level was 75 on 4/25/24.    Osteoporosis Risk factors:   Previous fractures: none in adulthood. Arm fx at age of 14 while horseback riding.  Family history of fragility fracture in parent: no  Current smoking: no  Steroid Use: no  Rheumatoid  arthritis: no  Alcohol (3 or more units per day): no, every other day one glass of wine  Reported loss of height: no  Menstrual history: age at menopause: early 61  Estrogen use after menopause: no  Tendency for falls: no  GI history: Malabsorption (IBD, Celiac, gastric bypass ): no  History of kidney stones: no  History of thyroid disease: no  Physical activity: walking frequently  Weight history: lost 12 lbs over the years.        Problem List     Patient Active Problem List   Diagnosis     Benign Polyps Of The Large Intestine     Hypercalciuria     Lichen Sclerosus Et  Atrophicus     Lumbar herniated disc     Osteoporosis        Medications     Current Outpatient Medications   Medication Sig Dispense Refill     acetaminophen (TYLENOL) 500 MG tablet Take 1,000 mg by mouth       ascorbic acid, vitamin C, (VITAMIN C) 250 MG tablet [ASCORBIC ACID, VITAMIN C, (VITAMIN C) 250 MG TABLET] Take 250 mg by mouth daily.       CALCIUM CARBONATE (CALCIUM 500 ORAL) [CALCIUM CARBONATE (CALCIUM 500 ORAL)] Take by mouth.       clobetasol (TEMOVATE) 0.05 % external cream [CLOBETASOL (TEMOVATE) 0.05 % CREAM] APPLY SPARINGLY TO AFFECTED AREA(S) TWICE DAILY 15 g 1     denosumab (PROLIA) 60 mg/mL injection Inject 60 mg subcutaneously.       estradiol (ESTRACE) 0.1 MG/GM vaginal cream [ESTRADIOL (ESTRACE) 0.01 % (0.1 MG/GRAM) VAGINAL CREAM] 1 gm vaginally 3 times a week 42.5 g 0     levofloxacin (LEVAQUIN) 250 MG tablet Take 1 tablet (250 mg) by mouth daily. 3 tablet 0     No current facility-administered medications for this visit.        Allergies     Allergies   Allergen Reactions     Tramadol Nausea       Medical / Surgical History     Past Medical History:   Diagnosis Date     Colon cancer (H) 2005    benign polyp removed     Urinary tract infection      Past Surgical History:   Procedure Laterality Date     ZZHC EXCISION BRACH CLFT CYST,SUPERFICIAL      Description: Surgery Excis Of Branchial Cleft Cyst Confined Skin Subcut T;  Recorded: 08/18/2008;       Social History     Social History     Socioeconomic History     Marital status:      Spouse name: Not on file     Number of children: Not on file     Years of education: Not on file     Highest education level: Not on file   Occupational History     Not on file   Tobacco Use     Smoking status: Never     Smokeless tobacco: Never   Vaping Use     Vaping status: Never Used   Substance and Sexual Activity     Alcohol use: Not on file     Drug use: Not on file     Sexual activity: Not on file   Other Topics Concern     Not on file   Social  History Narrative     Not on file     Social Drivers of Health     Financial Resource Strain: Low Risk  (2/1/2025)    Received from [facility]    Financial Resource Strain      Difficulty of Paying Living Expenses: 3      Difficulty of Paying Living Expenses: Not on file   Food Insecurity: No Food Insecurity (9/28/2024)    Received from [facility]    Food Insecurity      Do you worry your food will run out before you are able to buy more?: 1   Transportation Needs: No Transportation Needs (9/28/2024)    Received from [facility]    Transportation Needs      Does lack of transportation keep you from medical appointments?: 1      Does lack of transportation keep you from work, meetings or getting things that you need?: 1   Physical Activity: Sufficiently Active (5/20/2024)    Received from [facility]    Exercise Vital Sign      Days of Exercise per Week: 4 days      Minutes of Exercise per Session: 40 min   Stress: No Stress Concern Present (9/29/2020)    Received from [facility]    [employer] [place] [employer] - Occupational Stress Questionnaire      Feeling of Stress : Not at all   Social Connections: Socially Integrated (9/28/2024)    Received from [facility]    Social Connections      Do you often feel lonely or isolated from those around you?: 0   Interpersonal Safety: Low Risk  (7/26/2024)    Interpersonal Safety      Do you feel physically and emotionally safe where you currently live?: Yes      Within the past 12 months, have you been hit, slapped, kicked or otherwise physically hurt by someone?: No      Within the past 12 months, have you been humiliated or emotionally abused in other ways by your partner or ex-partner?: No   Housing Stability: Low Risk  (9/28/2024)    Received from [facility]    Housing Stability      What is  "your housing situation today?: 1       Family History     Family History   Problem Relation Age of Onset     Alzheimer Disease Other      Osteoporosis Other        ROS     12 ROS completed, pertinent positive and negative in HPI    Physical Exam   LMP  (LMP Unknown)    GENERAL: Healthy, alert and no distress  EYES: Eyes grossly normal to inspection.  No discharge or erythema, or obvious scleral/conjunctival abnormalities.  RESP: No audible wheeze, cough, or visible cyanosis.  No visible retractions or increased work of breathing.    SKIN: Visible skin clear. No significant rash, abnormal pigmentation or lesions.  NEURO: Cranial nerves grossly intact.  Mentation and speech appropriate for age.  PSYCH: Mentation appears normal, affect normal/bright, judgement and insight intact, normal speech and appearance well-groomed.     Labs/Imaging     Pertinent Labs were reviewed and updated in EPIC and discussed briefly.  Radiology Results were  reviewed and updated in EPIC and discussed briefly.    Summary of recent findings:   No results found for: A1C    No results found for: \"TSH\", \"T4\"    Creatinine   Date Value Ref Range Status   04/25/2024 1.04 (H) 0.51 - 0.95 mg/dL Final       Recent Labs   Lab Test 12/27/17  0937   CHOL 225*   HDL 90      TRIG 46       No results found for: \"SOFC34QYBGE\", \"[id number]\", \"[id number]\"    I personally reviewed the patient's outside records from [facility] EMR and Care Everywhere. Summary of pertinent findings in [facility].    Impression / Plan     1.  Osteoporosis  Her risk factors for osteoporosis include age and postmenopausal.  She had a fragmented care between [place] and [place], it seems that there has been a miscommunication between providers.  She received Prolia injection probably since 2016, with a plan for it to be stopped in 2021, she received IV Reclast on August 2021 but continued with Prolia injection in [place] .  She had her second IV reclast on 5/2024. her dexa " 11/2024.      Test and/or medications prescribed today:  No orders of the defined types were placed in this encounter.        Follow up: 1 year  40 minutes spent on the date of the encounter doing chart review, history and exam, documentation and further activities as noted above.       The longitudinal plan of care for the diagnosis(es)/condition(s) as documented were addressed during this visit. Due to the added complexity in care, I will continue to support Mirta in the subsequent management and with ongoing continuity of care.    Abilio Trujillo MD  Endocrinology, Diabetes and Metabolism  [facility]      Again, thank you for allowing me to participate in the care of your patient.        Sincerely,        Abilio Trujillo MD    Electronically signed

## 2025-05-13 NOTE — PROGRESS NOTES
Endocrinology Clinic Visit     Virtual Visit Details    Type of service:  Telephone Visit   Patient did not  her phone. I left her a VM to get blood draw for bone turnover sharan and dane Trujillo MD  Endocrinology, Diabetes and Metabolism  [facility]      NAME:  Mirta Faria  PCP:  Fernanda Haas  MRN:  [id number]  Reason for Consult:  osteoporosis   Requesting Provider:  Fernanda Haas    Chief Complaint     No chief complaint on file.      History of Present Illness     Mirta Faria is a 73 year old female who is seen in clinic for osteoporosis.   Her PMH is otherwise significant for 2 hip replacement due to OA. Her problem list report hypercalciuria; she had a 24 hour urine of calcium on 9/2020 was 269 mg. She was on chlorthalidone in the past.      She was first diagnosed with OP in 2016, per her notes she was started on prolia 2/2018 ( corrected from my previous note). Per chart review she was seen by Dr. Papi Bedolla 12/2016 with plan to start prolia. There is a note from yuri Martinez 12/23/2016 that she received prolia injection.She continued on prolia every 6 month, had prolia on 2/2023 and last one on 8/2023 those were done in [place]. She said most of her osteoporosis care was completed in [place]. She had IV reclast on 8/2021 ordered by Dr Papi Bedolla with plan to stop prolia. She continued to get prolia in [place]. Last visit we discussed stopping prolia. She received IV reclast 5/2024.       I reviewed her Dexa scan in records: she had one in 2007 but I am unable to see it.  DEXA scan in 2016 (only lumbar area is scanned), lowest T score of -2.6.  DEXA scan in 2018, lowest T score of -2.1 at the lumbar spine, there was significant 6.7% increase in the bone density at the spine and 4.2% increase at the left total hip compared to 2016.  DEXA scan in November 2020 showed lowest T score of -1.7 at the lumbar spine compared to 2018 that has been 4.5% increase in the  Incision/Drainage Procedure Note    Performed By:  Pankaj Fonseca MD     Assistant:  none    Anesthesia: Local     Procedure Details: The risks,benefits and alternatives  were explained and consent was obtained for the procedure. RBAs also explained. The area was cleansed with Betadine and draped in the usual manner. Local anesthesia with 1% lidocaine was infiltrated into the skin overlying the abscess. An incision using a #11 blade scalpel was made. A Small amount of pus was obtained. A culture was not obtained. The loculations and crypts within the wound were broken up with q tip. The wound was irrigated with isoto mila saline. The wound was packed with sterile gauze. A sterile dressing was then applied. Estimated Blood Loss:  Minimal           Complications:  None; patient tolerated the procedure well.            Condition: stable           Signed By: Pankaj Fonseca MD bone density at the spine and 8.9% increase at the left total hip.  Most recent DEXA scan on November 2022 showed a T score of -2 at the lumbar spine.  Radius was done for the first time which showed a T score of -3.1.  There has been 3.5% decrease in the lumbar spine. SHE IS S/P Rt and Lt hip replacement for OA, unable to complete dexa on those sites.    Most recent dexa scan 11/2024:  DXA RESULTS  -Lumbar Spine: L1-L4: BMD: 0.969 g/cm2. T-score: -1.8. Z-score: 0.0.  -RIGHT Radius 33%: BMD: 0.495 g/cm2. T-score: -3.0. Z-score: -0.8.    INTERVAL CHANGE  -There has been a 3.3% increase in lumbar spine BMD.   -There has been a 1.5% increase in the right radius 33% BMD.       Calcium intake:   Dietary: 2 glasses milk daily, 1 serving of yogurt everyday, cheese frequently  Supplements: 600 mg 1 tab daily    Vitamin D intake:   Supplements: 80 mcg daily  Last vitamin D level was 75 on 4/25/24.    Osteoporosis Risk factors:   Previous fractures: none in adulthood. Arm fx at age of 14 while horseback riding.  Family history of fragility fracture in parent: no  Current smoking: no  Steroid Use: no  Rheumatoid  arthritis: no  Alcohol (3 or more units per day): no, every other day one glass of wine  Reported loss of height: no  Menstrual history: age at menopause: early 61  Estrogen use after menopause: no  Tendency for falls: no  GI history: Malabsorption (IBD, Celiac, gastric bypass ): no  History of kidney stones: no  History of thyroid disease: no  Physical activity: walking frequently  Weight history: lost 12 lbs over the years.        Problem List     Patient Active Problem List   Diagnosis    Benign Polyps Of The Large Intestine    Hypercalciuria    Lichen Sclerosus Et Atrophicus    Lumbar herniated disc    Osteoporosis        Medications     Current Outpatient Medications   Medication Sig Dispense Refill    acetaminophen (TYLENOL) 500 MG tablet Take 1,000 mg by mouth      ascorbic acid, vitamin C, (VITAMIN C) 250 MG  tablet [ASCORBIC ACID, VITAMIN C, (VITAMIN C) 250 MG TABLET] Take 250 mg by mouth daily.      CALCIUM CARBONATE (CALCIUM 500 ORAL) [CALCIUM CARBONATE (CALCIUM 500 ORAL)] Take by mouth.      clobetasol (TEMOVATE) 0.05 % external cream [CLOBETASOL (TEMOVATE) 0.05 % CREAM] APPLY SPARINGLY TO AFFECTED AREA(S) TWICE DAILY 15 g 1    denosumab (PROLIA) 60 mg/mL injection Inject 60 mg subcutaneously.      estradiol (ESTRACE) 0.1 MG/GM vaginal cream [ESTRADIOL (ESTRACE) 0.01 % (0.1 MG/GRAM) VAGINAL CREAM] 1 gm vaginally 3 times a week 42.5 g 0    levofloxacin (LEVAQUIN) 250 MG tablet Take 1 tablet (250 mg) by mouth daily. 3 tablet 0     No current facility-administered medications for this visit.        Allergies     Allergies   Allergen Reactions    Tramadol Nausea       Medical / Surgical History     Past Medical History:   Diagnosis Date    Colon cancer (H) 2005    benign polyp removed    Urinary tract infection      Past Surgical History:   Procedure Laterality Date    ZZHC EXCISION BRACH CLFT CYST,SUPERFICIAL      Description: Surgery Excis Of Branchial Cleft Cyst Confined Skin Subcut T;  Recorded: 08/18/2008;       Social History     Social History     Socioeconomic History    Marital status:      Spouse name: Not on file    Number of children: Not on file    Years of education: Not on file    Highest education level: Not on file   Occupational History    Not on file   Tobacco Use    Smoking status: Never    Smokeless tobacco: Never   Vaping Use    Vaping status: Never Used   Substance and Sexual Activity    Alcohol use: Not on file    Drug use: Not on file    Sexual activity: Not on file   Other Topics Concern    Not on file   Social History Narrative    Not on file     Social Drivers of Health     Financial Resource Strain: Low Risk  (2/1/2025)    Received from [facility]    Financial Resource Strain     Difficulty of Paying Living Expenses: 3     Difficulty of Paying Living  Expenses: Not on file   Food Insecurity: No Food Insecurity (9/28/2024)    Received from [facility]    Food Insecurity     Do you worry your food will run out before you are able to buy more?: 1   Transportation Needs: No Transportation Needs (9/28/2024)    Received from [facility]    Transportation Needs     Does lack of transportation keep you from medical appointments?: 1     Does lack of transportation keep you from work, meetings or getting things that you need?: 1   Physical Activity: Sufficiently Active (5/20/2024)    Received from [facility]    Exercise Vital Sign     Days of Exercise per Week: 4 days     Minutes of Exercise per Session: 40 min   Stress: No Stress Concern Present (9/29/2020)    Received from [facility]    [place] [employer] - Occupational Stress Questionnaire     Feeling of Stress : Not at all   Social Connections: Socially Integrated (9/28/2024)    [facility]    Social Connections     Do you often feel lonely or isolated from those around you?: 0   Interpersonal Safety: Low Risk  (7/26/2024)    Interpersonal Safety     Do you feel physically and emotionally safe where you currently live?: Yes     Within the past 12 months, have you been hit, slapped, kicked or otherwise physically hurt by someone?: No     Within the past 12 months, have you been humiliated or emotionally abused in other ways by your partner or ex-partner?: No   Housing Stability: Low Risk  (9/28/2024)    Received from [facility] [employer]    Housing Stability     What is your housing situation today?: 1       Family History     Family History   Problem Relation Age of Onset    Alzheimer Disease Other     Osteoporosis Other        ROS     12 ROS completed, pertinent positive and negative in HPI    Physical Exam   LMP  (LMP Unknown)    GENERAL: Healthy, alert and  "no distress  EYES: Eyes grossly normal to inspection.  No discharge or erythema, or obvious scleral/conjunctival abnormalities.  RESP: No audible wheeze, cough, or visible cyanosis.  No visible retractions or increased work of breathing.    SKIN: Visible skin clear. No significant rash, abnormal pigmentation or lesions.  NEURO: Cranial nerves grossly intact.  Mentation and speech appropriate for age.  PSYCH: Mentation appears normal, affect normal/bright, judgement and insight intact, normal speech and appearance well-groomed.     Labs/Imaging     Pertinent Labs were reviewed and updated in EPIC and discussed briefly.  Radiology Results were  reviewed and updated in EPIC and discussed briefly.    Summary of recent findings:   No results found for: A1C    No results found for: \"TSH\", \"T4\"    Creatinine   Date Value Ref Range Status   04/25/2024 1.04 (H) 0.51 - 0.95 mg/dL Final       Recent Labs   Lab Test 12/27/17  0937   CHOL 225*   HDL 90      TRIG 46       No results found for: \"IHCG24TEBRZ\", \"[id number]\", \"[id number]\"    I personally reviewed the patient's outside records from [facility] EMR and Care Everywhere. Summary of pertinent findings in [facility].    Impression / Plan     1.  Osteoporosis  Her risk factors for osteoporosis include age and postmenopausal.  She had a fragmented care between [place] and [place], it seems that there has been a miscommunication between providers.  She received Prolia injection probably since 2016, with a plan for it to be stopped in 2021, she received IV Reclast on August 2021 but continued with Prolia injection in [place] .  She had her second IV reclast on 5/2024. her dexa 11/2024.      Test and/or medications prescribed today:  No orders of the defined types were placed in this encounter.        Follow up: 1 year  40 minutes spent on the date of the encounter doing chart review, history and exam, documentation and further activities as noted above.       The longitudinal plan " of care for the diagnosis(es)/condition(s) as documented were addressed during this visit. Due to the added complexity in care, I will continue to support Mirta in the subsequent management and with ongoing continuity of care.    Abilio Trujillo MD  Endocrinology, Diabetes and Metabolism  [facility]

## 2025-05-13 NOTE — NURSING NOTE
Current patient location: [facility]  [facility] [address]    Is the patient currently in the state of [place]? YES    Visit mode: TELEPHONE    If the visit is dropped, the patient can be reconnected by:TELEPHONE VISIT: Phone number: [phone]    Will anyone else be joining the visit? NO  (If patient encounters technical issues they should call [phone]    Are changes needed to the allergy or medication list? No    Are refills needed on medications prescribed by this physician? NO    Rooming Documentation:  Questionnaire(s) completed    Reason for visit: STAR Cardenas VVF

## 2025-05-13 NOTE — TELEPHONE ENCOUNTER
Patient called frustrated that she did not get a phone call from Dr. Trujillo. She was waiting for over an hour for a phone call from Cassandra. She rescheduled to 07/09/2025 but is not happy. She needs a call back from the clinic team as to what happened because she was checked in on time.

## 2025-05-13 NOTE — TELEPHONE ENCOUNTER
Patient needs to be rescheduled for their virtual visit due to Reason for Reschedule: No-Show    Appointment mode: Telephone  Provider: Abilio Trujillo MD

## 2025-05-15 NOTE — TELEPHONE ENCOUNTER
Looks like Dr. Trujillo tried calling her and she did not answer.           Thanks,  PADMINI Barrett

## 2025-05-15 NOTE — TELEPHONE ENCOUNTER
Called patient, no answer. Left a voicemail that Dr. Trujillo did try to call the patient but got her voicemail. Instructed the patient to have the requested blood work done and the clinic can assist with rescheduling her for a sooner appointment. Left number for the patient to call the clinic back.    Isabel Leal CMA  Adult Endocrinology  [facility] [place]

## 2025-05-20 ENCOUNTER — OFFICE VISIT (OUTPATIENT)
Dept: FAMILY MEDICINE | Facility: CLINIC | Age: 75
End: 2025-05-20
Payer: MEDICARE

## 2025-05-20 VITALS
BODY MASS INDEX: 17.43 KG/M2 | WEIGHT: 115 LBS | HEIGHT: 68 IN | RESPIRATION RATE: 16 BRPM | TEMPERATURE: 97.6 F | DIASTOLIC BLOOD PRESSURE: 81 MMHG | OXYGEN SATURATION: 96 % | HEART RATE: 72 BPM | SYSTOLIC BLOOD PRESSURE: 127 MMHG

## 2025-05-20 DIAGNOSIS — Z00.00 ENCOUNTER FOR MEDICARE ANNUAL WELLNESS EXAM: Primary | ICD-10-CM

## 2025-05-20 DIAGNOSIS — L94.0 CIRCUMSCRIBED SCLERODERMA: ICD-10-CM

## 2025-05-20 DIAGNOSIS — Z13.6 SCREENING FOR CARDIOVASCULAR CONDITION: ICD-10-CM

## 2025-05-20 DIAGNOSIS — M81.0 AGE-RELATED OSTEOPOROSIS WITHOUT CURRENT PATHOLOGICAL FRACTURE: ICD-10-CM

## 2025-05-20 DIAGNOSIS — Z11.59 NEED FOR HEPATITIS C SCREENING TEST: ICD-10-CM

## 2025-05-20 LAB
ALBUMIN SERPL BCG-MCNC: 4.6 G/DL (ref 3.5–5.2)
ALP SERPL-CCNC: 57 U/L (ref 40–150)
ALT SERPL W P-5'-P-CCNC: 13 U/L (ref 0–50)
ANION GAP SERPL CALCULATED.3IONS-SCNC: 10 MMOL/L (ref 7–15)
AST SERPL W P-5'-P-CCNC: 25 U/L (ref 0–45)
BILIRUB SERPL-MCNC: 0.6 MG/DL
BUN SERPL-MCNC: 17.2 MG/DL (ref 8–23)
CALCIUM SERPL-MCNC: 9.4 MG/DL (ref 8.8–10.4)
CHLORIDE SERPL-SCNC: 100 MMOL/L (ref 98–107)
CHOLEST SERPL-MCNC: 236 MG/DL
CREAT SERPL-MCNC: 0.84 MG/DL (ref 0.51–0.95)
EGFRCR SERPLBLD CKD-EPI 2021: 73 ML/MIN/1.73M2
ERYTHROCYTE [DISTWIDTH] IN BLOOD BY AUTOMATED COUNT: 12.8 % (ref 10–15)
FASTING STATUS PATIENT QL REPORTED: YES
FASTING STATUS PATIENT QL REPORTED: YES
GLUCOSE SERPL-MCNC: 86 MG/DL (ref 70–99)
HCO3 SERPL-SCNC: 28 MMOL/L (ref 22–29)
HCT VFR BLD AUTO: 41.8 % (ref 35–47)
HCV AB SERPL QL IA: NONREACTIVE
HDLC SERPL-MCNC: 90 MG/DL
HGB BLD-MCNC: 13.5 G/DL (ref 11.7–15.7)
LDLC SERPL CALC-MCNC: 133 MG/DL
MCH RBC QN AUTO: 31.2 PG (ref 26.5–33)
MCHC RBC AUTO-ENTMCNC: 32.3 G/DL (ref 31.5–36.5)
MCV RBC AUTO: 97 FL (ref 78–100)
NONHDLC SERPL-MCNC: 146 MG/DL
PLATELET # BLD AUTO: 189 10E3/UL (ref 150–450)
POTASSIUM SERPL-SCNC: 3.9 MMOL/L (ref 3.4–5.3)
PROT SERPL-MCNC: 6.8 G/DL (ref 6.4–8.3)
RBC # BLD AUTO: 4.33 10E6/UL (ref 3.8–5.2)
SODIUM SERPL-SCNC: 138 MMOL/L (ref 135–145)
TRIGL SERPL-MCNC: 64 MG/DL
WBC # BLD AUTO: 4.1 10E3/UL (ref 4–11)

## 2025-05-20 PROCEDURE — 99000 SPECIMEN HANDLING OFFICE-LAB: CPT | Performed by: FAMILY MEDICINE

## 2025-05-20 PROCEDURE — 99213 OFFICE O/P EST LOW 20 MIN: CPT | Mod: 25 | Performed by: FAMILY MEDICINE

## 2025-05-20 PROCEDURE — G2211 COMPLEX E/M VISIT ADD ON: HCPCS | Performed by: FAMILY MEDICINE

## 2025-05-20 PROCEDURE — 85027 COMPLETE CBC AUTOMATED: CPT | Mod: GZ | Performed by: FAMILY MEDICINE

## 2025-05-20 PROCEDURE — 36415 COLL VENOUS BLD VENIPUNCTURE: CPT | Performed by: FAMILY MEDICINE

## 2025-05-20 PROCEDURE — G0438 PPPS, INITIAL VISIT: HCPCS | Performed by: FAMILY MEDICINE

## 2025-05-20 PROCEDURE — 80053 COMPREHEN METABOLIC PANEL: CPT | Performed by: FAMILY MEDICINE

## 2025-05-20 PROCEDURE — 82523 COLLAGEN CROSSLINKS: CPT | Mod: 90 | Performed by: FAMILY MEDICINE

## 2025-05-20 PROCEDURE — 3079F DIAST BP 80-89 MM HG: CPT | Performed by: FAMILY MEDICINE

## 2025-05-20 PROCEDURE — 3074F SYST BP LT 130 MM HG: CPT | Performed by: FAMILY MEDICINE

## 2025-05-20 PROCEDURE — 86803 HEPATITIS C AB TEST: CPT | Performed by: FAMILY MEDICINE

## 2025-05-20 PROCEDURE — 80061 LIPID PANEL: CPT | Performed by: FAMILY MEDICINE

## 2025-05-20 SDOH — HEALTH STABILITY: PHYSICAL HEALTH: ON AVERAGE, HOW MANY DAYS PER WEEK DO YOU ENGAGE IN MODERATE TO STRENUOUS EXERCISE (LIKE A BRISK WALK)?: 5 DAYS

## 2025-05-20 ASSESSMENT — SOCIAL DETERMINANTS OF HEALTH (SDOH): HOW OFTEN DO YOU GET TOGETHER WITH FRIENDS OR RELATIVES?: TWICE A WEEK

## 2025-05-20 NOTE — PROGRESS NOTES
Preventive Care Visit  [facility]  Fernanda Haas MD, Family Medicine  May 20, 2025      1. Encounter for Medicare annual wellness exam (Primary)  Mirta is in today for her annual wellness exam.  She has her mammogram scheduled for June.  Her bone density is up-to-date.  She had a colonoscopy in 2020 and she is next due in 2030 potentially.  We discussed vaccines and she declines pneumonia vaccine.  We discussed the shingles vaccine and she will get that at the pharmacy.    2. Screening for cardiovascular condition    - Lipid panel reflex to direct LDL Fasting; Future    3. Need for hepatitis C screening test  Low risk screening  - Hepatitis C Screen Reflex to HCV RNA Quant and Genotype; Future    4. Age-related osteoporosis without current pathological fracture  She is following with endocrinology and it looks like she most recently had Reclast.  She feels like she is getting good dietary calcium and weightbearing exercise.  - CBC with platelets; Future  - Comprehensive metabolic panel; Future    5. Lichen Sclerosus Et Atrophicus  Stable with current creams.          Appropriate preventive services were addressed with this patient via screening, questionnaire, or discussion as appropriate for fall prevention, nutrition, physical activity, Tobacco-use cessation, social engagement, weight loss and cognition.  Checklist reviewing preventive services available has been given to the patient.  Reviewed patient's diet, addressing concerns and/or questions.   The patient was instructed to see the dentist every 6 months.   The patient was provided with written information regarding signs of hearing loss.   I have reviewed Opioid Use Disorder and Substance Use Disorder risk factors and made any needed referrals.     Subjective   Mirta is a 75 year old, presenting for the following:  Physical (Fasting. Bone density. )        5/20/2025     8:16 AM   Additional Questions   Roomed by WILLIAM          HPI  She comes in today for annual exam.  She states overall she is doing well.  She has most recently had a shoulder surgery and is recovering.  She spends half her time in [place].  She does follow with endocrinology for her osteoporosis.  We discussed vaccines.    The longitudinal plan of care for the diagnosis(es)/condition(s) as documented were addressed during this visit. Due to the added complexity in care, I will continue to support Mirta in the subsequent management and with ongoing continuity of care.          Advance Care Planning    Discussed advance care planning with patient; informed AVS has link to [facility].        5/20/2025   General Health   How would you rate your overall physical health? Good   Feel stress (tense, anxious, or unable to sleep) Not at all         5/20/2025   Nutrition   Diet: Regular (no restrictions)         5/20/2025   Exercise   Days per week of moderate/strenous exercise 5 days         5/20/2025   Social Factors   Frequency of gathering with friends or relatives Twice a week   Worry food won't last until get money to buy more No   Food not last or not have enough money for food? No   Do you have housing? (Housing is defined as stable permanent housing and does not include staying outside in a car, in a tent, in an abandoned building, in an overnight shelter, or couch-surfing.) Yes   Are you worried about losing your housing? No   Lack of transportation? No   Unable to get utilities (heat,electricity)? No         5/20/2025   Fall Risk   Fallen 2 or more times in the past year? No   Trouble with walking or balance? No          5/20/2025   Activities of Daily Living- Home Safety   Needs help with the following daily activites None of the above   Safety concerns in the home None of the above         5/20/2025   Dental   Dentist two times every year? (!) NO         5/20/2025   Hearing Screening   Hearing concerns? None of the above         5/20/2025   Driving Risk  Screening   Patient/family members have concerns about driving No         5/20/2025   General Alertness/Fatigue Screening   Have you been more tired than usual lately? No         5/20/2025   Urinary Incontinence Screening   Bothered by leaking urine in past 6 months No         Today's PHQ-2 Score:       5/20/2025     8:20 AM   PHQ-2 ( 1999 Pf[employer])   Q1: Little interest or pleasure in doing things 0   Q2: Feeling down, depressed or hopeless 0   PHQ-2 Score 0    Q1: Little interest or pleasure in doing things Not at all   Q2: Feeling down, depressed or hopeless Not at all   PHQ-2 Score 0       Patient-reported           5/20/2025   Substance Use   Alcohol more than 3/day or more than 7/wk No   Do you have a current opioid prescription? No   How severe/bad is pain from 1 to 10? 4/10   Do you use any other substances recreationally? No     Social History     Tobacco Use    Smoking status: Never    Smokeless tobacco: Never   Vaping Use    Vaping status: Never Used           6/5/2024   LAST FHS-7 RESULTS   1st degree relative breast or ovarian cancer No   Any relative bilateral breast cancer No   Any male have breast cancer No   Any ONE woman have BOTH breast AND ovarian cancer No   Any woman with breast cancer before 50yrs No   2 or more relatives with breast AND/OR ovarian cancer No   2 or more relatives with breast AND/OR bowel cancer No        Mammogram Screening - Mammogram every 1-2 years updated in Health Maintenance based on mutual decision making    ASCVD Risk   The ASCVD Risk score (Henrik MORALES, et al., 2019) failed to calculate for the following reasons:    Cannot find a previous HDL lab    Cannot find a previous total cholesterol lab            Reviewed and updated as needed this visit by Provider                    Lab work is in process  Current Outpatient Medications   Medication Sig Dispense Refill    ascorbic acid, vitamin C, (VITAMIN C) 250 MG tablet [ASCORBIC ACID, VITAMIN C, (VITAMIN C) 250 MG  "TABLET] Take 250 mg by mouth daily.      CALCIUM CARBONATE (CALCIUM 500 ORAL) [CALCIUM CARBONATE (CALCIUM 500 ORAL)] Take by mouth.      clobetasol (TEMOVATE) 0.05 % external cream [CLOBETASOL (TEMOVATE) 0.05 % CREAM] APPLY SPARINGLY TO AFFECTED AREA(S) TWICE DAILY 15 g 1    estradiol (ESTRACE) 0.1 MG/GM vaginal cream [ESTRADIOL (ESTRACE) 0.01 % (0.1 MG/GRAM) VAGINAL CREAM] 1 gm vaginally 3 times a week 42.5 g 0     Current providers sharing in care for this patient include:  Patient Care Team:  Fernanda Haas MD as PCP - General (Family Medicine)  Papi Bedolla MD (Inactive) (Family Medicine)  Abilio Trujillo MD as MD (Endocrinology, Diabetes, and Metabolism)  Abilio Trujillo MD as Assigned Endocrinology Provider  Roopa Guillaume APRN CNP as Assigned PCP  Dorothy Chan PA-C as Assigned Surgical Provider    The following health maintenance items are reviewed in Epic and correct as of today:  Health Maintenance   Topic Date Due    HEPATITIS C SCREENING  Never done    ZOSTER IMMUNIZATION (2 of 3) 11/14/2011    Pneumococcal Vaccine: 50+ Years (2 of 2 - PCV) 12/22/2017    LIPID  12/27/2022    MEDICARE ANNUAL WELLNESS VISIT  04/21/2024    COVID-19 Vaccine (2 - 2024-25 season) 09/01/2024    ANNUAL REVIEW OF HM ORDERS  10/25/2025    DIABETES SCREENING  07/06/2025    RSV VACCINE (1 - 1-dose 75+ series) 04/30/2026    FALL RISK ASSESSMENT  05/20/2026    MAMMO SCREENING  06/05/2026    DTAP/TDAP/TD IMMUNIZATION (7 - Td or Tdap) 06/04/2028    ADVANCE CARE PLANNING  07/26/2029    COLORECTAL CANCER SCREENING  08/04/2030    DEXA  11/21/2039    PHQ-2 (once per calendar year)  Completed    HPV IMMUNIZATION  Aged Out    MENINGITIS IMMUNIZATION  Aged Out    INFLUENZA VACCINE  Discontinued         Review of Systems  Constitutional, HEENT, cardiovascular, pulmonary, gi and gu systems are negative, except as otherwise noted.     Objective    Exam  /81   Pulse 72   Temp 97.6  F (36.4  C)   Resp 16   Ht 1.729 m (5' 8.09\") " "  Wt 52.2 kg (115 lb)   LMP  (LMP Unknown)   SpO2 96%   BMI 17.44 kg/m     Estimated body mass index is 17.44 kg/m  as calculated from the following:    Height as of this encounter: 1.729 m (5' 8.09\").    Weight as of this encounter: 52.2 kg (115 lb).    Physical Exam  GENERAL: alert and no distress  EYES: Eyes grossly normal to inspection, PERRL and conjunctivae and sclerae normal  HENT: ear canals and TM's normal, nose and mouth without ulcers or lesions  NECK: no adenopathy, no asymmetry, masses, or scars  RESP: lungs clear to auscultation - no rales, rhonchi or wheezes  CV: regular rate and rhythm, normal S1 S2, no S3 or S4, no murmur, click or rub, no peripheral edema  ABDOMEN: soft, nontender, no hepatosplenomegaly, no masses and bowel sounds normal  MS: no gross musculoskeletal defects noted, no edema  SKIN: no suspicious lesions or rashes  NEURO: Normal strength and tone, mentation intact and speech normal  PSYCH: mentation appears normal, affect normal/bright         5/20/2025   Mini Cog   Clock Draw Score 2 Normal   3 Item Recall 2 objects recalled   Mini Cog Total Score 4              Signed Electronically by: Fernanda Haas MD    "

## 2025-05-20 NOTE — PATIENT INSTRUCTIONS
Patient Education   Preventive Care Advice   This is general advice given by our system to help you stay healthy. However, your care team may have specific advice just for you. Please talk to your care team about your preventive care needs.  Nutrition  Eat 5 or more servings of fruits and vegetables each day.  Try wheat bread, brown rice and whole grain pasta (instead of white bread, rice, and pasta).  Get enough calcium and vitamin D. Check the label on foods and aim for 100% of the RDA (recommended daily allowance).  Lifestyle  Exercise at least 150 minutes each week  (30 minutes a day, 5 days a week).  Do muscle strengthening activities 2 days a week. These help control your weight and prevent disease.  No smoking.  Wear sunscreen to prevent skin cancer.  Have a dental exam and cleaning every 6 months.  Yearly exams  See your health care team every year to talk about:  Any changes in your health.  Any medicines your care team has prescribed.  Preventive care, family planning, and ways to prevent chronic diseases.  Shots (vaccines)   HPV shots (up to age 26), if you've never had them before.  Hepatitis B shots (up to age 59), if you've never had them before.  COVID-19 shot: Get this shot when it's due.  Flu shot: Get a flu shot every year.  Tetanus shot: Get a tetanus shot every 10 years.  Pneumococcal, hepatitis A, and RSV shots: Ask your care team if you need these based on your risk.  Shingles shot (for age 50 and up)  General health tests  Diabetes screening:  Starting at age 35, Get screened for diabetes at least every 3 years.  If you are younger than age 35, ask your care team if you should be screened for diabetes.  Cholesterol test: At age 39, start having a cholesterol test every 5 years, or more often if advised.  Bone density scan (DEXA): At age 50, ask your care team if you should have this scan for osteoporosis (brittle bones).  Hepatitis C: Get tested at least once in your life.  STIs (sexually  transmitted infections)  Before age 24: Ask your care team if you should be screened for STIs.  After age 24: Get screened for STIs if you're at risk. You are at risk for STIs (including HIV) if:  You are sexually active with more than one person.  You don't use condoms every time.  You or a partner was diagnosed with a sexually transmitted infection.  If you are at risk for HIV, ask about PrEP medicine to prevent HIV.  Get tested for HIV at least once in your life, whether you are at risk for HIV or not.  Cancer screening tests  Cervical cancer screening: If you have a cervix, begin getting regular cervical cancer screening tests starting at age 21.  Breast cancer scan (mammogram): If you've ever had breasts, begin having regular mammograms starting at age 40. This is a scan to check for breast cancer.  Colon cancer screening: It is important to start screening for colon cancer at age 45.  Have a colonoscopy test every 10 years (or more often if you're at risk) Or, ask your provider about stool tests like a FIT test every year or Cologuard test every 3 years.  To learn more about your testing options, visit:   .  For help making a decision, visit:   https://bit.ly/yi07149.  Prostate cancer screening test: If you have a prostate, ask your care team if a prostate cancer screening test (PSA) at age 55 is right for you.  Lung cancer screening: If you are a current or former smoker ages 50 to 80, ask your care team if ongoing lung cancer screenings are right for you.  For informational purposes only. Not to replace the advice of your health care provider. Copyright   2023 [employer]. All rights reserved. Clinically reviewed by the [facility]. Sp[employer] [id number] - REV 01/24.

## 2025-05-21 ENCOUNTER — PATIENT OUTREACH (OUTPATIENT)
Dept: GASTROENTEROLOGY | Facility: CLINIC | Age: 75
End: 2025-05-21
Payer: MEDICARE

## 2025-05-21 LAB — COLLAGEN CTX SERPL-MCNC: 494 PG/ML

## 2025-05-21 NOTE — PROGRESS NOTES
Patient will be due for colorectal cancer screening-surveillance after the age of 75. High risk episode will be resolved. Will not be managed by the Colorectal Cancer Screening team. PCP to continue to address.

## 2025-05-22 ENCOUNTER — RESULTS FOLLOW-UP (OUTPATIENT)
Dept: FAMILY MEDICINE | Facility: CLINIC | Age: 75
End: 2025-05-22
Payer: MEDICARE

## 2025-05-22 LAB
COLLAGEN NTX/CREAT UR-SRTO: 30
CREAT UR-MCNC: 63 MG/DL

## 2025-05-23 ENCOUNTER — RESULTS FOLLOW-UP (OUTPATIENT)
Dept: ENDOCRINOLOGY | Facility: CLINIC | Age: 75
End: 2025-05-23

## 2025-06-09 ENCOUNTER — ANCILLARY PROCEDURE (OUTPATIENT)
Dept: MAMMOGRAPHY | Facility: CLINIC | Age: 75
End: 2025-06-09
Attending: FAMILY MEDICINE
Payer: MEDICARE

## 2025-06-09 DIAGNOSIS — Z12.31 VISIT FOR SCREENING MAMMOGRAM: ICD-10-CM

## 2025-06-09 PROCEDURE — 77067 SCR MAMMO BI INCL CAD: CPT

## 2025-07-09 ENCOUNTER — VIRTUAL VISIT (OUTPATIENT)
Dept: ENDOCRINOLOGY | Facility: CLINIC | Age: 75
End: 2025-07-09
Payer: MEDICARE

## 2025-07-09 DIAGNOSIS — M81.0 AGE-RELATED OSTEOPOROSIS WITHOUT CURRENT PATHOLOGICAL FRACTURE: Primary | ICD-10-CM

## 2025-07-09 PROCEDURE — G2211 COMPLEX E/M VISIT ADD ON: HCPCS | Performed by: STUDENT IN AN ORGANIZED HEALTH CARE EDUCATION/TRAINING PROGRAM

## 2025-07-09 PROCEDURE — 98015 SYNCH AUDIO-ONLY EST HIGH 40: CPT | Performed by: STUDENT IN AN ORGANIZED HEALTH CARE EDUCATION/TRAINING PROGRAM

## 2025-07-09 PROCEDURE — 1126F AMNT PAIN NOTED NONE PRSNT: CPT | Mod: 93 | Performed by: STUDENT IN AN ORGANIZED HEALTH CARE EDUCATION/TRAINING PROGRAM

## 2025-07-09 RX ORDER — DIPHENHYDRAMINE HYDROCHLORIDE 50 MG/ML
50 INJECTION, SOLUTION INTRAMUSCULAR; INTRAVENOUS
Start: 2025-07-16

## 2025-07-09 RX ORDER — DIPHENHYDRAMINE HYDROCHLORIDE 50 MG/ML
25 INJECTION, SOLUTION INTRAMUSCULAR; INTRAVENOUS
Start: 2025-07-16

## 2025-07-09 RX ORDER — ALBUTEROL SULFATE 0.83 MG/ML
2.5 SOLUTION RESPIRATORY (INHALATION)
OUTPATIENT
Start: 2025-07-16

## 2025-07-09 RX ORDER — ACETAMINOPHEN 325 MG/1
650 TABLET ORAL
OUTPATIENT
Start: 2025-07-16

## 2025-07-09 RX ORDER — HEPARIN SODIUM (PORCINE) LOCK FLUSH IV SOLN 100 UNIT/ML 100 UNIT/ML
5 SOLUTION INTRAVENOUS
OUTPATIENT
Start: 2025-07-16

## 2025-07-09 RX ORDER — METHYLPREDNISOLONE SODIUM SUCCINATE 40 MG/ML
40 INJECTION INTRAMUSCULAR; INTRAVENOUS
Start: 2025-07-16

## 2025-07-09 RX ORDER — MEPERIDINE HYDROCHLORIDE 25 MG/ML
25 INJECTION INTRAMUSCULAR; INTRAVENOUS; SUBCUTANEOUS
OUTPATIENT
Start: 2025-07-16

## 2025-07-09 RX ORDER — ALBUTEROL SULFATE 90 UG/1
1-2 INHALANT RESPIRATORY (INHALATION)
Start: 2025-07-16

## 2025-07-09 RX ORDER — ZOLEDRONIC ACID 0.05 MG/ML
5 INJECTION, SOLUTION INTRAVENOUS ONCE
Start: 2025-07-16

## 2025-07-09 RX ORDER — EPINEPHRINE 1 MG/ML
0.3 INJECTION, SOLUTION INTRAMUSCULAR; SUBCUTANEOUS EVERY 5 MIN PRN
OUTPATIENT
Start: 2025-07-16

## 2025-07-09 RX ORDER — HEPARIN SODIUM,PORCINE 10 UNIT/ML
5-20 VIAL (ML) INTRAVENOUS DAILY PRN
OUTPATIENT
Start: 2025-07-16

## 2025-07-09 NOTE — LETTER
7/9/2025      Mirta Faria  [address]  [facility] [address]      Dear Colleague,    Thank you for referring your patient, Mirta Faria, to the [facility]. Please see a copy of my visit note below.    Virtual Visit Details    Type of service:  Telephone Visit   Phone call duration: 19 minutes   Originating Location (pt. Location): Home    Distant Location (provider location):  Off-site  Telephone visit completed due to the patient did not have access to video, while the distant provider did.             NAME:  Mirta Faria  PCP:  Fernanda Haas  MRN:  [id number]  Reason for Consult:  osteoporosis   Requesting Provider:  Fernanda Haas    Chief Complaint     Chief Complaint   Patient presents with     RECHECK       History of Present Illness     Mirta Faria is a 73 year old female who is seen in clinic for osteoporosis follow-up, last seen 5/2024.     Her PMH is otherwise significant for 2 hip replacement due to OA. Her problem list report hypercalciuria; she had a 24 hour urine of calcium on 9/2020 was 269 mg. She was on chlorthalidone in the past.      She was first diagnosed with OP in 2016, per her notes she was started on prolia 1/2018 ( corrected from my previous note). Per chart review she was seen by Dr. Papi Bedolla 12/2016 with plan to start prolia. There is a note from yuri Martinez 12/23/2016 that she received prolia injection.She continued on prolia every 6 month, had prolia on 2/2023 and last one on 8/2023 those were done in [place]. She said most of her osteoporosis care was completed in [place]. She had IV reclast on 8/2021 ordered by Dr Papi Bedolla with plan to stop prolia. She continued to get prolia in [place] ( per her note as reported below she received a total of 7 shots). Last visit we discussed stopping prolia. She received IV reclast 5/2024.     In summary of OP treatment:  Prolia:  1/2018 7/2018 2/2020 8/2020 2/2021  Reclast  8/2021  Prolia   2/2023 8/2023  Reclast 5/2024      I reviewed her Dexa scan in records: she had one in 2007 but I am unable to see it.  DEXA scan in 2016 (only lumbar area is scanned), lowest T score of -2.6.  DEXA scan in 2018, lowest T score of -2.1 at the lumbar spine, there was significant 6.7% increase in the bone density at the spine and 4.2% increase at the left total hip compared to 2016.  DEXA scan in November 2020 showed lowest T score of -1.7 at the lumbar spine compared to 2018 that has been 4.5% increase in the bone density at the spine and 8.9% increase at the left total hip.  Most recent DEXA scan on November 2022 showed a T score of -2 at the lumbar spine.  Radius was done for the first time which showed a T score of -3.1.  There has been 3.5% decrease in the lumbar spine. SHE IS S/P Rt and Lt hip replacement for OA, unable to complete dexa on those sites.    Most recent dexa scan 11/2024:  DXA RESULTS  -Lumbar Spine: L1-L4: BMD: 0.969 g/cm2. T-score: -1.8. Z-score: 0.0.  -RIGHT Radius 33%: BMD: 0.495 g/cm2. T-score: -3.0. Z-score: -0.8.    INTERVAL CHANGE  -There has been a 3.3% increase in lumbar spine BMD.   -There has been a 1.5% increase in the right radius 33% BMD.       Bone turnover markers:  April 2024 urine N-telopeptide 16.  May 2025 urine N-telopeptide 30  April 2024 C telopeptide 168, May 2025 C telopeptide 494    Calcium intake:   Dietary: 2 glasses milk daily, 1 serving of yogurt everyday, cheese frequently  Supplements: 600 mg 1 tab daily    Vitamin D intake:   Supplements: 80 mcg daily  Last vitamin D level was 75 on 4/25/24.    Osteoporosis Risk factors:   Previous fractures: none in adulthood. Arm fx at age of 14 while horseback riding.  Family history of fragility fracture in parent: no  Current smoking: no  Steroid Use: no  Rheumatoid  arthritis: no  Alcohol (3 or more units per day): no, every other day one glass of wine  Reported loss of height: no  Menstrual history: age at  menopause: early 61  Estrogen use after menopause: no  Tendency for falls: no  GI history: Malabsorption (IBD, Celiac, gastric bypass ): no  History of kidney stones: no  History of thyroid disease: no  Physical activity: walking frequently  Weight history: lost 12 lbs over the years.        Problem List     Patient Active Problem List   Diagnosis     Benign Polyps Of The Large Intestine     Hypercalciuria     Lichen Sclerosus Et Atrophicus     Lumbar herniated disc     Osteoporosis        Medications     Current Outpatient Medications   Medication Sig Dispense Refill     ascorbic acid, vitamin C, (VITAMIN C) 250 MG tablet [ASCORBIC ACID, VITAMIN C, (VITAMIN C) 250 MG TABLET] Take 250 mg by mouth daily.       CALCIUM CARBONATE (CALCIUM 500 ORAL) [CALCIUM CARBONATE (CALCIUM 500 ORAL)] Take by mouth.       clobetasol (TEMOVATE) 0.05 % external cream [CLOBETASOL (TEMOVATE) 0.05 % CREAM] APPLY SPARINGLY TO AFFECTED AREA(S) TWICE DAILY 15 g 1     estradiol (ESTRACE) 0.1 MG/GM vaginal cream [ESTRADIOL (ESTRACE) 0.01 % (0.1 MG/GRAM) VAGINAL CREAM] 1 gm vaginally 3 times a week 42.5 g 0     No current facility-administered medications for this visit.        Allergies     Allergies   Allergen Reactions     Tramadol Nausea       Medical / Surgical History     Past Medical History:   Diagnosis Date     Colon cancer (H) 2005    benign polyp removed     Urinary tract infection      Past Surgical History:   Procedure Laterality Date     ZZ EXCISION BRACH CLFT CYST,SUPERFICIAL      Description: Surgery Excis Of Branchial Cleft Cyst Confined Skin Subcut T;  Recorded: 08/18/2008;       Social History     Social History     Socioeconomic History     Marital status:      Spouse name: Not on file     Number of children: Not on file     Years of education: Not on file     Highest education level: Not on file   Occupational History     Not on file   Tobacco Use     Smoking status: Never     Smokeless tobacco: Never   Vaping Use      Vaping status: Never Used   Substance and Sexual Activity     Alcohol use: Not on file     Drug use: Not on file     Sexual activity: Not on file   Other Topics Concern     Not on file   Social History Narrative     Not on file     Social Drivers of Health     Financial Resource Strain: Low Risk  (5/20/2025)    Financial Resource Strain      Within the past 12 months, have you or your family members you live with been unable to get utilities (heat, electricity) when it was really needed?: No   Food Insecurity: Low Risk  (5/20/2025)    Food Insecurity      Within the past 12 months, did you worry that your food would run out before you got money to buy more?: No      Within the past 12 months, did the food you bought just not last and you didn t have money to get more?: No   Transportation Needs: Low Risk  (5/20/2025)    Transportation Needs      Within the past 12 months, has lack of transportation kept you from medical appointments, getting your medicines, non-medical meetings or appointments, work, or from getting things that you need?: No   Physical Activity: Unknown (5/20/2025)    Exercise Vital Sign      Days of Exercise per Week: 5 days      Minutes of Exercise per Session: Not on file   Stress: No Stress Concern Present (5/20/2025)    [employer] - Occupational Stress Questionnaire      Feeling of Stress : Not at all   Social Connections: Unknown (5/20/2025)    Social Connection and Isolation Panel [NHANES]      Frequency of Communication with Friends and Family: Not on file      Frequency of Social Gatherings with Friends and Family: Twice a week      Attends [employer] Services: Not on file      Active Member of Clubs or Organizations: Not on file      Attends Club or Organization Meetings: Not on file      Marital Status: Not on file   Interpersonal Safety: Low Risk  (5/20/2025)    Interpersonal Safety      Do you feel physically and emotionally safe where you currently  "live?: Yes      Within the past 12 months, have you been hit, slapped, kicked or otherwise physically hurt by someone?: No      Within the past 12 months, have you been humiliated or emotionally abused in other ways by your partner or ex-partner?: No   Housing Stability: Low Risk  (5/20/2025)    Housing Stability      Do you have housing? : Yes      Are you worried about losing your housing?: No       Family History     Family History   Problem Relation Age of Onset     Alzheimer Disease Other      Osteoporosis Other        ROS     12 ROS completed, pertinent positive and negative in HPI    Physical Exam   LMP  (LMP Unknown)    GENERAL: Healthy, alert and no distress  EYES: Eyes grossly normal to inspection.  No discharge or erythema, or obvious scleral/conjunctival abnormalities.  RESP: No audible wheeze, cough, or visible cyanosis.  No visible retractions or increased work of breathing.    SKIN: Visible skin clear. No significant rash, abnormal pigmentation or lesions.  NEURO: Cranial nerves grossly intact.  Mentation and speech appropriate for age.  PSYCH: Mentation appears normal, affect normal/bright, judgement and insight intact, normal speech and appearance well-groomed.     Labs/Imaging     Pertinent Labs were reviewed and updated in EPIC and discussed briefly.  Radiology Results were  reviewed and updated in EPIC and discussed briefly.    Summary of recent findings:   No results found for: A1C    No results found for: \"TSH\", \"T4\"    Creatinine   Date Value Ref Range Status   05/20/2025 0.84 0.51 - 0.95 mg/dL Final       Recent Labs   Lab Test 12/27/17  0937   CHOL 225*   HDL 90      TRIG 46       No results found for: \"[id number]\", \"[id number]\", \"[id number]\"    I personally reviewed the patient's outside records from [facility] EMR and Care Everywhere. Summary of pertinent findings in HPI.    Impression / Plan     1.  Osteoporosis  Her risk factors for osteoporosis include age and postmenopausal.  She had " a fragmented care between [place] and [place], affecting her scheduled on Prolia injections and osteoporosis care.  She received Prolia injection with interrupted therapy (unclear start date probably 2018), with a plan for it to be stopped in 2021, she received IV Reclast on August 2021 but continued with Prolia injection in [place].  Last Prolia injection August 2023.  She had her second IV reclast on 5/2024. her dexa 11/2024 limited to the wrist and lumbar spine, overall stable BMD with lowest T-score of -3 at the wrist.  We discussed her osteoporosis medication options.  Her bone turnover markers from May 2025 are not suppressed, with trend up compared to a year ago.  We will plan on completing Reclast course of 3 years and getting her third dose of Reclast.  Discussed 1/3 risk of flu symptoms (acute phase reaction) after treatment with IV zoledronic acid. Discussed risks of osteonecrosis of the jaw (1/200 after tooth extraction, 1/2500 spontaneous) and atypical femur fractures (1/1000) with chronic bisphosphonates.   Next DEXA scan in 1 year, would also check bone turnover markers.        Test and/or medications prescribed today:  Orders Placed This Encounter   Procedures     DX Bone Density     25 Hydroxyvitamin D2 and D3     Albumin level     Calcium     Bone specific alk phosphatase     N telopeptide cross linked urine     Creatinine     C-Telopeptide, Beta-Cross-Linked         Follow up: 1 year  40 minutes spent on the date of the encounter doing chart review, history and exam, documentation and further activities as noted above.       The longitudinal plan of care for the diagnosis(es)/condition(s) as documented were addressed during this visit. Due to the added complexity in care, I will continue to support Mirta in the subsequent management and with ongoing continuity of care.    Abilio Trujillo MD  Endocrinology, Diabetes and Metabolism  [facility]      Again, thank you for allowing me  to participate in the care of your patient.        Sincerely,        Abilio Trujillo MD    Electronically signed

## 2025-07-09 NOTE — PROGRESS NOTES
Virtual Visit Details    Type of service:  Telephone Visit   Phone call duration: 19 minutes   Originating Location (pt. Location): Home    Distant Location (provider location):  Off-site  Telephone visit completed due to the patient did not have access to video, while the distant provider did.             NAME:  Mirta Faria  PCP:  Fernanda Haas  MRN:  [id number]  Reason for Consult:  osteoporosis   Requesting Provider:  Fernanda Haas    Chief Complaint     Chief Complaint   Patient presents with    RECHECK       History of Present Illness     Mirta Faria is a 73 year old female who is seen in clinic for osteoporosis follow-up, last seen 5/2024.     Her PMH is otherwise significant for 2 hip replacement due to OA. Her problem list report hypercalciuria; she had a 24 hour urine of calcium on 9/2020 was 269 mg. She was on chlorthalidone in the past.      She was first diagnosed with OP in 2016, per her notes she was started on prolia 1/2018 ( corrected from my previous note). Per chart review she was seen by Dr. Papi Bedolla 12/2016 with plan to start prolia. There is a note from yuri Martinez 12/23/2016 that she received prolia injection.She continued on prolia every 6 month, had prolia on 2/2023 and last one on 8/2023 those were done in [place]. She said most of her osteoporosis care was completed in [place]. She had IV reclast on 8/2021 ordered by Dr Papi Bedolla with plan to stop prolia. She continued to get prolia in [place] ( per her note as reported below she received a total of 7 shots). Last visit we discussed stopping prolia. She received IV reclast 5/2024.     In summary of OP treatment:  Prolia:  1/2018 7/2018 2/2020 8/2020 2/2021  Reclast 8/2021  Prolia   2/2023 8/2023  Reclast 5/2024      I reviewed her Dexa scan in records: she had one in 2007 but I am unable to see it.  DEXA scan in 2016 (only lumbar area is scanned), lowest T score of -2.6.  DEXA scan in 2018, lowest T score of -2.1 at  the lumbar spine, there was significant 6.7% increase in the bone density at the spine and 4.2% increase at the left total hip compared to 2016.  DEXA scan in November 2020 showed lowest T score of -1.7 at the lumbar spine compared to 2018 that has been 4.5% increase in the bone density at the spine and 8.9% increase at the left total hip.  Most recent DEXA scan on November 2022 showed a T score of -2 at the lumbar spine.  Radius was done for the first time which showed a T score of -3.1.  There has been 3.5% decrease in the lumbar spine. SHE IS S/P Rt and Lt hip replacement for OA, unable to complete dexa on those sites.    Most recent dexa scan 11/2024:  DXA RESULTS  -Lumbar Spine: L1-L4: BMD: 0.969 g/cm2. T-score: -1.8. Z-score: 0.0.  -RIGHT Radius 33%: BMD: 0.495 g/cm2. T-score: -3.0. Z-score: -0.8.    INTERVAL CHANGE  -There has been a 3.3% increase in lumbar spine BMD.   -There has been a 1.5% increase in the right radius 33% BMD.       Bone turnover markers:  April 2024 urine N-telopeptide 16.  May 2025 urine N-telopeptide 30  April 2024 C telopeptide 168, May 2025 C telopeptide 494    Calcium intake:   Dietary: 2 glasses milk daily, 1 serving of yogurt everyday, cheese frequently  Supplements: 600 mg 1 tab daily    Vitamin D intake:   Supplements: 80 mcg daily  Last vitamin D level was 75 on 4/25/24.    Osteoporosis Risk factors:   Previous fractures: none in adulthood. Arm fx at age of 14 while horseback riding.  Family history of fragility fracture in parent: no  Current smoking: no  Steroid Use: no  Rheumatoid  arthritis: no  Alcohol (3 or more units per day): no, every other day one glass of wine  Reported loss of height: no  Menstrual history: age at menopause: early 61  Estrogen use after menopause: no  Tendency for falls: no  GI history: Malabsorption (IBD, Celiac, gastric bypass ): no  History of kidney stones: no  History of thyroid disease: no  Physical activity: walking frequently  Weight history:  lost 12 lbs over the years.        Problem List     Patient Active Problem List   Diagnosis    Benign Polyps Of The Large Intestine    Hypercalciuria    Lichen Sclerosus Et Atrophicus    Lumbar herniated disc    Osteoporosis        Medications     Current Outpatient Medications   Medication Sig Dispense Refill    ascorbic acid, vitamin C, (VITAMIN C) 250 MG tablet [ASCORBIC ACID, VITAMIN C, (VITAMIN C) 250 MG TABLET] Take 250 mg by mouth daily.      CALCIUM CARBONATE (CALCIUM 500 ORAL) [CALCIUM CARBONATE (CALCIUM 500 ORAL)] Take by mouth.      clobetasol (TEMOVATE) 0.05 % external cream [CLOBETASOL (TEMOVATE) 0.05 % CREAM] APPLY SPARINGLY TO AFFECTED AREA(S) TWICE DAILY 15 g 1    estradiol (ESTRACE) 0.1 MG/GM vaginal cream [ESTRADIOL (ESTRACE) 0.01 % (0.1 MG/GRAM) VAGINAL CREAM] 1 gm vaginally 3 times a week 42.5 g 0     No current facility-administered medications for this visit.        Allergies     Allergies   Allergen Reactions    Tramadol Nausea       Medical / Surgical History     Past Medical History:   Diagnosis Date    Colon cancer (H) 2005    benign polyp removed    Urinary tract infection      Past Surgical History:   Procedure Laterality Date    ZZ EXCISION BRACH CLFT CYST,SUPERFICIAL      Description: Surgery Excis Of Branchial Cleft Cyst Confined Skin Subcut T;  Recorded: 08/18/2008;       Social History     Social History     Socioeconomic History    Marital status:      Spouse name: Not on file    Number of children: Not on file    Years of education: Not on file    Highest education level: Not on file   Occupational History    Not on file   Tobacco Use    Smoking status: Never    Smokeless tobacco: Never   Vaping Use    Vaping status: Never Used   Substance and Sexual Activity    Alcohol use: Not on file    Drug use: Not on file    Sexual activity: Not on file   Other Topics Concern    Not on file   Social History Narrative    Not on file     Social Drivers of Health     Financial Resource  Strain: Low Risk  (5/20/2025)    Financial Resource Strain     Within the past 12 months, have you or your family members you live with been unable to get utilities (heat, electricity) when it was really needed?: No   Food Insecurity: Low Risk  (5/20/2025)    Food Insecurity     Within the past 12 months, did you worry that your food would run out before you got money to buy more?: No     Within the past 12 months, did the food you bought just not last and you didn t have money to get more?: No   Transportation Needs: Low Risk  (5/20/2025)    Transportation Needs     Within the past 12 months, has lack of transportation kept you from medical appointments, getting your medicines, non-medical meetings or appointments, work, or from getting things that you need?: No   Physical Activity: Unknown (5/20/2025)    Exercise Vital Sign     Days of Exercise per Week: 5 days     Minutes of Exercise per Session: Not on file   Stress: No Stress Concern Present (5/20/2025)    [employer] [employer] - Occupational Stress Questionnaire     Feeling of Stress : Not at all   Social Connections: Unknown (5/20/2025)    Social Connection and Isolation Panel [NHANES]     Frequency of Communication with Friends and Family: Not on file     Frequency of Social Gatherings with Friends and Family: Twice a week     Attends Rastafarian Services: Not on file     Active Member of Clubs or Organizations: Not on file     Attends Club or Organization Meetings: Not on file     Marital Status: Not on file   Interpersonal Safety: Low Risk  (5/20/2025)    Interpersonal Safety     Do you feel physically and emotionally safe where you currently live?: Yes     Within the past 12 months, have you been hit, slapped, kicked or otherwise physically hurt by someone?: No     Within the past 12 months, have you been humiliated or emotionally abused in other ways by your partner or ex-partner?: No   Housing Stability: Low Risk  (5/20/2025)    Housing  "Stability     Do you have housing? : Yes     Are you worried about losing your housing?: No       Family History     Family History   Problem Relation Age of Onset    Alzheimer Disease Other     Osteoporosis Other        ROS     12 ROS completed, pertinent positive and negative in HPI    Physical Exam   LMP  (LMP Unknown)    GENERAL: Healthy, alert and no distress  EYES: Eyes grossly normal to inspection.  No discharge or erythema, or obvious scleral/conjunctival abnormalities.  RESP: No audible wheeze, cough, or visible cyanosis.  No visible retractions or increased work of breathing.    SKIN: Visible skin clear. No significant rash, abnormal pigmentation or lesions.  NEURO: Cranial nerves grossly intact.  Mentation and speech appropriate for age.  PSYCH: Mentation appears normal, affect normal/bright, judgement and insight intact, normal speech and appearance well-groomed.     Labs/Imaging     Pertinent Labs were reviewed and updated in EPIC and discussed briefly.  Radiology Results were  reviewed and updated in EPIC and discussed briefly.    Summary of recent findings:   No results found for: A1C    No results found for: \"TSH\", \"T4\"    Creatinine   Date Value Ref Range Status   05/20/2025 0.84 0.51 - 0.95 mg/dL Final       Recent Labs   Lab Test 12/27/17  0937   CHOL 225*   HDL 90      TRIG 46       No results found for: \"[id number]\", \"[id number]\", \"[id number]\"    I personally reviewed the patient's outside records from [facility] EMR and Care Everywhere. Summary of pertinent findings in HPI.    Impression / Plan     1.  Osteoporosis  Her risk factors for osteoporosis include age and postmenopausal.  She had a fragmented care between [place] and [place], affecting her scheduled on Prolia injections and osteoporosis care.  She received Prolia injection with interrupted therapy (unclear start date probably 2018), with a plan for it to be stopped in 2021, she received IV Reclast on August 2021 but continued with " Prolia injection in [place].  Last Prolia injection August 2023.  She had her second IV reclast on 5/2024. her dexa 11/2024 limited to the wrist and lumbar spine, overall stable BMD with lowest T-score of -3 at the wrist.  We discussed her osteoporosis medication options.  Her bone turnover markers from May 2025 are not suppressed, with trend up compared to a year ago.  We will plan on completing Reclast course of 3 years and getting her third dose of Reclast.  Discussed 1/3 risk of flu symptoms (acute phase reaction) after treatment with IV zoledronic acid. Discussed risks of osteonecrosis of the jaw (1/200 after tooth extraction, 1/2500 spontaneous) and atypical femur fractures (1/1000) with chronic bisphosphonates.   Next DEXA scan in 1 year, would also check bone turnover markers.        Test and/or medications prescribed today:  Orders Placed This Encounter   Procedures    DX Bone Density    25 Hydroxyvitamin D2 and D3    Albumin level    Calcium    Bone specific alk phosphatase    N telopeptide cross linked urine    Creatinine    C-Telopeptide, Beta-Cross-Linked         Follow up: 1 year  40 minutes spent on the date of the encounter doing chart review, history and exam, documentation and further activities as noted above.       The longitudinal plan of care for the diagnosis(es)/condition(s) as documented were addressed during this visit. Due to the added complexity in care, I will continue to support Mirta in the subsequent management and with ongoing continuity of care.    Abilio Trujillo MD  Endocrinology, Diabetes and Metabolism  [facility]

## 2025-07-09 NOTE — NURSING NOTE
Current patient location: home address in [place]     Is the patient currently in the state of [place]? YES    Visit mode: VIDEO    If the visit is dropped, the patient can be reconnected by:TELEPHONE VISIT: Phone number:   Telephone Information:   Mobile [phone]       Will anyone else be joining the visit? NO  (If patient encounters technical issues they should call [phone])    Are changes needed to the allergy or medication list? No    Are refills needed on medications prescribed by this physician? NO    Rooming Documentation:  Not applicable    Reason for visit: [facility]

## 2025-07-10 ENCOUNTER — TELEPHONE (OUTPATIENT)
Dept: ENDOCRINOLOGY | Facility: CLINIC | Age: 75
End: 2025-07-10
Payer: MEDICARE

## 2025-07-10 NOTE — TELEPHONE ENCOUNTER
Left Voicemail (1st Attempt) for the patient to call back and schedule the following:    Appointment type: return   Provider: dr. osorio  Return date: 7/9/2026  Specialty phone number: [phone]  Additional appointment(s) needed: dexa scan prior  Additonal Notes:  Return in about 1 year (around 7/9/2026).     [facility]   Orthopedics, Podiatry, Sports Medicine, Ent ,Eye , Audiology, Adult Endocrine & Diabetes, Nutrition & Medication Therapy Management Specialties   [facility]

## 2025-08-13 ENCOUNTER — HOSPITAL ENCOUNTER (OUTPATIENT)
Dept: BONE DENSITY | Facility: HOSPITAL | Age: 75
Discharge: HOME | End: 2025-08-13
Attending: STUDENT IN AN ORGANIZED HEALTH CARE EDUCATION/TRAINING PROGRAM
Payer: MEDICARE

## 2025-08-13 DIAGNOSIS — M81.0 AGE-RELATED OSTEOPOROSIS WITHOUT CURRENT PATHOLOGICAL FRACTURE: ICD-10-CM

## 2025-08-13 PROCEDURE — 77091 TBS TECHL CALCULATION ONLY: CPT

## 2025-08-14 ENCOUNTER — OFFICE VISIT (OUTPATIENT)
Dept: FAMILY MEDICINE | Facility: CLINIC | Age: 75
End: 2025-08-14
Payer: MEDICARE

## 2025-08-14 VITALS
TEMPERATURE: 98 F | RESPIRATION RATE: 18 BRPM | HEIGHT: 69 IN | BODY MASS INDEX: 17.42 KG/M2 | SYSTOLIC BLOOD PRESSURE: 134 MMHG | HEART RATE: 73 BPM | OXYGEN SATURATION: 98 % | WEIGHT: 117.6 LBS | DIASTOLIC BLOOD PRESSURE: 85 MMHG

## 2025-08-14 DIAGNOSIS — N30.01 ACUTE CYSTITIS WITH HEMATURIA: Primary | ICD-10-CM

## 2025-08-14 LAB
ALBUMIN UR-MCNC: NEGATIVE MG/DL
APPEARANCE UR: ABNORMAL
BACTERIA #/AREA URNS HPF: ABNORMAL /HPF
BILIRUB UR QL STRIP: NEGATIVE
COLOR UR AUTO: YELLOW
GLUCOSE UR STRIP-MCNC: NEGATIVE MG/DL
HGB UR QL STRIP: ABNORMAL
KETONES UR STRIP-MCNC: NEGATIVE MG/DL
LEUKOCYTE ESTERASE UR QL STRIP: ABNORMAL
NITRATE UR QL: NEGATIVE
PH UR STRIP: 6.5 [PH] (ref 5–8)
RBC #/AREA URNS AUTO: ABNORMAL /HPF
SP GR UR STRIP: 1.02 (ref 1–1.03)
SQUAMOUS #/AREA URNS AUTO: ABNORMAL /LPF
UROBILINOGEN UR STRIP-ACNC: 0.2 E.U./DL
WBC #/AREA URNS AUTO: ABNORMAL /HPF
WBC CLUMPS #/AREA URNS HPF: PRESENT /HPF

## 2025-08-14 RX ORDER — NITROFURANTOIN 25; 75 MG/1; MG/1
100 CAPSULE ORAL 2 TIMES DAILY
Qty: 14 CAPSULE | Refills: 0 | Status: SHIPPED | OUTPATIENT
Start: 2025-08-14 | End: 2025-08-21

## 2025-08-16 LAB — BACTERIA UR CULT: NORMAL

## 2025-08-26 ENCOUNTER — TELEPHONE (OUTPATIENT)
Dept: FAMILY MEDICINE | Facility: CLINIC | Age: 75
End: 2025-08-26
Payer: MEDICARE

## 2025-08-27 ENCOUNTER — VIRTUAL VISIT (OUTPATIENT)
Dept: FAMILY MEDICINE | Facility: CLINIC | Age: 75
End: 2025-08-27
Payer: MEDICARE

## 2025-08-27 ENCOUNTER — RESULTS FOLLOW-UP (OUTPATIENT)
Dept: FAMILY MEDICINE | Facility: CLINIC | Age: 75
End: 2025-08-27

## 2025-08-27 ENCOUNTER — LAB (OUTPATIENT)
Dept: LAB | Facility: CLINIC | Age: 75
End: 2025-08-27
Payer: MEDICARE

## 2025-08-27 ENCOUNTER — TELEPHONE (OUTPATIENT)
Dept: FAMILY MEDICINE | Facility: CLINIC | Age: 75
End: 2025-08-27

## 2025-08-27 DIAGNOSIS — N39.0 ACUTE UTI: Primary | ICD-10-CM

## 2025-08-27 DIAGNOSIS — R30.0 DYSURIA: ICD-10-CM

## 2025-08-27 DIAGNOSIS — L94.0 CIRCUMSCRIBED SCLERODERMA: ICD-10-CM

## 2025-08-27 DIAGNOSIS — R30.0 DYSURIA: Primary | ICD-10-CM

## 2025-08-27 LAB
ALBUMIN UR-MCNC: NEGATIVE MG/DL
APPEARANCE UR: CLEAR
BACTERIA #/AREA URNS HPF: ABNORMAL /HPF
BILIRUB UR QL STRIP: NEGATIVE
COLOR UR AUTO: YELLOW
GLUCOSE UR STRIP-MCNC: NEGATIVE MG/DL
HGB UR QL STRIP: ABNORMAL
KETONES UR STRIP-MCNC: NEGATIVE MG/DL
LEUKOCYTE ESTERASE UR QL STRIP: ABNORMAL
MUCOUS THREADS #/AREA URNS LPF: PRESENT /LPF
NITRATE UR QL: NEGATIVE
PH UR STRIP: 5.5 [PH] (ref 5–8)
RBC #/AREA URNS AUTO: ABNORMAL /HPF
SP GR UR STRIP: <=1.005 (ref 1–1.03)
SQUAMOUS #/AREA URNS AUTO: ABNORMAL /LPF
UROBILINOGEN UR STRIP-ACNC: 0.2 E.U./DL
WBC #/AREA URNS AUTO: >100 /HPF
WBC CLUMPS #/AREA URNS HPF: PRESENT /HPF

## 2025-08-27 PROCEDURE — 87186 SC STD MICRODIL/AGAR DIL: CPT

## 2025-08-27 PROCEDURE — 81001 URINALYSIS AUTO W/SCOPE: CPT

## 2025-08-27 PROCEDURE — 87086 URINE CULTURE/COLONY COUNT: CPT

## 2025-08-27 PROCEDURE — 98005 SYNCH AUDIO-VIDEO EST LOW 20: CPT | Performed by: FAMILY MEDICINE

## 2025-08-27 RX ORDER — SULFAMETHOXAZOLE AND TRIMETHOPRIM 800; 160 MG/1; MG/1
1 TABLET ORAL 2 TIMES DAILY
Qty: 6 TABLET | Refills: 1 | Status: SHIPPED | OUTPATIENT
Start: 2025-08-27 | End: 2025-08-30

## 2025-08-28 LAB — BACTERIA UR CULT: ABNORMAL
